# Patient Record
Sex: MALE | Race: BLACK OR AFRICAN AMERICAN | NOT HISPANIC OR LATINO | Employment: FULL TIME | URBAN - METROPOLITAN AREA
[De-identification: names, ages, dates, MRNs, and addresses within clinical notes are randomized per-mention and may not be internally consistent; named-entity substitution may affect disease eponyms.]

---

## 2017-04-12 ENCOUNTER — HOSPITAL ENCOUNTER (INPATIENT)
Facility: CLINIC | Age: 32
LOS: 2 days | Discharge: HOME | DRG: 897 | End: 2017-04-14
Attending: EMERGENCY MEDICINE | Admitting: PSYCHIATRY & NEUROLOGY
Payer: COMMERCIAL

## 2017-04-12 DIAGNOSIS — I21.4 NSTEMI (NON-ST ELEVATED MYOCARDIAL INFARCTION) (H): Primary | ICD-10-CM

## 2017-04-12 DIAGNOSIS — R45.851 SUICIDAL IDEATION: ICD-10-CM

## 2017-04-12 DIAGNOSIS — F19.10 SUBSTANCE ABUSE (H): ICD-10-CM

## 2017-04-12 DIAGNOSIS — F29 PSYCHOSIS, UNSPECIFIED PSYCHOSIS TYPE (H): ICD-10-CM

## 2017-04-12 PROBLEM — F14.90 COCAINE USE: Status: ACTIVE | Noted: 2017-04-12

## 2017-04-12 LAB
ALCOHOL BREATH TEST: 0 (ref 0–0.01)
AMPHETAMINES UR QL SCN: ABNORMAL
ANION GAP SERPL CALCULATED.3IONS-SCNC: 10 MMOL/L (ref 3–14)
BARBITURATES UR QL: ABNORMAL
BASOPHILS # BLD AUTO: 0 10E9/L (ref 0–0.2)
BASOPHILS NFR BLD AUTO: 0.3 %
BENZODIAZ UR QL: ABNORMAL
BUN SERPL-MCNC: 16 MG/DL (ref 7–30)
CALCIUM SERPL-MCNC: 8.3 MG/DL (ref 8.5–10.1)
CANNABINOIDS UR QL SCN: ABNORMAL
CHLORIDE SERPL-SCNC: 102 MMOL/L (ref 94–109)
CO2 SERPL-SCNC: 25 MMOL/L (ref 20–32)
COCAINE UR QL: ABNORMAL
CREAT SERPL-MCNC: 1.5 MG/DL (ref 0.66–1.25)
DIFFERENTIAL METHOD BLD: ABNORMAL
EOSINOPHIL # BLD AUTO: 0.1 10E9/L (ref 0–0.7)
EOSINOPHIL NFR BLD AUTO: 1.1 %
ERYTHROCYTE [DISTWIDTH] IN BLOOD BY AUTOMATED COUNT: 14.5 % (ref 10–15)
ETHANOL UR QL SCN: ABNORMAL
GFR SERPL CREATININE-BSD FRML MDRD: 54 ML/MIN/1.7M2
GLUCOSE SERPL-MCNC: 132 MG/DL (ref 70–99)
HCT VFR BLD AUTO: 39.3 % (ref 40–53)
HGB BLD-MCNC: 13.4 G/DL (ref 13.3–17.7)
IMM GRANULOCYTES # BLD: 0 10E9/L (ref 0–0.4)
IMM GRANULOCYTES NFR BLD: 0.2 %
INTERPRETATION ECG - MUSE: NORMAL
LYMPHOCYTES # BLD AUTO: 1.3 10E9/L (ref 0.8–5.3)
LYMPHOCYTES NFR BLD AUTO: 11.5 %
MCH RBC QN AUTO: 29.3 PG (ref 26.5–33)
MCHC RBC AUTO-ENTMCNC: 34.1 G/DL (ref 31.5–36.5)
MCV RBC AUTO: 86 FL (ref 78–100)
MONOCYTES # BLD AUTO: 0.8 10E9/L (ref 0–1.3)
MONOCYTES NFR BLD AUTO: 7.4 %
NEUTROPHILS # BLD AUTO: 9 10E9/L (ref 1.6–8.3)
NEUTROPHILS NFR BLD AUTO: 79.5 %
NRBC # BLD AUTO: 0 10*3/UL
NRBC BLD AUTO-RTO: 0 /100
OPIATES UR QL SCN: ABNORMAL
PLATELET # BLD AUTO: 395 10E9/L (ref 150–450)
POTASSIUM SERPL-SCNC: 3.4 MMOL/L (ref 3.4–5.3)
RBC # BLD AUTO: 4.58 10E12/L (ref 4.4–5.9)
SODIUM SERPL-SCNC: 138 MMOL/L (ref 133–144)
TROPONIN I SERPL-MCNC: 0.04 UG/L (ref 0–0.04)
TROPONIN I SERPL-MCNC: 0.05 UG/L (ref 0–0.04)
TROPONIN I SERPL-MCNC: 0.06 UG/L (ref 0–0.04)
WBC # BLD AUTO: 11.3 10E9/L (ref 4–11)

## 2017-04-12 PROCEDURE — 99285 EMERGENCY DEPT VISIT HI MDM: CPT | Performed by: EMERGENCY MEDICINE

## 2017-04-12 PROCEDURE — 25000132 ZZH RX MED GY IP 250 OP 250 PS 637: Performed by: EMERGENCY MEDICINE

## 2017-04-12 PROCEDURE — 80307 DRUG TEST PRSMV CHEM ANLYZR: CPT | Performed by: EMERGENCY MEDICINE

## 2017-04-12 PROCEDURE — 93005 ELECTROCARDIOGRAM TRACING: CPT | Performed by: EMERGENCY MEDICINE

## 2017-04-12 PROCEDURE — 85025 COMPLETE CBC W/AUTO DIFF WBC: CPT | Performed by: EMERGENCY MEDICINE

## 2017-04-12 PROCEDURE — 84484 ASSAY OF TROPONIN QUANT: CPT | Performed by: EMERGENCY MEDICINE

## 2017-04-12 PROCEDURE — 25000132 ZZH RX MED GY IP 250 OP 250 PS 637

## 2017-04-12 PROCEDURE — 12400001 ZZH R&B MH UMMC

## 2017-04-12 PROCEDURE — 36415 COLL VENOUS BLD VENIPUNCTURE: CPT | Performed by: EMERGENCY MEDICINE

## 2017-04-12 PROCEDURE — 99285 EMERGENCY DEPT VISIT HI MDM: CPT | Mod: 25 | Performed by: EMERGENCY MEDICINE

## 2017-04-12 PROCEDURE — 80320 DRUG SCREEN QUANTALCOHOLS: CPT | Performed by: EMERGENCY MEDICINE

## 2017-04-12 PROCEDURE — 93010 ELECTROCARDIOGRAM REPORT: CPT | Mod: Z6 | Performed by: EMERGENCY MEDICINE

## 2017-04-12 PROCEDURE — 80048 BASIC METABOLIC PNL TOTAL CA: CPT | Performed by: EMERGENCY MEDICINE

## 2017-04-12 PROCEDURE — 25000132 ZZH RX MED GY IP 250 OP 250 PS 637: Performed by: STUDENT IN AN ORGANIZED HEALTH CARE EDUCATION/TRAINING PROGRAM

## 2017-04-12 PROCEDURE — HZ2ZZZZ DETOXIFICATION SERVICES FOR SUBSTANCE ABUSE TREATMENT: ICD-10-PCS | Performed by: PSYCHIATRY & NEUROLOGY

## 2017-04-12 RX ORDER — ALBUTEROL SULFATE 90 UG/1
2 AEROSOL, METERED RESPIRATORY (INHALATION) EVERY 4 HOURS PRN
Status: DISCONTINUED | OUTPATIENT
Start: 2017-04-12 | End: 2017-04-14 | Stop reason: HOSPADM

## 2017-04-12 RX ORDER — FOLIC ACID 1 MG/1
1 TABLET ORAL DAILY
Status: DISCONTINUED | OUTPATIENT
Start: 2017-04-12 | End: 2017-04-14 | Stop reason: HOSPADM

## 2017-04-12 RX ORDER — OLANZAPINE 10 MG/1
10 TABLET ORAL EVERY 6 HOURS PRN
Status: DISCONTINUED | OUTPATIENT
Start: 2017-04-12 | End: 2017-04-14 | Stop reason: HOSPADM

## 2017-04-12 RX ORDER — ASPIRIN 81 MG/1
TABLET ORAL
Status: DISCONTINUED
Start: 2017-04-12 | End: 2017-04-12 | Stop reason: HOSPADM

## 2017-04-12 RX ORDER — OLANZAPINE 10 MG/2ML
10 INJECTION, POWDER, FOR SOLUTION INTRAMUSCULAR DAILY PRN
Status: DISCONTINUED | OUTPATIENT
Start: 2017-04-12 | End: 2017-04-14 | Stop reason: HOSPADM

## 2017-04-12 RX ORDER — LABETALOL 200 MG/1
200 TABLET, FILM COATED ORAL 2 TIMES DAILY
Status: ON HOLD | COMMUNITY
Start: 2016-12-14 | End: 2017-05-04

## 2017-04-12 RX ORDER — ALBUTEROL SULFATE 90 UG/1
2 AEROSOL, METERED RESPIRATORY (INHALATION) EVERY 4 HOURS PRN
Status: ON HOLD | COMMUNITY
End: 2017-05-04

## 2017-04-12 RX ORDER — RISPERIDONE 1 MG/1
1 TABLET ORAL AT BEDTIME
Status: DISCONTINUED | OUTPATIENT
Start: 2017-04-12 | End: 2017-04-14 | Stop reason: HOSPADM

## 2017-04-12 RX ORDER — MULTIPLE VITAMINS W/ MINERALS TAB 9MG-400MCG
1 TAB ORAL DAILY
Status: DISCONTINUED | OUTPATIENT
Start: 2017-04-12 | End: 2017-04-12 | Stop reason: CLARIF

## 2017-04-12 RX ORDER — LANOLIN ALCOHOL/MO/W.PET/CERES
100 CREAM (GRAM) TOPICAL DAILY
Status: COMPLETED | OUTPATIENT
Start: 2017-04-12 | End: 2017-04-14

## 2017-04-12 RX ORDER — MULTIPLE VITAMINS W/ MINERALS TAB 9MG-400MCG
1 TAB ORAL DAILY
Status: DISCONTINUED | OUTPATIENT
Start: 2017-04-12 | End: 2017-04-14 | Stop reason: HOSPADM

## 2017-04-12 RX ORDER — AMLODIPINE BESYLATE 2.5 MG/1
10 TABLET ORAL DAILY
Status: DISCONTINUED | OUTPATIENT
Start: 2017-04-12 | End: 2017-04-13

## 2017-04-12 RX ORDER — LORAZEPAM 1 MG/1
1-4 TABLET ORAL EVERY 30 MIN PRN
Status: DISCONTINUED | OUTPATIENT
Start: 2017-04-12 | End: 2017-04-14 | Stop reason: HOSPADM

## 2017-04-12 RX ORDER — ASPIRIN 81 MG/1
81 TABLET ORAL DAILY
Status: DISCONTINUED | OUTPATIENT
Start: 2017-04-12 | End: 2017-04-14 | Stop reason: HOSPADM

## 2017-04-12 RX ORDER — EPINEPHRINE 1 MG/ML
0.3 INJECTION INTRAMUSCULAR; INTRAVENOUS; SUBCUTANEOUS
Status: DISCONTINUED | OUTPATIENT
Start: 2017-04-12 | End: 2017-04-14 | Stop reason: HOSPADM

## 2017-04-12 RX ORDER — ASPIRIN 81 MG/1
324 TABLET, CHEWABLE ORAL ONCE
Status: COMPLETED | OUTPATIENT
Start: 2017-04-12 | End: 2017-04-12

## 2017-04-12 RX ORDER — FOLIC ACID 1 MG/1
1 TABLET ORAL DAILY
Status: DISCONTINUED | OUTPATIENT
Start: 2017-04-12 | End: 2017-04-12 | Stop reason: CLARIF

## 2017-04-12 RX ORDER — LISINOPRIL AND HYDROCHLOROTHIAZIDE 20; 25 MG/1; MG/1
30 TABLET ORAL DAILY
Status: ON HOLD | COMMUNITY
End: 2017-04-14

## 2017-04-12 RX ORDER — RISPERIDONE 1 MG/1
1 TABLET ORAL ONCE
Status: COMPLETED | OUTPATIENT
Start: 2017-04-12 | End: 2017-04-12

## 2017-04-12 RX ORDER — AMLODIPINE BESYLATE 10 MG/1
10 TABLET ORAL ONCE
Status: COMPLETED | OUTPATIENT
Start: 2017-04-12 | End: 2017-04-12

## 2017-04-12 RX ORDER — HYDROXYZINE HYDROCHLORIDE 25 MG/1
25-50 TABLET, FILM COATED ORAL EVERY 4 HOURS PRN
Status: DISCONTINUED | OUTPATIENT
Start: 2017-04-12 | End: 2017-04-14 | Stop reason: HOSPADM

## 2017-04-12 RX ORDER — LABETALOL 200 MG/1
200 TABLET, FILM COATED ORAL 2 TIMES DAILY
Status: DISCONTINUED | OUTPATIENT
Start: 2017-04-12 | End: 2017-04-14 | Stop reason: HOSPADM

## 2017-04-12 RX ORDER — OLANZAPINE 5 MG/1
10 TABLET, ORALLY DISINTEGRATING ORAL ONCE
Status: COMPLETED | OUTPATIENT
Start: 2017-04-12 | End: 2017-04-12

## 2017-04-12 RX ORDER — RISPERIDONE 1 MG/1
1 TABLET, ORALLY DISINTEGRATING ORAL ONCE
Status: DISCONTINUED | OUTPATIENT
Start: 2017-04-12 | End: 2017-04-12 | Stop reason: RX

## 2017-04-12 RX ORDER — LISINOPRIL 30 MG/1
30 TABLET ORAL
Status: ON HOLD | COMMUNITY
Start: 2016-12-14 | End: 2017-04-14

## 2017-04-12 RX ORDER — RISPERIDONE 0.25 MG/1
1 TABLET ORAL AT BEDTIME
COMMUNITY
End: 2017-04-17

## 2017-04-12 RX ORDER — TRAZODONE HYDROCHLORIDE 100 MG/1
100 TABLET ORAL
Status: ON HOLD | COMMUNITY
Start: 2016-12-14 | End: 2017-04-14

## 2017-04-12 RX ADMIN — Medication 100 MG: at 18:41

## 2017-04-12 RX ADMIN — LISINOPRIL 30 MG: 20 TABLET ORAL at 04:01

## 2017-04-12 RX ADMIN — AMLODIPINE BESYLATE 10 MG: 2.5 TABLET ORAL at 18:39

## 2017-04-12 RX ADMIN — OLANZAPINE 10 MG: 5 TABLET, ORALLY DISINTEGRATING ORAL at 04:01

## 2017-04-12 RX ADMIN — RANITIDINE HYDROCHLORIDE 150 MG: 150 TABLET, FILM COATED ORAL at 22:11

## 2017-04-12 RX ADMIN — ASPIRIN 324 MG: 81 TABLET, COATED ORAL at 04:03

## 2017-04-12 RX ADMIN — MULTIPLE VITAMINS W/ MINERALS TAB 1 TABLET: TAB at 18:41

## 2017-04-12 RX ADMIN — RISPERIDONE 1 MG: 1 TABLET ORAL at 04:01

## 2017-04-12 RX ADMIN — AMLODIPINE BESYLATE 10 MG: 10 TABLET ORAL at 04:01

## 2017-04-12 RX ADMIN — ASPIRIN 81 MG: 81 TABLET, COATED ORAL at 18:40

## 2017-04-12 RX ADMIN — RISPERIDONE 1 MG: 1 TABLET ORAL at 22:11

## 2017-04-12 RX ADMIN — LABETALOL HCL 200 MG: 200 TABLET, FILM COATED ORAL at 22:11

## 2017-04-12 RX ADMIN — VITAMIN D, TAB 1000IU (100/BT) 2000 UNITS: 25 TAB at 18:43

## 2017-04-12 RX ADMIN — FOLIC ACID 1 MG: 1 TABLET ORAL at 18:41

## 2017-04-12 ASSESSMENT — ENCOUNTER SYMPTOMS
NERVOUS/ANXIOUS: 1
HALLUCINATIONS: 1
AGITATION: 1
HYPERACTIVE: 1
SHORTNESS OF BREATH: 1
SLEEP DISTURBANCE: 1

## 2017-04-12 ASSESSMENT — ACTIVITIES OF DAILY LIVING (ADL)
ORAL_HYGIENE: INDEPENDENT
GROOMING: INDEPENDENT
DRESS: INDEPENDENT

## 2017-04-12 NOTE — ED NOTES
"Pt presents to ED with suicidal ideation s/t drug relapse. Pt states he had been sober for approx 6 months and has now been on a 4 day miranda of cocaine and meth. Pt also states he drinks a pint of vodka a day. Pt states he gets these thoughts everything he does a lot of drugs. Pt denies having a plan. Pt states \"everything is picture perfect its just the drugs do this to me\". Security watch intiated.   "

## 2017-04-12 NOTE — IP AVS SNAPSHOT
[place] [id number]    [place] [address]    Phone:  [phone]                                       After Visit Summary   4/12/2017    Bryon Alonzo    MRN: [id number]           After Visit Summary Signature Page     I have received my discharge instructions, and my questions have been answered. I have discussed any challenges I see with this plan with the nurse or doctor.    ..........................................................................................................................................  Patient/Patient Representative Signature      ..........................................................................................................................................  Patient Representative Print Name and Relationship to Patient    ..................................................               ................................................  Date                                            Time    ..........................................................................................................................................  Reviewed by Signature/Title    ...................................................              ..............................................  Date                                                            Time

## 2017-04-12 NOTE — IP AVS SNAPSHOT
MRN:[id number]                      After Visit Summary   4/12/2017    Bryon Alonzo    MRN: [id number]           Thank you!     Thank you for choosing [facility] for your care. Our goal is always to provide you with excellent care.        Patient Information     Date Of Birth          1985        Designated Caregiver       Most Recent Value    Caregiver    Will someone help with your care after discharge? no      About your hospital stay     You were admitted on:  April 12, 2017 You last received care in the:   [facility]    You were discharged on:  April 14, 2017       Who to Call     For medical emergencies, please call 911.  For non-urgent questions about your medical care, please call your primary care provider or clinic, [phone]          Attending Provider     Provider Specialty    Brandon Kumar MD Emergency Medicine    Roel Ortega MD Emergency Medicine    Fernando Crawford MD Psychiatry       Primary Care Provider Office Phone # Fax #    [facility] [phone]       [facility] [place] [address]        Further instructions from your care team       Behavioral Discharge Planning and Instructions      Summary:  You were admitted on 4/12/2017  For Bipolar D/O and Suicidal Ideations.  You were treated by Dr. Fernando Crawford MD and discharged on 4/14/2017 from [facility]. You have began to stabilize after restarting your medication and being cocaine free. Upon discharge you are to complete a Rule 25 assessment to secure placement in a treatment facility to help with your continued sobriety and stabilization.     Main Diagnosis: Bipolar Disorder    Health Care Follow-up Appointments:     Dr. Oshea- Monday April 17th 10:30am   [facility] [address], [address] [place] [address].   Phone: [phone] Fax: [phone]    Attend all scheduled appointments with your outpatient  providers. Call at least 24 hours in advance if you need to reschedule an appointment to ensure continued access to your outpatient providers.   Major Treatments, Procedures and Findings:  You were provided with: a psychiatric assessment, assessed for medical stability, medication evaluation and/or management and milieu management    Symptoms to Report: increased confusion, losing more sleep, mood getting worse or thoughts of suicide    Early warning signs can include: increased depression or anxiety sleep disturbances increased thoughts or behaviors of suicide or self-harm  increased unusual thinking, such as paranoia or hearing voices    Safety and Wellness:  Take all medicines as directed.  Make no changes unless your doctor suggests them.      Follow treatment recommendations.  Refrain from alcohol and non-prescribed drugs.  If there is a concern for safety, call 911.    Resources:   Crisis Intervention: 890.485.3119 or 430-891-2996 (TTY: 220.944.5170).  Call anytime for help.  National [place] on Mental Illness (www.[place].nba.org): 145.208.1447 or 000-910-8913.  Alcoholics Anonymous (www.alcoholics-anonymous.org): Check your phone book for your local chapter.  Suicide Awareness Voices of Education (SAVE) (www.save.org): 132-463-VWKX (7937)  National Suicide Prevention Line (www.mentalhealthmn.org): 159-927-CDWC (1343)  Mental Health Consumer/Survivor Network of [place] (www.mhcsn.net): 771.367.1361 or 184-269-7690  Self- Management and Recovery Training., SMART-- Toll free: 300.568.9018  www.[employer].OpSource  [facility] Crisis (COPE) Response - Adult [phone]  [facility] Mental Health Crisis Team - Child: [phone]    The treatment team has appreciated the opportunity to work with you.     If you have any questions or concerns our unit number is [phone]  You may be receiving a follow-up phone call within the next three days from a representative from behavioral health.   "          Pending Results     No orders found from 4/10/2017 to 4/13/2017.            Admission Information     Date & Time Provider Department Dept. Phone    4/12/2017 Fernando Crawford MD  [facility] [phone]      Your Vitals Were     Blood Pressure Pulse Temperature Respirations Height Pulse Oximetry    146/76 67 97.4  F (36.3  C) (Tympanic) 18 1.676 m (5' 6\") 97%      Care EveryWhere ID     This is your Care EveryWhere ID. This could be used by other organizations to access your [facility] medical records  [id number]           Review of your medicines      CONTINUE these medicines which may have CHANGED, or have new prescriptions. If we are uncertain of the size of tablets/capsules you have at home, strength may be listed as something that might have changed.        Dose / Directions    * albuterol 108 (90 BASE) MCG/ACT Inhaler   This may have changed:  Another medication with the same name was removed. Continue taking this medication, and follow the directions you see here.   Generic drug:  albuterol        Dose:  2 puff   Inhale 2 puffs into the lungs every 4 hours as needed   Refills:  0       * albuterol (2.5 MG/3ML) 0.083% neb solution   This may have changed:  Another medication with the same name was removed. Continue taking this medication, and follow the directions you see here.   Used for:  Mild intermittent asthma without complication        Dose:  1 vial   Take 1 vial (2.5 mg) by nebulization every 6 hours as needed for shortness of breath / dyspnea or wheezing   Quantity:  360 mL   Refills:  1       aspirin 81 MG EC tablet   This may have changed:  Another medication with the same name was removed. Continue taking this medication, and follow the directions you see here.   Used for:  NSTEMI (non-ST elevated myocardial infarction) (H)        Dose:  81 mg   Take 1 tablet (81 mg) by mouth daily   Quantity:  30 tablet   Refills:  1       labetalol 200 MG tablet   Commonly known as:  NORMODYNE   This may " have changed:  Another medication with the same name was removed. Continue taking this medication, and follow the directions you see here.        Dose:  200 mg   Take 200 mg by mouth   Refills:  0       ranitidine 150 MG tablet   Commonly known as:  ZANTAC   This may have changed:  Another medication with the same name was removed. Continue taking this medication, and follow the directions you see here.   Used for:  Gastroesophageal reflux disease without esophagitis        Dose:  150 mg   Take 1 tablet (150 mg) by mouth 2 times daily (before meals)   Quantity:  60 tablet   Refills:  1       risperiDONE 0.25 MG tablet   Commonly known as:  risperDAL   This may have changed:  Another medication with the same name was removed. Continue taking this medication, and follow the directions you see here.        Dose:  1 mg   Take 1 mg by mouth   Refills:  0       traZODone 50 MG tablet   Commonly known as:  DESYREL   This may have changed:  Another medication with the same name was removed. Continue taking this medication, and follow the directions you see here.   Used for:  Adjustment insomnia        Dose:  100 mg   Take 2 tablets (100 mg) by mouth At Bedtime   Quantity:  60 tablet   Refills:  1       * Notice:  This list has 2 medication(s) that are the same as other medications prescribed for you. Read the directions carefully, and ask your doctor or other care provider to review them with you.      CONTINUE these medicines which have NOT CHANGED        Dose / Directions    amLODIPine 10 MG tablet   Commonly known as:  NORVASC   Used for:  NSTEMI (non-ST elevated myocardial infarction) (H)        Dose:  10 mg   Take 1 tablet (10 mg) by mouth daily   Quantity:  30 tablet   Refills:  1       cholecalciferol 2000 UNITS tablet   Used for:  Polysubstance abuse        Dose:  2000 Units   Take 2,000 Units by mouth daily   Quantity:  30 tablet   Refills:  1       EPINEPHrine 0.3 MG/0.3ML injection   Used for:  Allergic reaction,  initial encounter        Dose:  0.3 mg   Inject 0.3 mLs (0.3 mg) into the muscle once as needed for anaphylaxis   Quantity:  0.6 mL   Refills:  1       folic acid 1 MG tablet   Commonly known as:  FOLVITE   Used for:  Polysubstance abuse        Dose:  1 mg   Take 1 tablet (1 mg) by mouth daily   Quantity:  30 tablet   Refills:  1       * lisinopril 2.5 MG tablet   Commonly known as:  PRINIVIL/Zestril   Used for:  NSTEMI (non-ST elevated myocardial infarction) (H)        Dose:  30 mg   Take 12 tablets (30 mg) by mouth daily   Quantity:  30 tablet   Refills:  1       * lisinopril 30 MG tablet   Commonly known as:  PRINIVIL,ZESTRIL        Dose:  30 mg   Take 30 mg by mouth   Refills:  0       multivitamin, therapeutic with minerals Tabs tablet   Used for:  Polysubstance abuse        Dose:  1 tablet   Take 1 tablet by mouth daily   Quantity:  30 each   Refills:  1       * Notice:  This list has 2 medication(s) that are the same as other medications prescribed for you. Read the directions carefully, and ask your doctor or other care provider to review them with you.      STOP taking     lisinopril-hydrochlorothiazide 20-25 MG per tablet   Commonly known as:  PRINZIDE/ZESTORETIC           loratadine 10 MG tablet   Commonly known as:  CLARITIN                    Protect others around you: Learn how to safely use, store and throw away your medicines at www.disposemymeds.org.             Medication List: This is a list of all your medications and when to take them. Check marks below indicate your daily home schedule. Keep this list as a reference.      Medications           Morning Afternoon Evening Bedtime As Needed    * albuterol 108 (90 BASE) MCG/ACT Inhaler   Inhale 2 puffs into the lungs every 4 hours as needed   Last time this was given:  2 puffs on 4/13/2017 10:32 PM   Generic drug:  albuterol                                * albuterol (2.5 MG/3ML) 0.083% neb solution   Take 1 vial (2.5 mg) by nebulization every 6  hours as needed for shortness of breath / dyspnea or wheezing                                amLODIPine 10 MG tablet   Commonly known as:  NORVASC   Take 1 tablet (10 mg) by mouth daily   Last time this was given:  10 mg on 4/13/2017 10:08 AM                                aspirin 81 MG EC tablet   Take 1 tablet (81 mg) by mouth daily   Last time this was given:  81 mg on 4/14/2017  8:56 AM                                cholecalciferol 2000 UNITS tablet   Take 2,000 Units by mouth daily   Last time this was given:  2,000 Units on 4/14/2017  8:57 AM                                EPINEPHrine 0.3 MG/0.3ML injection   Inject 0.3 mLs (0.3 mg) into the muscle once as needed for anaphylaxis                                folic acid 1 MG tablet   Commonly known as:  FOLVITE   Take 1 tablet (1 mg) by mouth daily   Last time this was given:  1 mg on 4/14/2017  8:57 AM                                labetalol 200 MG tablet   Commonly known as:  NORMODYNE   Take 200 mg by mouth   Last time this was given:  200 mg on 4/14/2017  8:57 AM                                * lisinopril 2.5 MG tablet   Commonly known as:  PRINIVIL/Zestril   Take 12 tablets (30 mg) by mouth daily   Last time this was given:  30 mg on 4/14/2017  8:57 AM                                * lisinopril 30 MG tablet   Commonly known as:  PRINIVIL,ZESTRIL   Take 30 mg by mouth   Last time this was given:  30 mg on 4/14/2017  8:57 AM                                multivitamin, therapeutic with minerals Tabs tablet   Take 1 tablet by mouth daily   Last time this was given:  1 tablet on 4/14/2017  8:57 AM                                ranitidine 150 MG tablet   Commonly known as:  ZANTAC   Take 1 tablet (150 mg) by mouth 2 times daily (before meals)   Last time this was given:  150 mg on 4/14/2017  8:57 AM                                risperiDONE 0.25 MG tablet   Commonly known as:  risperDAL   Take 1 mg by mouth   Last time this was given:  1 mg on 4/13/2017  10:32 PM                                traZODone 50 MG tablet   Commonly known as:  DESYREL   Take 2 tablets (100 mg) by mouth At Bedtime   Last time this was given:  25 mg on 4/13/2017  7:44 PM                                * Notice:  This list has 4 medication(s) that are the same as other medications prescribed for you. Read the directions carefully, and ask your doctor or other care provider to review them with you.

## 2017-04-12 NOTE — H&P
"    -----------------------------------------------------------------------------------------------------------  Psychiatry History & Physical      Bryon Alonzo MRN# [id number]   Age: 32 year old YOB: 1985     Date of Admission: 4/12/2017     Interviewed at 1:48 PM at [address] [facility]            Contacts:   The following information obtained from the note in last hospitalization on 7/26/16:   \"Primary Outpatient Psychiatrist: Dr. Oshea [facility]- [address] [place] [address], [address] [place] [address]. Phone: [phone] Fax: [phone]  Therapist: none  Primary Physician: Clinic, [facility] Dr. Fernanda Calhoun\"  [facility] CM: [facility] in [place] or [facility] in [place], previously [place] [facility]         Chief Concern:   \"I'm sleepy.\"    History is obtained from the patient and EMR.         History of Present Illness:   Bryon Alonzo is a 32 year old male with a significant past psychiatric history of  , bipolar disorder, polysubstance use disorder (Cocaine/Meth), who presented to [facility] ER with due to suicidality after relapsing on cocaine. At unit patient is sedated and is struggling to response. He is arousable, answers to questions in words, seems to be oriented and immediately falls sleep. Most of the information is obtained through chart review.     Per ED provider note at the day of admission: \"Bryon Alonzo is a 32 year old male with history of bipolar and polysubstance abuse who presents to ER for suicidal thoughts and substance abuse. Patient reports that he was sober for 6 months however relapsed about 4 days ago using cocaine and crack cocaine daily. He last used about 9 PM this evening. He reports feeling very anxious and out of control along with hearing voices, but have told them he is \"worthless\". Patient has also been off his regular blood pressure and antipsychotic medications for the last 4 days due to the drug use. He reports with the drug use he has been " "waxing and waning with thoughts about killing himself since he cannot stay sober. He states he gets worse when he comes off the cocaine and does not have a specific plan but expressed a desire to die. Patient reports that he feels very anxious and not safe at home and feels that he needs readmission to the hospital. He is concerned as he was previously on a commitment and is feeling very depressed about not being a little follow through with sobriety since coming off commitment. He expressed feelings of regret and the failure with regard to sobriety\".     His last [facility] was [facility] when he presented with the same clinical picture and was treated for substance use disorder and bipolar II DO. The petitions for commitment to [facility] was accepted on 9/12/16 for MICD. He was discharged to [facility] and also he was referred to the [facility]. He attended an orientation for day treatment on 12/26/16, but did not attended. Per notes he found a job and his schedule was interfering with the \"mixed co ocuring Day Outpatient\"program .     The patient was medically cleared and transferred to station 22.  Bryon Alonzo's goals of this hospitalization are to \"get some sleep\".          Psychiatric History:   Obtained from last hospitalization h& P note on 9/5/17:  \"- Inpatient treatment: >12 inpatient treatments, most recent [facility] in June. Has had multiple at [facility] and was committed while living in [place]  twice following a manic episode.  - Suicide attempts: This is 4th time for pt  - Self-injurious behavior: No hx  - Diagnoses: BPAD  - ECT: No hx  - Medication trials: several trials, prozac (dizziness), risperdal (sedation)-notes sedation is biggest factor to self discontinuation  - Commitment: As above, in [place] x2\"         Substance Use History:   Obtained from last hospitalization h&p note on 9/5/17:  \"- Started using at age " "10- THC and EtOH and then eventually heavy use of PCP followed by heavy use of LSD. Later started using cocaine and methamphetamine which are his current drugs of choice. No hx of IV drug use. Last binge  was prior to admission to Medicine.  - No hx of complicated withdrawal including DTs or seizures.  - Previous CD treatment: Several, most recent was [facility] in [place] in February  - Periods of abstinence: Had been sober for past 5 months until recent relapse\"         Psychiatric Review of Systems:     After receiving olanzapine in ED, patient  Has been sleepy. He is falling sleep frequently during our brief encounter, struggling to think clearly and answer the questions. MILLER is deferred to a later time when patient is wakeful.         Medical Review of Systems:   Per ED note:   \" Respiratory: Positive for shortness of breath (Shortness of breath and breathing fast tonight after using the cocaine). \"   MILLER is deferred to a later time when patient is wakeful.          Past Medical History     Past Medical History:   Diagnosis Date     Asthma      Bipolar disorder (H)      Chronic kidney disease      Hyperlipidemia      Hypertension      LVH (left ventricular hypertrophy) 2015     Polysubstance abuse     Methamphetamine, Cocaine, Alcohol, THC, LSD            Medications:   I have reviewed this patient's current medications. Patient is unable to recall the name of the medication. He only remembers the category of antihypertensives to mention. Chart review and pharmacy note was reviewed.          Allergies:     Allergies   Allergen Reactions     Banana Anaphylaxis           Family History:    Obtained from last hospitalization h& P note on 9/5/17:  \"- Substance use: Multigenerational use of alcohol and substances in the family.  - No completed suicides in relatives.\"        Social History   Obtained from last hospitalization h& P note on 9/5/17:  \"- Born in [facility] and raised in home by single mom.  - The patient is " "one of several siblings. One brother  from accident while high on ecstasy.  - Parents never .  - Financial situation: \"I'm not worried about it, because the [facility] will take care of it\".  - Social support: reports good friends who are sober are able to support him.  - Relationship: Single Has a 2 and 4 year old boys, (the kids share the same birthday) with one woman. \"I am not able to love her the way she deserves.\".  - Highest education attained: graduated HS.   - Employment: Unemployed, not seeking work.   - Legal history: \"No felonies\" On stay of Commitment. Pt requesting that this be revoked and he be committed to ensure adherence with CD treatment.  - Spiritual: \"I'm not Yarsani, but I am spiritual.\"         Labs:     Results for orders placed or performed during the hospital encounter of 17 (from the past 24 hour(s))   Drug abuse screen 6 urine (tox)   Result Value Ref Range    Amphetamine Qual Urine  NEG     Negative   Cutoff for a negative amphetamine is 500 ng/mL or less.      Barbiturates Qual Urine  NEG     Negative   Cutoff for a negative barbiturate is 200 ng/mL or less.      Benzodiazepine Qual Urine  NEG     Negative   Cutoff for a negative benzodiazepine is 200 ng/mL or less.      Cannabinoids Qual Urine (A) NEG     Positive   Cutoff for a positive cannabinoid is greater than 50 ng/mL. This is an   unconfirmed screening result to be used for medical purposes only.      Cocaine Qual Urine (A) NEG     Positive   Cutoff for a positive cocaine is greater than 300 ng/mL. This is an unconfirmed   screening result to be used for medical purposes only.      Ethanol Qual Urine  NEG     Negative   Cutoff for a negative urine ethanol is 0.05 g/dL or less      Opiates Qualitative Urine  NEG     Negative   Cutoff for a negative opiate is 300 ng/mL or less.     EKG 12 lead   Result Value Ref Range    Interpretation ECG Click View Image link to view waveform and result    Alcohol breath test POCT " "  Result Value Ref Range    Alcohol Breath Test 0.00 0.00 - 0.01   CBC with platelets differential   Result Value Ref Range    WBC 11.3 (H) 4.0 - 11.0 10e9/L    RBC Count 4.58 4.4 - 5.9 10e12/L    Hemoglobin 13.4 13.3 - 17.7 g/dL    Hematocrit 39.3 (L) 40.0 - 53.0 %    MCV 86 78 - 100 fl    MCH 29.3 26.5 - 33.0 pg    MCHC 34.1 31.5 - 36.5 g/dL    RDW 14.5 10.0 - 15.0 %    Platelet Count 395 150 - 450 10e9/L    Diff Method Automated Method     % Neutrophils 79.5 %    % Lymphocytes 11.5 %    % Monocytes 7.4 %    % Eosinophils 1.1 %    % Basophils 0.3 %    % Immature Granulocytes 0.2 %    Nucleated RBCs 0 0 /100    Absolute Neutrophil 9.0 (H) 1.6 - 8.3 10e9/L    Absolute Lymphocytes 1.3 0.8 - 5.3 10e9/L    Absolute Monocytes 0.8 0.0 - 1.3 10e9/L    Absolute Eosinophils 0.1 0.0 - 0.7 10e9/L    Absolute Basophils 0.0 0.0 - 0.2 10e9/L    Abs Immature Granulocytes 0.0 0 - 0.4 10e9/L    Absolute Nucleated RBC 0.0    Basic metabolic panel   Result Value Ref Range    Sodium 138 133 - 144 mmol/L    Potassium 3.4 3.4 - 5.3 mmol/L    Chloride 102 94 - 109 mmol/L    Carbon Dioxide 25 20 - 32 mmol/L    Anion Gap 10 3 - 14 mmol/L    Glucose 132 (H) 70 - 99 mg/dL    Urea Nitrogen 16 7 - 30 mg/dL    Creatinine 1.50 (H) 0.66 - 1.25 mg/dL    GFR Estimate 54 (L) >60 mL/min/1.7m2    GFR Estimate If Black 65 >60 mL/min/1.7m2    Calcium 8.3 (L) 8.5 - 10.1 mg/dL   Troponin I (now + 6 & 12 hrs)   Result Value Ref Range    Troponin I ES 0.057 (H) 0.000 - 0.045 ug/L   Troponin I   Result Value Ref Range    Troponin I ES 0.052 (H) 0.000 - 0.045 ug/L            Psychiatric Examination:   /82  Pulse 99  Temp 98.5  F (36.9  C) (Oral)  Resp 23  Ht 1.676 m (5' 6\")  SpO2 97%    Appearance:  young male, recorded age matches to the appearance, has a pair of bright earings, falling sleep while speaking  Attitude:  somewhat cooperative  Eye Contact:  eyes close mostly  Mood:  \"fine\"  Affect:  mood incongruent  Speech:  " mumbling  Psychomotor Behavior:  no evidence of tardive dyskinesia, dystonia, or tics  Thought Process:  linear  Associations:  no loose associations  Thought Content:  Unable to assess at  this time  Insight:  Unable to assess at  this time  Judgment:  Unable to assess at  this time  Oriented to:  Unable to assess at  this time  Attention Span and Concentration:  Unable to assess at  this time  Recent and Remote Memory:  Unable to assess at  this time  Language: communicates coherently in conversational context  Fund of Knowledge: Unable to assess at  this time  Muscle Strength and Tone: normal  Gait and Station: Unable to assess at  this time         Physical Examination:   Please refer to note by, , dated 4/12 for details of physical exam.         Assessment:   Bryon Alonzo is a 32 year old male with a history of poly substance use disorder (DOC: Cocaine) and bipolar disorder who presented to the ED due to suicidality, hallucinations and anxiety in the context of relapsing on cocaine use. Patient also reports significant alcohol use in the past week.  The patient's last psychiatric hospitalization was in [address] on 9/5/2016 when he came with similar clinical presentation, stabilized in the unit, medication restarted and discharged with recommendations for treatment compliance and abstinence from drugs and alcohol to [facility] facility.  I am uncertain if the patient is currently being seen by any provider and need to investigate this further when he is more wakeful. Family history is notable for substance use. Current psychosocial stressors are not well known other than the fact that he has a long standing psychiatric  History including bipolar and substance use. The patient admits to using cocaine and alcohol and was suicidal upon admision.      At ED he reports feeling very anxious and out of control along with hearing voices. The MSE is notable for a sedated, cooperative patient who   Just received olanzapine due to severity of his symptoms . His Utox was positive for cannabinoids and cocaine. Patient's current presentation is consistent with historic diagnosis of cannabinoids/ cocaine intoxication with perceptual disturbance because of 1) reported recent use of cocaine 2) sever anxiety and hypervigilance , and 3) SOB and psychomotor agitation, which is not best describable by another medical or mental health diagnosis. By reviewing the notes, I could not discern if ortega prompted him to use or vice versa. I recommend evaluating his behavior preceding his cocaine use to investigate the possibility of exacerbation of bipolar disorder. Per notes, he has been off his meds for the past 4 days. It was not clear to me why a patient with diagnosis of bipolar II is not on any mood stabilizer and instead is receiving Risperdal only. PTA medications are not confirmed by patient and they were restarted based on the information in the chart and the pharmacist. Given that he is actively suicidal, patient warrants inpatient psychiatric hospitalization to maintain his safety. Disposition pending clinical stabilization, medication optimization and development of an appropriate discharge plan. Suicide precaution and withdrawal precaution started for the patient. Considering his history of ETOH use disorder and the reported heavy alcohol use, MSSA with lorazepam on board as well as folic acid, multi vitamin and thiamin initiated for patient. Due to anaphylaxis to Banana, Epi pen provided.       Plan   Admit to station 22 under the care of Dr. Crawford. To be staffed by Dr. Crawford in the AM.    Principal Diagnosis:   # Cocaine/cannabinoids intoxication:  # Bipolar disorder    Medications:   New:   Zyprexa PO/IM for psychiatric emergencies  Hydroxyzine 25-50 mg PRN for anxiety    Continue/restart:  - Trazodone PRN for sleep  - Risperidone 1 mg QHS  - Folic Acid  - Multivitamin  - Thiamin    Other treatments:  - MSSA  Protocol with lorazepam    * Med list to be confirmed by patient.     Laboratory/Imaging: Admission labs ordered    Consults:   None    Patient will be treated in therapeutic milieu with appropriate individual and group therapies as described.    Secondary psychiatric diagnoses of concern this admission:     - Bipolar disorder type II, depressed state    Medical diagnoses to be addressed this admission:    Medications:     # h/o non-ischemic cardiomyopathy: Patient presented with SOB. Troponin I elevated , not changed from base line. EKG with non specific changes , not suggestive of STEMI.   - ASA tab 81 mg daily  - Recheck troponin I per ED provider note    # HTN/CKD:   - Amlodipine tab 10 mg  - Labetalol tab 200 mg BID  - lisinopril tab 30 mg daily    # asthma  - Albuterol inhaler     #GERD:  - Cont zantac     #hyperlipidemia: results suggestive of hyperlipidemia. Patient was once on Atorvastatin. Unclear why it was stopped.   - Lipid profile    Consults:  - IM conuslt placed, appreciate their recomms    Relevant psychosocial stressors: Not known    Legal Status: Voluntary    Safety Assessment:   Checks: Status 15  Precautions: Suicide  Substance Withdrawal  Pt has not required locked seclusion or restraints in the past 24 hours to maintain safety, please refer to RN documentation for further details.    The risks, benefits, alternatives and side effects have been discussed and are understood by the patient and other caregivers.     Anticipated Disposition/Discharge Date: Unknown at this time. Pending further clinical evaluation and stabilization.    Attestation:  Patient has been seen and evaluated by me,  Melissa Jimenez MD Psychiatry Resident, PGY-1.    Attending Admission Attestation Note:    I personally interviewed and examined Bryon on April 13, 2017, I reviewed the admission history/examination and other documents related to the admission.  I confirmed the findings in the admission note and I  agree with the diagnosis and treatment plan with the following corrections, clarifications, additional findings, and assessments: I certify that the treatment plan was reviewed and approved or developed by me in accordance with standard psychiatric practice. I certifiy that the inpatient services were ordered in accordance with the Medicare regulations governing the order. This includes certification that hospital inpatient services are reasonable and necessary and in the case of services not specified as inpatient-only under 42 .22(n), that they are appropriately provided as inpatient services in accordance with the 2-midnight benchmark under 42 .3(e).     The reason for inpatient status is Acute Psychosis, Suicidal Ideation and/or Behavior and Substance Dependence. High degree of complexity due to suicidality, delirium, psychosis, in association with cocaine use disorder.    Fernando Crawford M.D.  of Psychiatry  Pager: [phone]    email: [email or web address]

## 2017-04-12 NOTE — PROGRESS NOTES
04/12/17 1200   Patient Belongings   Did you bring any home meds/supplements to the hospital?  No   Patient Belongings keys;clothing;wallet;shoes   Disposition of Belongings All items locked in patient wall locker   Belongings Search Yes   Clothing Search Yes   Second Staff Sorin PINEDO   General Info Comment 50 dollars cash sent to [facility] with two visa cards.    ADMISSION:  I am responsible for any personal items that are not sent to the [facility] or pharmacy. [facility] is not responsible for loss, theft or damage of any property in my possession.    Patient Signature _____________________ Date/Time _____________________    Staff Signature _______________________ Date/Time _____________________    2nd Staff person, if patient is unable/unwilling to sign  ___________________________________ Date/Time _____________________  DISCHARGE:  All personal items have been returned to me.    Patient Signature _____________________ Date/Time _____________________    Staff Signature _______________________ Date/Time _____________________

## 2017-04-12 NOTE — ED PROVIDER NOTES
"  History     Chief Complaint   Patient presents with     Suicidal     Addiction Problem     HPI  Bryon Alonzo is a 32 year old male with history of bipolar and polysubstance abuse who presents to ER for suicidal thoughts and substance abuse.  Patient reports that he was sober for 6 months however relapsed about 4 days ago using cocaine and crack cocaine daily.  He last used about 9 PM this evening.  He reports feeling very anxious and out of control along with hearing voices, but have told them he is \"worthless\".  Patient has also been off his regular blood pressure and antipsychotic medications for the last 4 days due to the drug use.  He reports with the drug use he has been waxing and waning with thoughts about killing himself since he cannot stay sober.  He states he gets worse when he comes off the cocaine and does not have a specific plan but expressed a desire to die.  Patient reports that he feels very anxious and not safe at home and feels that he needs readmission to the hospital.  He is concerned as he was previously on a commitment and is feeling very depressed about not being a little follow through with sobriety since coming off commitment.  He expressed feelings of regret and the failure with regard to sobriety.    I have reviewed the Medications, Allergies, Past Medical and Surgical History, and Social History in the [facility] system.  Past Medical History:   Diagnosis Date     Asthma      Bipolar disorder (H)      Chronic kidney disease      Hyperlipidemia      Hypertension      LVH (left ventricular hypertrophy) 2015     Polysubstance abuse     Methamphetamine, Cocaine, Alcohol, THC, LSD       Past Surgical History:   Procedure Laterality Date     NO HISTORY OF SURGERY         Family History   Problem Relation Age of Onset     DIABETES Mother      Hypertension Mother      Coronary Artery Disease Father      KIDNEY DISEASE Father        Social History   Substance Use Topics     Smoking status: " "Current Some Day Smoker     Smokeless tobacco: Not on file     Alcohol use 0.0 oz/week     0 Standard drinks or equivalent per week      Comment: One pint per day of vodka per day for past five days        Allergies   Allergen Reactions     Banana Anaphylaxis     Current Facility-Administered Medications   Medication     aspirin EC 81 MG EC tablet     Current Outpatient Prescriptions   Medication     LABETALOL HCL PO     lisinopril (PRINIVIL,ZESTRIL) 2.5 MG tablet     amLODIPine (NORVASC) 10 MG tablet     traZODone (DESYREL) 50 MG tablet     folic acid (FOLVITE) 1 MG tablet     multivitamin, therapeutic with minerals (THERA-VIT-M) TABS     cholecalciferol 2000 UNITS tablet     aspirin 81 MG EC tablet     albuterol (2.5 MG/3ML) 0.083% nebulizer solution     albuterol (PROAIR HFA, PROVENTIL HFA, VENTOLIN HFA) 108 (90 BASE) MCG/ACT inhaler     loratadine (CLARITIN) 10 MG tablet     risperiDONE (RISPERDAL) 1 MG tablet     ranitidine (ZANTAC) 150 MG tablet     EPINEPHrine 0.3 MG/0.3ML injection 2-pack     Review of Systems   Respiratory: Positive for shortness of breath (Shortness of breath and breathing fast tonight after using the cocaine).    Psychiatric/Behavioral: Positive for agitation, hallucinations, sleep disturbance and suicidal ideas. Negative for self-injury. The patient is nervous/anxious and is hyperactive.    All other systems reviewed and are negative.      Physical Exam   BP: (!) 186/111  Pulse: 99  Heart Rate: 104  Temp: 98.5  F (36.9  C)  Resp: 16  Height: 167.6 cm (5' 6\")  SpO2: 99 %  Physical Exam   Constitutional: He appears well-developed and well-nourished. He appears distressed (Secondary to ortega and anxiety).   HENT:   Head: Normocephalic and atraumatic.   Eyes: Conjunctivae and EOM are normal. Pupils are equal, round, and reactive to light.   Neck: Normal range of motion.   Cardiovascular: Regular rhythm and intact distal pulses.  Tachycardia present.    Pulmonary/Chest: Effort normal and " breath sounds normal. No respiratory distress. He has no wheezes. He has no rales.   Abdominal: Soft. He exhibits no distension. There is no tenderness. There is no guarding.   Musculoskeletal: Normal range of motion. He exhibits no edema.   Neurological: He is alert. Coordination normal.   Skin: Skin is warm and dry. He is not diaphoretic. No pallor.   Psychiatric: His mood appears anxious. His speech is rapid and/or pressured and tangential. He is agitated, hyperactive and actively hallucinating. He is not aggressive and not combative. Thought content is not paranoid. He expresses impulsivity. He exhibits a depressed mood. He expresses suicidal ideation. He expresses no homicidal ideation. He expresses no suicidal plans.   Nursing note and vitals reviewed.    ED Course     ED Course     Procedures                EKG Interpretation:      Interpreted by Brandon Kumar  Time reviewed:0408   Symptoms at time of EKG: shortness of breath   Rhythm: normal sinus   Rate: normal  Axis: NORMAL  Ectopy: none  Conduction: normal  ST Segments/ T Waves: No ST-T wave changes  Q Waves: none  Comparison to prior: Unchanged from September 26, 2016.    Clinical Impression: normal EKG    Critical Care time:  none        Labs Ordered and Resulted from Time of ED Arrival Up to the Time of Departure from the ED   DRUG ABUSE SCREEN 6 CHEM DEP URINE ([facility]) - Abnormal; Notable for the following:        Result Value    Cannabinoids Qual Urine   (*)     Value: Positive   Cutoff for a positive cannabinoid is greater than 50 ng/mL. This is an   unconfirmed screening result to be used for medical purposes only.      Cocaine Qual Urine   (*)     Value: Positive   Cutoff for a positive cocaine is greater than 300 ng/mL. This is an unconfirmed   screening result to be used for medical purposes only.      All other components within normal limits   CBC WITH PLATELETS DIFFERENTIAL - Abnormal; Notable for the following:     WBC 11.3 (*)      Hematocrit 39.3 (*)     Absolute Neutrophil 9.0 (*)     All other components within normal limits   BASIC METABOLIC PANEL - Abnormal; Notable for the following:     Glucose 132 (*)     Creatinine 1.50 (*)     GFR Estimate 54 (*)     Calcium 8.3 (*)     All other components within normal limits   TROPONIN I - Abnormal; Notable for the following:     Troponin I ES 0.057 (*)     All other components within normal limits   ALCOHOL BREATH TEST POCT - Normal   CARDIAC CONTINUOUS MONITORING       Assessments & Plan (with Medical Decision Making)   I was physically present and have reviewed and verified the accuracy of this note documented by (myself).     Disclaimer: This note consists of symbols derived from keyboarding, dictation, and/or voice recognition software. As a result, there may be errors in the script that have gone undetected.  Please consider this when interpreting information found in the chart.These sections of the chart were reviewed for accuracy to the best of my knowledge and ability.    Patient was clinically assessed and consented to treatment. After assessment, medical decision making and workup were discussed with the patient. The patient was agreeable to plan for testing, workup, and treatment.  Bryon Alonzo is a 32 year old male who presents today for suicidal ideation, hallucinations and anxiety.  Patient also reporting some shortness breath and he has had elevated troponin with his history of cocaine use.  EKG was checked which was unremarkable and showed no acute changes.  Labs were checked which showed a slight leukocytosis 11.3 and no significant changes from his baseline metabolic panel.  Creatinine have been elevated for her was 1.5.  Troponin was 0.057 which is at baseline for the patient.  Patient did receive his oral blood pressure and antipsychotic medications along with a dose of Zyprexa as he continued to need to have racing thoughts, pressured speech, and anxiety despite his  Risperdal.  Patient was sleeping after the Zyprexa and had no further shortness of breath.  His vital signs did improve with the blood pressure medication and calming him down.  Patient hallucinating with manic type behavior but not acutely aggressive or violently psychotic at this time.  Patient was voluntary and plan for mental health admission.  Patient will receive repeat troponin at 8:45 AM and continue with plan for admission to mental health.  Report was given to behavioral intake and patient will be plan for admission to mental health when a bed is available.    I have reviewed the nursing notes.    I have reviewed the findings, diagnosis, plan and need for follow up with the patient.    New Prescriptions    No medications on file       Final diagnoses:   Suicidal ideation   Substance abuse   Psychosis, unspecified psychosis type       4/12/2017   [facility], [place], EMERGENCY DEPARTMENT     Brandon Kumar MD  04/12/17 0641

## 2017-04-13 LAB
CHOLEST SERPL-MCNC: 213 MG/DL
HDLC SERPL-MCNC: 75 MG/DL
HIV 1+2 AB+HIV1 P24 AG SERPL QL IA: NORMAL
LDLC SERPL CALC-MCNC: 102 MG/DL
NONHDLC SERPL-MCNC: 138 MG/DL
TRIGL SERPL-MCNC: 179 MG/DL

## 2017-04-13 PROCEDURE — 99207 ZZC CONSULT E&M CHANGED TO INITIAL LEVEL: CPT | Performed by: PHYSICIAN ASSISTANT

## 2017-04-13 PROCEDURE — 25000132 ZZH RX MED GY IP 250 OP 250 PS 637: Performed by: STUDENT IN AN ORGANIZED HEALTH CARE EDUCATION/TRAINING PROGRAM

## 2017-04-13 PROCEDURE — 86780 TREPONEMA PALLIDUM: CPT | Performed by: PHYSICIAN ASSISTANT

## 2017-04-13 PROCEDURE — 99222 1ST HOSP IP/OBS MODERATE 55: CPT | Performed by: PHYSICIAN ASSISTANT

## 2017-04-13 PROCEDURE — 87389 HIV-1 AG W/HIV-1&-2 AB AG IA: CPT | Performed by: PHYSICIAN ASSISTANT

## 2017-04-13 PROCEDURE — 80061 LIPID PANEL: CPT | Performed by: STUDENT IN AN ORGANIZED HEALTH CARE EDUCATION/TRAINING PROGRAM

## 2017-04-13 PROCEDURE — 12400001 ZZH R&B MH UMMC

## 2017-04-13 PROCEDURE — 36415 COLL VENOUS BLD VENIPUNCTURE: CPT | Performed by: PHYSICIAN ASSISTANT

## 2017-04-13 PROCEDURE — 99223 1ST HOSP IP/OBS HIGH 75: CPT | Mod: GC | Performed by: PSYCHIATRY & NEUROLOGY

## 2017-04-13 PROCEDURE — 36415 COLL VENOUS BLD VENIPUNCTURE: CPT | Performed by: STUDENT IN AN ORGANIZED HEALTH CARE EDUCATION/TRAINING PROGRAM

## 2017-04-13 PROCEDURE — 86803 HEPATITIS C AB TEST: CPT | Performed by: PHYSICIAN ASSISTANT

## 2017-04-13 RX ADMIN — AMLODIPINE BESYLATE 10 MG: 2.5 TABLET ORAL at 10:08

## 2017-04-13 RX ADMIN — LISINOPRIL 30 MG: 20 TABLET ORAL at 10:07

## 2017-04-13 RX ADMIN — Medication 100 MG: at 10:06

## 2017-04-13 RX ADMIN — Medication 25 MG: at 19:44

## 2017-04-13 RX ADMIN — ASPIRIN 81 MG: 81 TABLET, COATED ORAL at 10:06

## 2017-04-13 RX ADMIN — LABETALOL HCL 200 MG: 200 TABLET, FILM COATED ORAL at 19:44

## 2017-04-13 RX ADMIN — RISPERIDONE 1 MG: 1 TABLET ORAL at 22:32

## 2017-04-13 RX ADMIN — RANITIDINE HYDROCHLORIDE 150 MG: 150 TABLET, FILM COATED ORAL at 19:44

## 2017-04-13 RX ADMIN — VITAMIN D, TAB 1000IU (100/BT) 2000 UNITS: 25 TAB at 10:06

## 2017-04-13 RX ADMIN — MULTIPLE VITAMINS W/ MINERALS TAB 1 TABLET: TAB at 10:05

## 2017-04-13 RX ADMIN — LABETALOL HCL 200 MG: 200 TABLET, FILM COATED ORAL at 10:08

## 2017-04-13 RX ADMIN — FOLIC ACID 1 MG: 1 TABLET ORAL at 10:06

## 2017-04-13 RX ADMIN — RANITIDINE HYDROCHLORIDE 150 MG: 150 TABLET, FILM COATED ORAL at 10:06

## 2017-04-13 RX ADMIN — ALBUTEROL SULFATE 2 PUFF: 90 AEROSOL, METERED RESPIRATORY (INHALATION) at 22:32

## 2017-04-13 ASSESSMENT — ACTIVITIES OF DAILY LIVING (ADL)
DRESS: 0-->INDEPENDENT
SWALLOWING: 0-->SWALLOWS FOODS/LIQUIDS WITHOUT DIFFICULTY
COGNITION: 0 - NO COGNITION ISSUES REPORTED
TRANSFERRING: 0-->INDEPENDENT
GROOMING: INDEPENDENT
TOILETING: 0-->INDEPENDENT
RETIRED_COMMUNICATION: 0-->UNDERSTANDS/COMMUNICATES WITHOUT DIFFICULTY
RETIRED_EATING: 0-->INDEPENDENT
FALL_HISTORY_WITHIN_LAST_SIX_MONTHS: NO
AMBULATION: 0-->INDEPENDENT
BATHING: 0-->INDEPENDENT

## 2017-04-13 NOTE — PROGRESS NOTES
----------------------------------------------------------------------------------------------------------  [facility], [place]   Psychiatric Progress Note     Assessment    Presentation: Bryon Alonzo is a 32 year old male with a significant past psychiatric history of , bipolar disorder, polysubstance use disorder (Cocaine/Meth), who presented to [facility] ER with due to suicidality after relapsing on cocaine    Diagnostic Impression: At ED he reports feeling very anxious and out of control along with hearing voices. The MSE is notable for a sedated, cooperative patient who Just received olanzapine due to severity of his symptoms . His Utox was positive for cannabinoids and cocaine. Patient's current presentation is consistent with historic diagnosis of cannabinoids/ cocaine intoxication with perceptual disturbance because of 1) reported recent use of cocaine 2) sever anxiety and hypervigilance , and 3) SOB and psychomotor agitation, which is not best describable by another medical or mental health diagnosis. Cocaine use, has consumed his life and he has been in and out of hospital for cocaine use, for mental and medical crisis only in the past year. Severe cocaine use disorder is the primary diagnosis of concern at this hospitalization.     Hospital course: Bryon Alonzo was admitted to station 22  as a voluntary patient and PTA medications started. Admission labs were sent and suicidal/withdrawal precautions ordered. Patient was placed on MSSA given the hx of ETOH use do and recent heavy use. Patient slept for the most part of the first day of admission. He met the team next morning and he expressed his willing to stay sober and clean therefore requested commitment and CD treatment route.     Medical course : Patient has a complicated medical history and IM consult was placed.     Plan     Principal Diagnosis:   # Cocaine/cannabinoids intoxication:  # Bipolar  disorder     Medications:   New:   None     Continue/restart:  - Trazodone PRN for sleep  - Risperidone 1 mg QHS  - Folic Acid  - Multivitamin  - Thiamin  - Zyprexa PO/IM for psychiatric emergencies  - Hydroxyzine 25-50 mg PRN for anxiety     Other treatments:  - MSSA Protocol with lorazepam     * Med list to be confirmed by patient.      Laboratory/Imaging: Admission labs ordered     Consults:   None     Patient will be treated in therapeutic milieu with appropriate individual and group therapies as described.     Secondary psychiatric diagnoses of concern this admission:      - Bipolar disorder type II, depressed state: The diagnosis is self reported and not confirmed by team yet.      Medical diagnoses to be addressed this admission:    Medications:      # h/o non-ischemic cardiomyopathy: Patient presented with SOB. Troponin I elevated , not changed from base line. EKG with non specific changes , not suggestive of STEMI.   - ASA tab 81 mg daily  - Recheck troponin I per ED provider note     # HTN/CKD:   - Amlodipine tab 10 mg  - Labetalol tab 200 mg BID  - lisinopril tab 30 mg daily     # asthma  - Albuterol inhaler      #GERD:  - Cont zantac      #hyperlipidemia: results suggestive of hyperlipidemia. Patient was once on Atorvastatin. Unclear why it was stopped.   - Lipid profile     Consults:  - IM conuslt placed, appreciate their recomms     Relevant psychosocial stressors: Not known     Legal Status: Voluntary     Safety Assessment:   Checks: Status 15  Precautions: Suicide  Substance Withdrawal  Pt has not required locked seclusion or restraints in the past 24 hours to maintain safety, please refer to RN documentation for further details.    The risks, benefits, alternatives and side effects have been discussed and are understood by the patient and other caregivers.     Anticipated Disposition/Discharge Date: Unknown at this time. Pending further clinical evaluation and stabilization.     Pt seen and  "discussed with my attending, Dr. Crawford.   Melissa Jimenez MD  PGY1 Psychiatry Resident  Pager: [phone]    Psychiatry Attending Attestation:  This patient has been seen and evaluated by me, Fernando Crawford M.D.  The patient's condition and treatment plan were discussed with the resident, and care coordinated with the CTC and RN. I reviewed, edited and agree with the findings and plan in this note.    Patient known to me from past admissions. Very refractory cocaine use, many admissions last year, was clean for past 6 months, but then cashed in some \"assets\", which I believe are royalties his from [employer]. Spent about $5K on crack and powder in a week. Doesn't know shy he relapsed now. Not attending any meetings after commitment . Stopped risperidone about that time. Past Dx of bipolar disorder has been questioned by recent psychiatrists, including Dr. Ferreira and me. Unclear if he needs risperidone long-term but it will help resolve his psychosis now.     Fernando Crawford M.D.   of Psychiatry   Interim History:   The patient's care was discussed with the treatment team and chart notes were reviewed.    Sleep: 7: Hypertensive otherwise normal and stable:note:reported no sleep for 4 days, sleeping soundly, decline some of cares to sleep.     Patient slept for the most part of the first day of admission. He met the team today at conference room. He reported that he \"did it again\" and is \"feeling guilty\". He acknowledged that cocaine use is harming him medically, mentally and socially and  he expressed his willing to stay sober and clean therefore requested commitment and CD treatment route.  Team reviewed his history of hospitalization and discussed that [facility] might not support his request for funding in the grounds of his non adherence to treatment plan. As an example, team discussed his non attendance at day treatment. Patient reported that he was working in " "that time and the fact that when he is sober, \"my brain works and I make a lot of money\". 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 He reports no previous intention for selling these albums and no plan to spend this money. That's why he ended up spending $5k worth of cocaine on the day that his commitment ended and smoked/snorted it in a course of 4 days. He states that the only thing that worked for him was commitment+ locked CD facility and he is requesting for it.     He denies any manic episode and reports that Dr. Ferreira was not convinced that he is suffering from mood disorder. It seems like his suicidality is an impulsive act following cocaine intoxication. Of note, patient has over 9 hospitalization related to cocaine use from January to September 2016 and the prolonged period of sobriety was following the discharge from  treatment program.     Patient will be presented in the professor's rounds for exploring the diagnosis and possible treatment plans.     Review of systems:     The Review of Systems is negative other than noted above.          Medications:     Current Facility-Administered Medications   Medication     albuterol (PROAIR HFA/PROVENTIL HFA/VENTOLIN HFA) Inhaler 2 puff     aspirin EC EC tablet 81 mg     cholecalciferol (vitamin D) tablet 2,000 Units     EPINEPHrine (ADRENALIN) injection 0.3 mg     folic acid (FOLVITE) tablet 1 mg     labetalol (NORMODYNE) tablet 200 mg     lisinopril (PRINIVIL/ZESTRIL) tablet 30 mg     multivitamin, therapeutic with minerals (THERA-VIT-M) tablet 1 tablet     ranitidine (ZANTAC) tablet 150 mg     risperiDONE (risperDAL) tablet 1 mg     OLANZapine (zyPREXA) injection 10 mg    Or     OLANZapine (zyPREXA) tablet 10 mg     hydrOXYzine (ATARAX) tablet 25-50 mg     LORazepam (ATIVAN) tablet 1-4 mg     thiamine tablet 100 mg     " "nicotine polacrilex (NICORETTE) gum 2 mg     traZODone (DESYREL) half-tab 25 mg             Allergies:     Allergies   Allergen Reactions     Banana Anaphylaxis            Psychiatric Examination:   /64  Pulse 66  Temp 98.7  F (37.1  C) (Tympanic)  Resp 16  Ht 1.676 m (5' 6\")  SpO2 97%  Weight is 0 lbs 0 oz  There is no height or weight on file to calculate BMI.    Appearance:  awake, alert, appeared as age stated and neatly groomed  Attitude:  cooperative  Eye Contact:  fair  Mood:  okay  Affect:  mood congruent, intensity is normal, constricted mobility and full range  Speech:  clear, coherent  Psychomotor Behavior:  no evidence of tardive dyskinesia, dystonia, or tics  Thought Process:  logical and goal oriented  Associations:  no loose associations  Thought Content:  no evidence of suicidal ideation or homicidal ideation  Insight:  fair  Judgment:  fair  Oriented to:  time, person, and place  Attention Span and Concentration:  intact  Recent and Remote Memory:  intact  Language: Able to name objects, Able to repeat phrases and Able to read and write   Fund of Knowledge: appropriate  Muscle Strength and Tone: normal  Gait and Station: Normal         Labs:     Recent Results (from the past 24 hour(s))   Lipid panel    Collection Time: 04/13/17  7:23 AM   Result Value Ref Range    Cholesterol 213 (H) <200 mg/dL    Triglycerides 179 (H) <150 mg/dL    HDL Cholesterol 75 >39 mg/dL    LDL Cholesterol Calculated 102 (H) <100 mg/dL    Non HDL Cholesterol 138 (H) <130 mg/dL   HIV Antigen Antibody Combo    Collection Time: 04/13/17 12:14 PM   Result Value Ref Range    HIV Antigen Antibody Combo  NR     Nonreactive   HIV-1 p24 Ag & HIV-1/HIV-2 Ab Not Detected         Antipsychotic Labs:  Recent Labs   Lab Test  04/13/17   0723  08/19/16   0718  05/25/16   0733   CHOL  213*  274*  177   TRIG  179*  181*  151*   LDL  102*  183*  98   HDL  75  55  49     Recent Labs   Lab Test  04/12/17   0414  09/21/16   0711  " 09/05/16   0926   GLC  132*  80  109*     Recent Labs   Lab Test  04/12/17   0414  09/05/16   0926  07/24/16   0532  07/23/16   1059   WBC  11.3*  8.8  4.5  4.4   ANEU  9.0*  5.9   --   2.5   HGB  13.4  12.9*  12.3*  12.7*   PLT  395  329  305  294

## 2017-04-13 NOTE — PROGRESS NOTES
Client refused 4 pm vital signs. MSSA score incomplete. He was sleeping soundly, snoring. He sat up to eat dinner in his room. Requested ginger ale after eating. He took vitamins and BP medication. This nurse encouraged him to allow vital signs next time so that we can make sure he is stable. He verbally agreed.

## 2017-04-13 NOTE — PROGRESS NOTES
Pt was isolative to room for shift sleeping. Pt ate meal in room. Pt checked in with writer briefly, but decided to go back to bed alf through check in. Pt stated they were doing well, until they relapsed about a week ago. Pt stated that they know they are in the right place right now and want to continue being sober. Pt states they are tired due to not sleeping for several days. Pt states they are still having some effects from drugs and thought are progressively getting clearer. Pt denies all other mental health symptoms.      04/12/17 2000   Behavioral Health   Hallucinations denies / not responding to hallucinations   Thinking distractable   Orientation person: oriented;place: oriented;date: oriented;time: oriented   Memory baseline memory   Insight insight appropriate to situation   Judgement intact   Eye Contact at examiner   Affect blunted, flat   Mood mood is calm   Physical Appearance/Attire attire appropriate to age and situation   Hygiene well groomed   Suicidality other (see comments)  (denies)   Self Injury other (see comment)  (denies)   Activity isolative   Speech clear;coherent   Medication Sensitivity no observed side effects;no stated side effects   Psychomotor / Gait balanced;steady   Psycho Education   Type of Intervention 1:1 intervention   Response participates with encouragement   Hours 0.5   Treatment Detail check in    Group Therapy Session   Group Attendance attended group session   Time Session Began 1700   Time Session Ended 1730   Total Time (minutes) 30   Group Type community   Patient Participation/Contribution cooperative with task   Activities of Daily Living   Hygiene/Grooming independent   Oral Hygiene independent   Dress independent   Room Organization independent   Activity   Activity Level of Assistance independent

## 2017-04-13 NOTE — PLAN OF CARE
Problem: General Plan of Care (Inpatient Behavioral)  Goal: Team Discussion  Team Plan:   BEHAVIORAL TEAM DISCUSSION     Continued Stay Criteria/Rationale: New admission admitted due to psychosis and suicidal ideation   Plan: Restart medication to help with thoughts and secure a CD assessment to get patient into treatment.   Participants: Zuleika Gaines [facility],   Namrata Sharpe RN,  Dr. Fernando Crawford MD,  Dr. Melissa Jimenez MD, Suzanna Carreon MS3  Summary/Recommendation: Patient stated he relapsed after getting a large sum of money. Patient discussed thoughts of not having bipolar as he is stable in his thinking when he is sober.   Progress: Initiated

## 2017-04-13 NOTE — CONSULTS
Internal Medicine Initial Visit    Bryon Alonzo MRN# [id number]   Age: 32 year old YOB: 1985   Date of Admission: 4/12/2017    ADMIT DATE: 4/12/2017  DATE OF CONSULT: 4/13/2017    PCP: Pantera Oshea    REQUESTING SERVICE: Psychiatry  REASON FOR CONSULT: Elevated troponin, HTN, GERD    CHIEF COMPLAINT: Cocaine use    HPI: Bryon Alonzo is a 32 year old male with a past medical history of morbid obesity, atypical chest pain, HTN, GERD, mild persistent asthma, bipolar disorder and polysubtance abuse who is admitted [facility] for suicidal ideation, cocaine abuse x4 days.     The patient notes that he got a large sum of money all at once and decided to go on a cocaine binge for 4 days prior to admission. He notes that he was sober for 6 months. He smokes or snorts cocaine. No IVDU. Notes risky sexual practices but denies current STI symptoms.    He notes that his BP has been well controlled. Asthma is well controlled. GERD is well controlled.    He has no medical complaints.      ROS:   10 point ROS asked and otherwise negative, with exceptions as noted above in HPI.     PMH:  Past Medical History:   Diagnosis Date     Asthma      Bipolar disorder (H)      Chronic kidney disease      Hyperlipidemia      Hypertension      LVH (left ventricular hypertrophy) 2015     Polysubstance abuse     Methamphetamine, Cocaine, Alcohol, THC, LSD       PSH:  Past Surgical History:   Procedure Laterality Date     NO HISTORY OF SURGERY         MEDICATIONS    No current facility-administered medications on file prior to encounter.   Current Outpatient Prescriptions on File Prior to Encounter:  LABETALOL HCL PO Take by mouth 2 times daily   lisinopril (PRINIVIL,ZESTRIL) 2.5 MG tablet Take 12 tablets (30 mg) by mouth daily   amLODIPine (NORVASC) 10 MG tablet Take 1 tablet (10 mg) by mouth daily   traZODone (DESYREL) 50 MG tablet Take 2 tablets (100 mg) by mouth At Bedtime   folic acid (FOLVITE) 1 MG tablet Take 1  "tablet (1 mg) by mouth daily   multivitamin, therapeutic with minerals (THERA-VIT-M) TABS Take 1 tablet by mouth daily   cholecalciferol 2000 UNITS tablet Take 2,000 Units by mouth daily   aspirin 81 MG EC tablet Take 1 tablet (81 mg) by mouth daily   albuterol (2.5 MG/3ML) 0.083% nebulizer solution Take 1 vial (2.5 mg) by nebulization every 6 hours as needed for shortness of breath / dyspnea or wheezing   albuterol (PROAIR HFA, PROVENTIL HFA, VENTOLIN HFA) 108 (90 BASE) MCG/ACT inhaler Inhale 2 puffs into the lungs every 6 hours as needed for shortness of breath / dyspnea   loratadine (CLARITIN) 10 MG tablet Take 1 tablet (10 mg) by mouth daily as needed for allergies   risperiDONE (RISPERDAL) 1 MG tablet Take 1 tablet (1 mg) by mouth At Bedtime   ranitidine (ZANTAC) 150 MG tablet Take 1 tablet (150 mg) by mouth 2 times daily (before meals)   EPINEPHrine 0.3 MG/0.3ML injection 2-pack Inject 0.3 mLs (0.3 mg) into the muscle once as needed for anaphylaxis       ALLERGIES:     Allergies   Allergen Reactions     Banana Anaphylaxis       FAMILY HISTORY:  Family History   Problem Relation Age of Onset     DIABETES Mother      Hypertension Mother      Coronary Artery Disease Father      KIDNEY DISEASE Father        SOCIAL HISTORY:  Social History     Social History     Marital status: Single     Spouse name: N/A     Number of children: N/A     Years of education: N/A     Social History Main Topics     Smoking status: Current Some Day Smoker     Smokeless tobacco: None     Alcohol use 0.0 oz/week     0 Standard drinks or equivalent per week      Comment: One pint per day of vodka per day for past five days     Drug use: Yes     Special: Methamphetamines, Cocaine      Comment: \"i've been on a 4 day miranda\"     Sexual activity: Yes     Partners: Female     Other Topics Concern     None     Social History Narrative    Intake 9/6/16        Social History:    - Born in [facility] and raised in home by single mom.    - The " "patient is one of several siblings. One brother  from accident while high on ecstasy.    - Parents never .    - Financial situation: on [facility] assistance    - Social support: reports good friends who are sober are able to support him.    - Relationship: Single Has a 2 and 4 year old boys, (the kids share the same birthday) with one woman.    - Highest education attained: graduated HS.      - Employment: Unemployed, not seeking work.          Substance use History:    - Started using at age 10- THC and EtOH and then eventually heavy use of PCP followed by heavy use of LSD. Later started using cocaine and methamphetamine which are his current drugs of choice. No hx of IV drug use. Last binge 2016    - No hx of complicated withdrawal including DTs or seizures.    - Previous CD treatment: Several, most recent was [facility] in [place] in February    - Periods of abstinence: Had been sober for past 5 months until recent relapse       PHYSICAL EXAM:  Blood pressure 116/64, pulse 69, temperature 97.4  F (36.3  C), temperature source Oral, resp. rate 18, height 1.676 m (5' 6\"), SpO2 97 %.    GENERAL: Alert and orientated x 3. Appears in no acute distress.   HEENT: Anicteric sclera. Mucous membranes moist and without lesions.   CV: RRR, S1S2, no murmurs appreciated.  RESPIRATORY: Respirations regular, even, and unlabored. Lungs CTAB with no wheezing, rales, rhonchi.   GI: Soft and non distended with normoactive bowel sounds present in all quadrants. No tenderness, rebound, guarding.   EXTREMITIES: No peripheral edema. 2+ bilateral pedal pulses.   NEUROLOGIC: CN II-XII grossly intact. No focal deficits.   MUSCULOSKELETAL: No joint swelling or tenderness.   SKIN: No jaundice. No rashes or lesions.     LABS:  CMP  Recent Labs  Lab 17  0414      POTASSIUM 3.4   CHLORIDE 102   CO2 25   ANIONGAP 10   *   BUN 16   CR 1.50*   GFRESTIMATED 54*   GFRESTBLACK 65   KATY 8.3*     CBC  Recent Labs  Lab " 04/12/17  0414   WBC 11.3*   RBC 4.58   HGB 13.4   HCT 39.3*   MCV 86   MCH 29.3   MCHC 34.1   RDW 14.5        INRNo lab results found in last 7 days.    IMAGING: None to review from this admission    ASSESSMENT & RECOMMENDATIONS:  #Depression, bipolar disorder, suicidal ideation  -Management per psychiatry  #polysubstance abuse: Utox positive for cocaine and cannabinoids. Hep B, C and HIV non reactive in 10/2016. Denies recent IVDU.  -STI testing ordered per patient request, medicine will follow  #Abnormal trop in setting of cocaine abuse: Patient endorses cocaine abuse x4 days, last use 9 pm 04/11. Trop on presentation to ED 04/12 0.057, trended down to 0.052 by DC from ED and now normalized yesterday afternoon. Actually appears to be chronically elevated when checked at this facility dating back to 01/2016. EKG w/ NSR, no ST segment elevation/depression, T waves appear normal. Ang[place]ogram in 12/[place] [facility] w/ normal coronary arteries. Abnormalities all likely related to cocaine abuse.   -Continue aspirin and BP management w/ ACE and BB as below  -No need for further work up while inpatient if asymptomatic, would not trend trops   -Patient should abstain from cocaine going forward, would benefit from treatment and social work involvement  #HTN, LVH: ECHO 1/2016 at [facility] w/ EF of 70%, concentric LVH (likely due to BP). On Lisinopril 30 mg qday, labetalol 200 mg BID. Goal BP <130/90 given CKD. Normotensive overnight.  -Continue home medications  -DCed amlodipine as not taking PTA, could consider re adding pending BP trends  -Notify medicine if persistently >130/90 despite home medications for consideration of discussing w/ OP nephrologist  #CKD stage 2: Patient w/ baseline creatinine of 1.5 w/ GFR of 65, at baseline on admission. Follows with nephrology at [facility]. Likely due to HTN, non compliance in setting of substance abuse.  -Avoid nephrotoxics as able  -Continue FU with established PCP and nephrologist at  [facility]  #Morbid obesity: Follows with bariatrics and nutrition team and [facility], considering gastric sleeve. Needs to be sober x1 year prior to surgery.  -Continue to follow up as OP w/ bariatric surgery and nutrition  #Dyslipidemia: Lipid panel w/ mildly elevated T chol at 213 (274), LDL of 102 (181), HDL 75 (55),  (181), much improved from lipid panel in 08/2016 w/o medication management.   -FU w/ PCP for consideration of statin or further medications on discharge  -Encouraged to continue lifestyle modifications and provided encouragement  #GERD: Endorses stable symptoms. Continue zantac.   #Asthma, mild persistent: On flunisolide BID w/ PRN albuterol PTA. Continue.     I appreciate the opportunity to participate in the care of this patient. Medicine will sign off, please notify on call TYLER if any intercurrent medical issues arise.     Poppy Lagos PA-C

## 2017-04-13 NOTE — PROGRESS NOTES
Shows little motivation. Visible at meals then returns to room. Pleasant and approachable. No groups.       04/13/17 1147   Behavioral Health   Thinking distractable;poor concentration   Orientation person: oriented;place: oriented   Insight poor   Judgement impaired   Affect blunted, flat;sad   Mood depressed   Physical Appearance/Attire attire appropriate to age and situation   Hygiene (Typically showers daily)   Activity isolative;withdrawn   Speech clear;coherent   Safety   Suicidality status 15

## 2017-04-14 VITALS
OXYGEN SATURATION: 97 % | DIASTOLIC BLOOD PRESSURE: 76 MMHG | SYSTOLIC BLOOD PRESSURE: 146 MMHG | HEART RATE: 67 BPM | TEMPERATURE: 97.4 F | RESPIRATION RATE: 18 BRPM | HEIGHT: 66 IN

## 2017-04-14 LAB
HCV AB SERPL QL IA: NORMAL
T PALLIDUM IGG+IGM SER QL: NEGATIVE

## 2017-04-14 PROCEDURE — 99238 HOSP IP/OBS DSCHRG MGMT 30/<: CPT | Mod: GC | Performed by: PSYCHIATRY & NEUROLOGY

## 2017-04-14 PROCEDURE — 25000132 ZZH RX MED GY IP 250 OP 250 PS 637: Performed by: STUDENT IN AN ORGANIZED HEALTH CARE EDUCATION/TRAINING PROGRAM

## 2017-04-14 RX ADMIN — Medication 100 MG: at 08:57

## 2017-04-14 RX ADMIN — LISINOPRIL 30 MG: 20 TABLET ORAL at 08:57

## 2017-04-14 RX ADMIN — MULTIPLE VITAMINS W/ MINERALS TAB 1 TABLET: TAB at 08:57

## 2017-04-14 RX ADMIN — VITAMIN D, TAB 1000IU (100/BT) 2000 UNITS: 25 TAB at 08:57

## 2017-04-14 RX ADMIN — ASPIRIN 81 MG: 81 TABLET, COATED ORAL at 08:56

## 2017-04-14 RX ADMIN — FOLIC ACID 1 MG: 1 TABLET ORAL at 08:57

## 2017-04-14 RX ADMIN — LABETALOL HCL 200 MG: 200 TABLET, FILM COATED ORAL at 08:57

## 2017-04-14 RX ADMIN — RANITIDINE HYDROCHLORIDE 150 MG: 150 TABLET, FILM COATED ORAL at 08:57

## 2017-04-14 ASSESSMENT — ACTIVITIES OF DAILY LIVING (ADL)
DRESS: STREET CLOTHES;INDEPENDENT
GROOMING: INDEPENDENT
ORAL_HYGIENE: INDEPENDENT

## 2017-04-14 NOTE — PLAN OF CARE
"Problem: Depressive Symptoms  Goal: Depressive Symptoms  Signs and symptoms of listed problems will be absent or manageable.   -pt will remain safe without any self harm during hospitalization.  -pt will have decreased thoughts of self harm and or suicidal ideation.  -pt will report improved sleep.  -pt will have adequate daily oral intake.  -pt will have improvement in self care and person hygiene.  -pt will spend time out of their room.  -pt will express decrease feelings of hopelessness.   Outcome: Adequate for Discharge Date Met:  04/14/17  \"I need to get out of here as soon as possible because the [facility] 25 will be closing and I need to meet with them\". Patient said he was \"awesome\" this morning. Denies suicidal thoughts.  Read over discharge instructions with patient and answered questions. Took cab with voucher to [facility] at [facility] this afternoon and then will go home.       "

## 2017-04-14 NOTE — PROGRESS NOTES
"Initial Psychosocial Assessment    I have reviewed the chart, met with the patient, and developed Care Plan.  Information for assessment was obtained from:     Patient: Interviewed and Chart: Reviewed    Presenting Problem:  Bryon Alonzo is a 32 year old male with a significant past psychiatric history of  , bipolar disorder, polysubstance use disorder (Cocaine/Meth), who presented to [facility] ER with due to suicidality after relapsing on cocaine.    History of Mental Health and Chemical Dependency:  Inpatient treatment: >12 inpatient treatments, most recent [facility] [address] [facility] in June. Has had multiple at [facility] and was committed while living in [place]  twice following a manic episode.  - Suicide attempts: This is 4th time for pt    Family Description (Constellation, Family Psychiatric History):  - Substance use: Multigenerational use of alcohol and substances in the family.  - No completed suicides in relatives.    Significant Life Events (Illness, Abuse, Trauma, Death):  None stated    Living Situation:  Lives with mother     Educational Background:  High School grad    Occupational History:  Currently unemployed has a history of working as a      Financial Status:  [place]    Legal Issues:  Recently released from a Stay of Commitment     Ethnic/Cultural Issues:  American     Spiritual Orientation:  \"I'm not Methodist, but I am spiritual.\"     Service History:  None    Social Functioning (organization, interests):  Had recently been working but relapsed and does not engage with others while using.    Current Treatment Providers are:  Dr. Oshea [facility]- [address] [place] [address], [address] [place] [address]. Phone: [phone] Fax: [phone]    Social Service Assessment/Plan:  Patient has been admitted for psychiatric stabilization for this acute crisis. Patient will meet with treatment team including a psychiatrist to have psychiatric assessment and " medication management. Team will coordinate with the any outpatient medication providers to review and adjust medications as appropriate. Staff will provide therapeutic programming and structure to help maintain a safe environment for healing. Staff will continue to assess patient as needed. Patient will participate in unit groups and activities. Patient will receive individual and group support on the unit. CTC will coordinate with outpatient providers and will place referrals to ensure appropriate follow up care is in place. Patient will require a [facility] 25 assessment to help inform treatment needs.

## 2017-04-14 NOTE — DISCHARGE INSTRUCTIONS
Behavioral Discharge Planning and Instructions      Summary:  You were admitted on 4/12/2017  For Bipolar D/O and Suicidal Ideations.  You were treated by Dr. Fernando Crawford MD and discharged on 4/14/2017 from [facility]. You have began to stabilize after restarting your medication and being cocaine free. Upon discharge you are to complete a [facility] assessment to secure placement in a treatment facility to help with your continued sobriety and stabilization.     Main Diagnosis: Bipolar Disorder    Health Care Follow-up Appointments:     Dr. Oshea- Monday April 17th 10:30am   [facility]- [address], [address] [place] [address].   Phone: [phone] Fax: [phone]    Attend all scheduled appointments with your outpatient providers. Call at least 24 hours in advance if you need to reschedule an appointment to ensure continued access to your outpatient providers.   Major Treatments, Procedures and Findings:  You were provided with: a psychiatric assessment, assessed for medical stability, medication evaluation and/or management and milieu management    Symptoms to Report: increased confusion, losing more sleep, mood getting worse or thoughts of suicide    Early warning signs can include: increased depression or anxiety sleep disturbances increased thoughts or behaviors of suicide or self-harm  increased unusual thinking, such as paranoia or hearing voices    Safety and Wellness:  Take all medicines as directed.  Make no changes unless your doctor suggests them.      Follow treatment recommendations.  Refrain from alcohol and non-prescribed drugs.  If there is a concern for safety, call 911.    Resources:   Crisis Intervention: 844.530.6956 or 158-710-0283 (TTY: 110.363.6059).  Call anytime for help.  National [place] on Mental Illness (www.[place].nba.org): 866.735.6928 or 581-592-6631.  Alcoholics Anonymous (www.alcoholics-anonymous.org): Check your phone book for your local  chapter.  Suicide Awareness Voices of Education (SAVE) (www.save.org): 190-863-YMFO (7283)  National Suicide Prevention Line (www.mentalhealthmn.org): 426-549-ZXOU (7686)  Mental Health Consumer/Survivor Network of [place] (www.mhcsn.net): 165.595.3592 or 632-709-2517  Self- Management and Recovery Training., SMART-- Toll free: 602.613.2675  www.[employer]  [facility] Crisis (COPE) Response - Adult 541 390-9534  [facility] Health Crisis Team - Child: 152.181.7248    The treatment team has appreciated the opportunity to work with you.     If you have any questions or concerns our unit number is [phone]  You may be receiving a follow-up phone call within the next three days from a representative from behavioral health.

## 2017-04-14 NOTE — DISCHARGE SUMMARY
----------------------------------------------------------------------------------------------------------  [facility], [place]   Discharge Summary      Bryon Alonzo MRN# [id number]   Age: 32 year old YOB: 1985     Date of Admission:  4/12/2017  Date of Discharge:  4/14/2017  Admitting Physician:  Fernando Crawford MD  Discharge Physician:  Fernando Crawford MD         Event Leading to Hospitalization:     Bryon Alonzo is a 32 year old male with a significant past psychiatric history of , bipolar disorder, polysubstance use disorder (Cocaine/Meth), who presented to [facility] ER with due to suicidality after relapsing on cocaine.       See Admission note by Fernando Crawford MD on 4/12/17 for additional details.          Diagnoses:     Cocaine Use Disorder.          Labs:     Results for orders placed or performed during the hospital encounter of 04/12/17   Drug abuse screen 6 urine (tox)   Result Value Ref Range    Amphetamine Qual Urine  NEG     Negative   Cutoff for a negative amphetamine is 500 ng/mL or less.      Barbiturates Qual Urine  NEG     Negative   Cutoff for a negative barbiturate is 200 ng/mL or less.      Benzodiazepine Qual Urine  NEG     Negative   Cutoff for a negative benzodiazepine is 200 ng/mL or less.      Cannabinoids Qual Urine (A) NEG     Positive   Cutoff for a positive cannabinoid is greater than 50 ng/mL. This is an   unconfirmed screening result to be used for medical purposes only.      Cocaine Qual Urine (A) NEG     Positive   Cutoff for a positive cocaine is greater than 300 ng/mL. This is an unconfirmed   screening result to be used for medical purposes only.      Ethanol Qual Urine  NEG     Negative   Cutoff for a negative urine ethanol is 0.05 g/dL or less      Opiates Qualitative Urine  NEG     Negative   Cutoff for a negative opiate is 300 ng/mL or less.     CBC with platelets differential   Result Value Ref Range    WBC 11.3 (H) 4.0  - 11.0 10e9/L    RBC Count 4.58 4.4 - 5.9 10e12/L    Hemoglobin 13.4 13.3 - 17.7 g/dL    Hematocrit 39.3 (L) 40.0 - 53.0 %    MCV 86 78 - 100 fl    MCH 29.3 26.5 - 33.0 pg    MCHC 34.1 31.5 - 36.5 g/dL    RDW 14.5 10.0 - 15.0 %    Platelet Count 395 150 - 450 10e9/L    Diff Method Automated Method     % Neutrophils 79.5 %    % Lymphocytes 11.5 %    % Monocytes 7.4 %    % Eosinophils 1.1 %    % Basophils 0.3 %    % Immature Granulocytes 0.2 %    Nucleated RBCs 0 0 /100    Absolute Neutrophil 9.0 (H) 1.6 - 8.3 10e9/L    Absolute Lymphocytes 1.3 0.8 - 5.3 10e9/L    Absolute Monocytes 0.8 0.0 - 1.3 10e9/L    Absolute Eosinophils 0.1 0.0 - 0.7 10e9/L    Absolute Basophils 0.0 0.0 - 0.2 10e9/L    Abs Immature Granulocytes 0.0 0 - 0.4 10e9/L    Absolute Nucleated RBC 0.0    Basic metabolic panel   Result Value Ref Range    Sodium 138 133 - 144 mmol/L    Potassium 3.4 3.4 - 5.3 mmol/L    Chloride 102 94 - 109 mmol/L    Carbon Dioxide 25 20 - 32 mmol/L    Anion Gap 10 3 - 14 mmol/L    Glucose 132 (H) 70 - 99 mg/dL    Urea Nitrogen 16 7 - 30 mg/dL    Creatinine 1.50 (H) 0.66 - 1.25 mg/dL    GFR Estimate 54 (L) >60 mL/min/1.7m2    GFR Estimate If Black 65 >60 mL/min/1.7m2    Calcium 8.3 (L) 8.5 - 10.1 mg/dL   Troponin I (now + 6 & 12 hrs)   Result Value Ref Range    Troponin I ES 0.057 (H) 0.000 - 0.045 ug/L   Troponin I   Result Value Ref Range    Troponin I ES 0.052 (H) 0.000 - 0.045 ug/L   Troponin I (now + 6 & 12 hrs)   Result Value Ref Range    Troponin I ES 0.041 0.000 - 0.045 ug/L   Lipid panel   Result Value Ref Range    Cholesterol 213 (H) <200 mg/dL    Triglycerides 179 (H) <150 mg/dL    HDL Cholesterol 75 >39 mg/dL    LDL Cholesterol Calculated 102 (H) <100 mg/dL    Non HDL Cholesterol 138 (H) <130 mg/dL   Anti Treponema   Result Value Ref Range    Treponema pallidum Antibody Negative NEG   Hepatitis C antibody   Result Value Ref Range    Hepatitis C Antibody  NR     Nonreactive   Assay performance characteristics  have not been established for newborns,   infants, and children     HIV Antigen Antibody Combo   Result Value Ref Range    HIV Antigen Antibody Combo  NR     Nonreactive   HIV-1 p24 Ag & HIV-1/HIV-2 Ab Not Detected     EKG 12 lead   Result Value Ref Range    Interpretation ECG Click View Image link to view waveform and result    Internal Medicine Hospitalist Adult IP Consult - [facility] - [place], [place]: Patient to be seen: Routine within 24 hrs; Call back #: [phone], pager number: [phone]; Increased Troponins with nonspecific EKG post cocaine use; Consultant may enter orders: Yes    Narrative    Poppy Lagos PA-C     4/13/2017 11:53 AM    Internal Medicine Initial Visit    Bryon Alonzo MRN# [id number]   Age: 32 year old YOB: 1985   Date of Admission: 4/12/2017    ADMIT DATE: 4/12/2017  DATE OF CONSULT: 4/13/2017    PCP: Pantera Oshea    REQUESTING SERVICE: Psychiatry  REASON FOR CONSULT: Elevated troponin, HTN, GERD    CHIEF COMPLAINT: Cocaine use    HPI: Bryon Alonzo is a 32 year old male with a past medical   history of morbid obesity, atypical chest pain, HTN, GERD, mild   persistent asthma, bipolar disorder and polysubtance abuse who is   admitted to [facility] for suicidal ideation, cocaine abuse x4   days.     The patient notes that he got a large sum of money all at once   and decided to go on a cocaine binge for 4 days prior to   admission. He notes that he was sober for 6 months. He smokes or   snorts cocaine. No IVDU. Notes risky sexual practices but denies   current STI symptoms.    He notes that his BP has been well controlled. Asthma is well   controlled. GERD is well controlled.    He has no medical complaints.      ROS:   10 point ROS asked and otherwise negative, with exceptions as   noted above in HPI.     PMH:  Past Medical History:   Diagnosis Date     Asthma      Bipolar disorder (H)      Chronic kidney disease      Hyperlipidemia      Hypertension      LVH (left ventricular  hypertrophy) 2015     Polysubstance abuse     Methamphetamine, Cocaine, Alcohol, THC, LSD       PSH:  Past Surgical History:   Procedure Laterality Date     NO HISTORY OF SURGERY         MEDICATIONS    No current facility-administered medications on file prior to   encounter.   Current Outpatient Prescriptions on File Prior to Encounter:  LABETALOL HCL PO Take by mouth 2 times daily   lisinopril (PRINIVIL,ZESTRIL) 2.5 MG tablet Take 12 tablets (30   mg) by mouth daily   amLODIPine (NORVASC) 10 MG tablet Take 1 tablet (10 mg) by mouth   daily   traZODone (DESYREL) 50 MG tablet Take 2 tablets (100 mg) by mouth   At Bedtime   folic acid (FOLVITE) 1 MG tablet Take 1 tablet (1 mg) by mouth   daily   multivitamin, therapeutic with minerals (THERA-VIT-M) TABS Take 1   tablet by mouth daily   cholecalciferol 2000 UNITS tablet Take 2,000 Units by mouth daily     aspirin 81 MG EC tablet Take 1 tablet (81 mg) by mouth daily   albuterol (2.5 MG/3ML) 0.083% nebulizer solution Take 1 vial (2.5   mg) by nebulization every 6 hours as needed for shortness of   breath / dyspnea or wheezing   albuterol (PROAIR HFA, PROVENTIL HFA, VENTOLIN HFA) 108 (90 BASE)   MCG/ACT inhaler Inhale 2 puffs into the lungs every 6 hours as   needed for shortness of breath / dyspnea   loratadine (CLARITIN) 10 MG tablet Take 1 tablet (10 mg) by mouth   daily as needed for allergies   risperiDONE (RISPERDAL) 1 MG tablet Take 1 tablet (1 mg) by mouth   At Bedtime   ranitidine (ZANTAC) 150 MG tablet Take 1 tablet (150 mg) by mouth   2 times daily (before meals)   EPINEPHrine 0.3 MG/0.3ML injection 2-pack Inject 0.3 mLs (0.3 mg)   into the muscle once as needed for anaphylaxis       ALLERGIES:     Allergies   Allergen Reactions     Banana Anaphylaxis       FAMILY HISTORY:  Family History   Problem Relation Age of Onset     DIABETES Mother      Hypertension Mother      Coronary Artery Disease Father      KIDNEY DISEASE Father        SOCIAL HISTORY:  Social  "History     Social History     Marital status: Single     Spouse name: N/A     Number of children: N/A     Years of education: N/A     Social History Main Topics     Smoking status: Current Some Day Smoker     Smokeless tobacco: None     Alcohol use 0.0 oz/week     0 Standard drinks or equivalent per week      Comment: One pint per day of vodka per day for past five days     Drug use: Yes     Special: Methamphetamines, Cocaine      Comment: \"i've been on a 4 day miranda\"     Sexual activity: Yes     Partners: Female     Other Topics Concern     None     Social History Narrative    Intake 16        Social History:    - Born in [facility] and raised in home by single mom.    - The patient is one of several siblings. One brother  from   accident while high on ecstasy.    - Parents never .    - Financial situation: on [facility] assistance    - Social support: reports good friends who are sober are able to   support him.    - Relationship: Single Has a 2 and 4 year old boys, (the kids   share the same birthday) with one woman.    - Highest education attained: graduated HS.      - Employment: Unemployed, not seeking work.          Substance use History:    - Started using at age 10- THC and EtOH and then eventually   heavy use of PCP followed by heavy use of LSD. Later started   using cocaine and methamphetamine which are his current drugs of   choice. No hx of IV drug use. Last binge 2016    - No hx of complicated withdrawal including DTs or seizures.    - Previous CD treatment: Several, most recent was [facility] in [place]   in February    - Periods of abstinence: Had been sober for past 5 months until   recent relapse       PHYSICAL EXAM:  Blood pressure 116/64, pulse 69, temperature 97.4  F (36.3  C),   temperature source Oral, resp. rate 18, height 1.676 m (5' 6\"),   SpO2 97 %.    GENERAL: Alert and orientated x 3. Appears in no acute distress.   HEENT: Anicteric sclera. Mucous membranes moist and without "   lesions.   CV: RRR, S1S2, no murmurs appreciated.  RESPIRATORY: Respirations regular, even, and unlabored. Lungs   CTAB with no wheezing, rales, rhonchi.   GI: Soft and non distended with normoactive bowel sounds present   in all quadrants. No tenderness, rebound, guarding.   EXTREMITIES: No peripheral edema. 2+ bilateral pedal pulses.   NEUROLOGIC: CN II-XII grossly intact. No focal deficits.   MUSCULOSKELETAL: No joint swelling or tenderness.   SKIN: No jaundice. No rashes or lesions.     LABS:  CMP  Recent Labs  Lab 04/12/17  0414      POTASSIUM 3.4   CHLORIDE 102   CO2 25   ANIONGAP 10   *   BUN 16   CR 1.50*   GFRESTIMATED 54*   GFRESTBLACK 65   KATY 8.3*     CBC  Recent Labs  Lab 04/12/17 0414   WBC 11.3*   RBC 4.58   HGB 13.4   HCT 39.3*   MCV 86   MCH 29.3   MCHC 34.1   RDW 14.5        INRNo lab results found in last 7 days.    IMAGING: None to review from this admission    ASSESSMENT & RECOMMENDATIONS:  #Depression, bipolar disorder, suicidal ideation  -Management per psychiatry  #polysubstance abuse: Utox positive for cocaine and cannabinoids.   Hep B, C and HIV non reactive in 10/2016. Denies recent IVDU.  -STI testing ordered per patient request, medicine will follow  #Abnormal trop in setting of cocaine abuse: Patient endorses   cocaine abuse x4 days, last use 9 pm 04/11. Trop on presentation   to ED 04/12 0.057, trended down to 0.052 by DC from ED and now   normalized yesterday afternoon. Actually appears to be   chronically elevated when checked at this facility dating back to   01/2016. EKG w/ NSR, no ST segment elevation/depression, T waves   appear normal. [place] [facility] [place]/ normal coronary   arteries. Abnormalities all likely related to cocaine abuse.   -Continue aspirin and BP management w/ ACE and BB as below  -No need for further work up while inpatient if asymptomatic,   would not trend trops   -Patient should abstain from cocaine going forward, would benefit    from treatment and social work involvement  #HTN, LVH: ECHO 1/2016 at [facility] w/ EF of 70%, concentric LVH   (likely due to BP). On Lisinopril 30 mg qday, labetalol 200 mg   BID. Goal BP <130/90 given CKD. Normotensive overnight.  -Continue home medications  -DCed amlodipine as not taking PTA, could consider re adding   pending BP trends  -Notify medicine if persistently >130/90 despite home medications   for consideration of discussing w/ OP nephrologist  #CKD stage 2: Patient w/ baseline creatinine of 1.5 w/ GFR of 65,   at baseline on admission. Follows with nephrology at [facility]. Likely   due to HTN, non compliance in setting of substance abuse.  -Avoid nephrotoxics as able  -Continue FU with established PCP and nephrologist at [facility]  #Morbid obesity: Follows with bariatrics and nutrition team and   [facility], considering gastric sleeve. Needs to be sober x1 year prior   to surgery.  -Continue to follow up as OP w/ bariatric surgery and nutrition  #Dyslipidemia: Lipid panel w/ mildly elevated T chol at 213   (274), LDL of 102 (181), HDL 75 (55),  (181), much improved   from lipid panel in 08/2016 w/o medication management.   -FU w/ PCP for consideration of statin or further medications on   discharge  -Encouraged to continue lifestyle modifications and provided   encouragement  #GERD: Endorses stable symptoms. Continue zantac.   #Asthma, mild persistent: On flunisolide BID w/ PRN albuterol   PTA. Continue.     I appreciate the opportunity to participate in the care of this   patient. Medicine will sign off, please notify on call TYLER if any   intercurrent medical issues arise.     Poppy Lagos PA-C     Alcohol breath test POCT   Result Value Ref Range    Alcohol Breath Test 0.00 0.00 - 0.01              Consults:     Consultation during this admission received from internal medicine See above.  No medical intervention was indicated.           Hospital Course:     Bryon Alonzo was admitted to [facility] with  attending Fernando Crawford MD as a voluntary patient. The patient was placed under status 15 (15 minute checks) to ensure patient safety. MSSA protocol was initiated due to the patient's history of alcohol abuse and concern for withdrawal symptoms.  CBC, BMP and utox obtained.    On admission, there were discrepancy between the last recommendations and what patient initially reported at ED. Considering him being under influence, last recommended medication and dosing initiated.     Psychiatric Course:  PTA medications started. Admission labs were sent and suicidal/withdrawal precautions ordered. Patient was placed on MSSA given the hx of ETOH use do and recent heavy use. Patient slept for the most part of the first day of admission and he did not score on withdrawal scales. Due to history of cocaine related medial condition (non ischemic cardiomyopathy), IM team was following him from beginning, but no serious medical issues were active. He met the team next morning and he expressed his willing to stay sober and clean therefore requested commitment and CD treatment route. On 4/14 he reported a chronic decreased need for sleep (4hrs sleep), and denied depressed mood at any time. After an extensive questioning about his past, team could not pinpoint any manic episode however there were some elements of hypomania or cyclothymia. He endorsed impulsive behavior including shopping sprees, drugs and sex, only when he celebrates for his achievements in producing. He mentioned that he was taking risperidone every night and stopped it after he discussed his improvement with his psychiatrist one month ago. Patient requested to be discharged and provided a safety plan to apply for rule 25 and to ask his mother to do finances for him. He did not seem to be reliable and as his mother explained to team CTC he had already moved his money to another account. Team decided to discharge him since he was not intoxicated, impulsive and  "suicidal anymore. Patient's longest sobriety period was when he was committed and was on risperidone. Therefore he was recommended to take his risperidone and potentially increase it in the future. Motivational interviewing provided and patient seemed to be agreeable with the plan.     At the day of he patient's symptoms of hallucinations, suicidal thoughts, restlessness and sleeplessness improved.     Bryon Alonzo was discharged to home. At the time of discharge Bryon Alonzo was determined to not be a danger to himself or others.Today Bryno Alonzo denied any suicidal idea or plan. 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 However, risk is mitigated by ability to volunteer a safety plan and history of seeking help when needed. Therefore, based on all available evidence including the factors cited above, Bryon Alonzo does not appear to be at imminent risk for self-harm, and is appropriate for outpatient level of care.     This document serves as a transfer of care to Bryon Alonzo's outpatient providers.         Discharge Medications:     Psychiatry related meds:  - Risperidone 1 mg QHS  - Trazodone PRN for sleep  - Folic Acid 1 tablet daily  - Multivitamin 1 tablet daily  - Thiamin 1 tablet daily    Non psych meds:  - ASA tab 81 mg daily  - Albuterol inhaler   - Labetalol tab 200 mg BID  - lisinopril tab 30 mg daily  - zantac          Psychiatric Examination:   /76  Pulse 67  Temp 97.4  F (36.3  C) (Tympanic)  Resp 18  Ht 1.676 m (5' 6\")  SpO2 97%    Appearance: awake, alert, appeared as age stated and neatly groomed  Attitude: cooperative  Eye Contact: Good, fair  Mood: \"fine, feel myself\"  Affect: mood congruent, intensity is normal, constricted mobility and full range  Speech: clear, coherent  Psychomotor Behavior: no " evidence of tardive dyskinesia, dystonia, or tics  Thought Process: logical and goal oriented  Associations: no loose associations  Thought Content: no evidence of suicidal ideation or homicidal ideation  Insight: fair  Judgment: fair  Oriented to: time, person, and place  Attention Span and Concentration: intact  Recent and Remote Memory: intact  Language: Able to name objects, Able to repeat phrases and Able to read and write   Fund of Knowledge: appropriate  Muscle Strength and Tone: normal  Gait and Station: Normal         Discharge Plan:     Dr. Oshea- Monday April 17th 10:30am   [facility]- [address], [address] [place] [address].   Phone: [phone] Fax: [phone]    The patient planned to seek a Rule 25 evaluation ASAP and was agreeable to giving access to his accounts to his mother to prevent him from impulsvie spending or drug use.    Pt seen and discussed with my attending, Dr. Crawford.   Melissa Jimenez MD  PGY1 Psychiatry Resident    Psychiatry Attending Attestation:  This patient has been seen and evaluated by me, Fernando Crawford M.D. The hospital care and discharge plan were formulated in team discussion with the patient, psychiatry resident and/or medical student, the erazo Clinical Treatment Coordinator, and RN. I reviewed, edited and agree with the findings and plan in this discharge summary. Total time on discharge date involved with planning and face-to-face discussion with the patient was 25 minutes.    It was our pleasure and privilege to participate in the care of this patient. Please contact any of the team for any questions about the above information.     Garcia Crawford M.D.   of Psychiatry  [facility]  Email [email or web address]  Phone [phone]

## 2017-04-14 NOTE — PROGRESS NOTES
Slept much of shift. Out for meals, phone call and snack. Pleasant and polite when out.       04/13/17 1900   Behavioral Health   Hallucinations denies / not responding to hallucinations   Orientation person: oriented;place: oriented;date: oriented;time: oriented   Memory baseline memory   Insight poor   Judgement impaired   Eye Contact at examiner   Affect blunted, flat   Mood other (see comments)  (fatigued)   Physical Appearance/Attire attire appropriate to age and situation   Hygiene neglected grooming - unclean body, hair, teeth   Activity isolative;other (see comment)  (Slept much of shift)   Speech clear;coherent   Psychomotor / Gait balanced;steady   Activities of Daily Living   Hygiene/Grooming independent  (Declined)

## 2017-04-17 ENCOUNTER — HOSPITAL ENCOUNTER (INPATIENT)
Facility: CLINIC | Age: 32
LOS: 21 days | Discharge: HOME | DRG: 885 | End: 2017-05-08
Attending: EMERGENCY MEDICINE | Admitting: PSYCHIATRY & NEUROLOGY
Payer: COMMERCIAL

## 2017-04-17 DIAGNOSIS — G47.00 INSOMNIA, UNSPECIFIED TYPE: Primary | ICD-10-CM

## 2017-04-17 DIAGNOSIS — K21.9 GASTROESOPHAGEAL REFLUX DISEASE WITHOUT ESOPHAGITIS: ICD-10-CM

## 2017-04-17 DIAGNOSIS — I51.7 CARDIOMEGALY: ICD-10-CM

## 2017-04-17 DIAGNOSIS — J45.30 MILD PERSISTENT ASTHMA WITHOUT COMPLICATION: ICD-10-CM

## 2017-04-17 DIAGNOSIS — F31.77 BIPOLAR DISORDER, IN PARTIAL REMISSION, MOST RECENT EPISODE MIXED (H): ICD-10-CM

## 2017-04-17 DIAGNOSIS — F14.10 COCAINE ABUSE, CONTINUOUS (H): ICD-10-CM

## 2017-04-17 DIAGNOSIS — I10 ESSENTIAL HYPERTENSION, MALIGNANT: ICD-10-CM

## 2017-04-17 DIAGNOSIS — I24.9 ACS (ACUTE CORONARY SYNDROME) (H): ICD-10-CM

## 2017-04-17 DIAGNOSIS — T78.2XXD ANAPHYLACTIC REACTION, SUBSEQUENT ENCOUNTER: ICD-10-CM

## 2017-04-17 DIAGNOSIS — R45.851 SUICIDAL IDEATION: ICD-10-CM

## 2017-04-17 DIAGNOSIS — F19.20 CHEMICAL DEPENDENCY (H): ICD-10-CM

## 2017-04-17 DIAGNOSIS — F19.90 SUBSTANCE USE DISORDER: ICD-10-CM

## 2017-04-17 DIAGNOSIS — R07.9 CHEST PAIN, UNSPECIFIED: ICD-10-CM

## 2017-04-17 LAB
AMPHETAMINES UR QL SCN: ABNORMAL
ANION GAP SERPL CALCULATED.3IONS-SCNC: 8 MMOL/L (ref 3–14)
BARBITURATES UR QL: ABNORMAL
BASOPHILS # BLD AUTO: 0 10E9/L (ref 0–0.2)
BASOPHILS NFR BLD AUTO: 0.3 %
BENZODIAZ UR QL: ABNORMAL
BUN SERPL-MCNC: 17 MG/DL (ref 7–30)
CALCIUM SERPL-MCNC: 8.6 MG/DL (ref 8.5–10.1)
CANNABINOIDS UR QL SCN: ABNORMAL
CHLORIDE SERPL-SCNC: 100 MMOL/L (ref 94–109)
CO2 SERPL-SCNC: 32 MMOL/L (ref 20–32)
COCAINE UR QL: ABNORMAL
CREAT SERPL-MCNC: 1.76 MG/DL (ref 0.66–1.25)
DIFFERENTIAL METHOD BLD: ABNORMAL
EOSINOPHIL # BLD AUTO: 0.3 10E9/L (ref 0–0.7)
EOSINOPHIL NFR BLD AUTO: 2.9 %
ERYTHROCYTE [DISTWIDTH] IN BLOOD BY AUTOMATED COUNT: 14.4 % (ref 10–15)
ETHANOL UR QL SCN: ABNORMAL
GFR SERPL CREATININE-BSD FRML MDRD: 45 ML/MIN/1.7M2
GLUCOSE SERPL-MCNC: 111 MG/DL (ref 70–99)
HCT VFR BLD AUTO: 39.5 % (ref 40–53)
HGB BLD-MCNC: 13.2 G/DL (ref 13.3–17.7)
IMM GRANULOCYTES # BLD: 0 10E9/L (ref 0–0.4)
IMM GRANULOCYTES NFR BLD: 0.3 %
INTERPRETATION ECG - MUSE: NORMAL
LYMPHOCYTES # BLD AUTO: 3.4 10E9/L (ref 0.8–5.3)
LYMPHOCYTES NFR BLD AUTO: 29 %
MCH RBC QN AUTO: 29.1 PG (ref 26.5–33)
MCHC RBC AUTO-ENTMCNC: 33.4 G/DL (ref 31.5–36.5)
MCV RBC AUTO: 87 FL (ref 78–100)
MONOCYTES # BLD AUTO: 0.7 10E9/L (ref 0–1.3)
MONOCYTES NFR BLD AUTO: 5.7 %
NEUTROPHILS # BLD AUTO: 7.2 10E9/L (ref 1.6–8.3)
NEUTROPHILS NFR BLD AUTO: 61.8 %
NRBC # BLD AUTO: 0 10*3/UL
NRBC BLD AUTO-RTO: 0 /100
OPIATES UR QL SCN: ABNORMAL
PLATELET # BLD AUTO: 455 10E9/L (ref 150–450)
POTASSIUM SERPL-SCNC: 3.3 MMOL/L (ref 3.4–5.3)
RBC # BLD AUTO: 4.54 10E12/L (ref 4.4–5.9)
SODIUM SERPL-SCNC: 140 MMOL/L (ref 133–144)
TROPONIN I SERPL-MCNC: 0.05 UG/L (ref 0–0.04)
WBC # BLD AUTO: 11.6 10E9/L (ref 4–11)

## 2017-04-17 PROCEDURE — 80307 DRUG TEST PRSMV CHEM ANLYZR: CPT | Performed by: EMERGENCY MEDICINE

## 2017-04-17 PROCEDURE — 80048 BASIC METABOLIC PNL TOTAL CA: CPT | Performed by: EMERGENCY MEDICINE

## 2017-04-17 PROCEDURE — 94640 AIRWAY INHALATION TREATMENT: CPT | Performed by: EMERGENCY MEDICINE

## 2017-04-17 PROCEDURE — 85025 COMPLETE CBC W/AUTO DIFF WBC: CPT | Performed by: EMERGENCY MEDICINE

## 2017-04-17 PROCEDURE — 84484 ASSAY OF TROPONIN QUANT: CPT | Performed by: EMERGENCY MEDICINE

## 2017-04-17 PROCEDURE — 80320 DRUG SCREEN QUANTALCOHOLS: CPT | Performed by: EMERGENCY MEDICINE

## 2017-04-17 PROCEDURE — 25000128 H RX IP 250 OP 636: Performed by: EMERGENCY MEDICINE

## 2017-04-17 PROCEDURE — 93005 ELECTROCARDIOGRAM TRACING: CPT | Performed by: EMERGENCY MEDICINE

## 2017-04-17 PROCEDURE — 25000132 ZZH RX MED GY IP 250 OP 250 PS 637: Performed by: EMERGENCY MEDICINE

## 2017-04-17 PROCEDURE — 96374 THER/PROPH/DIAG INJ IV PUSH: CPT | Performed by: EMERGENCY MEDICINE

## 2017-04-17 PROCEDURE — 84443 ASSAY THYROID STIM HORMONE: CPT | Performed by: STUDENT IN AN ORGANIZED HEALTH CARE EDUCATION/TRAINING PROGRAM

## 2017-04-17 PROCEDURE — 12400001 ZZH R&B MH UMMC

## 2017-04-17 PROCEDURE — 25000125 ZZHC RX 250

## 2017-04-17 PROCEDURE — 25000132 ZZH RX MED GY IP 250 OP 250 PS 637: Performed by: STUDENT IN AN ORGANIZED HEALTH CARE EDUCATION/TRAINING PROGRAM

## 2017-04-17 PROCEDURE — 99285 EMERGENCY DEPT VISIT HI MDM: CPT | Mod: 25 | Performed by: EMERGENCY MEDICINE

## 2017-04-17 PROCEDURE — 99284 EMERGENCY DEPT VISIT MOD MDM: CPT | Mod: 25 | Performed by: EMERGENCY MEDICINE

## 2017-04-17 PROCEDURE — 93010 ELECTROCARDIOGRAM REPORT: CPT | Mod: Z6 | Performed by: EMERGENCY MEDICINE

## 2017-04-17 RX ORDER — IPRATROPIUM BROMIDE AND ALBUTEROL SULFATE 2.5; .5 MG/3ML; MG/3ML
SOLUTION RESPIRATORY (INHALATION)
Status: COMPLETED
Start: 2017-04-17 | End: 2017-04-17

## 2017-04-17 RX ORDER — ASPIRIN 81 MG/1
324 TABLET, CHEWABLE ORAL ONCE
Status: COMPLETED | OUTPATIENT
Start: 2017-04-17 | End: 2017-04-17

## 2017-04-17 RX ORDER — LORAZEPAM 2 MG/ML
1 INJECTION INTRAMUSCULAR ONCE
Status: COMPLETED | OUTPATIENT
Start: 2017-04-17 | End: 2017-04-17

## 2017-04-17 RX ORDER — LISINOPRIL 2.5 MG/1
2.5 TABLET ORAL ONCE
Status: COMPLETED | OUTPATIENT
Start: 2017-04-17 | End: 2017-04-17

## 2017-04-17 RX ORDER — IPRATROPIUM BROMIDE AND ALBUTEROL SULFATE 2.5; .5 MG/3ML; MG/3ML
3 SOLUTION RESPIRATORY (INHALATION) ONCE
Status: COMPLETED | OUTPATIENT
Start: 2017-04-17 | End: 2017-04-17

## 2017-04-17 RX ADMIN — ASPIRIN 81 MG CHEWABLE TABLET 324 MG: 81 TABLET CHEWABLE at 05:15

## 2017-04-17 RX ADMIN — LORAZEPAM 1 MG: 2 INJECTION, SOLUTION INTRAMUSCULAR; INTRAVENOUS at 06:11

## 2017-04-17 RX ADMIN — LABETALOL HCL 200 MG: 200 TABLET, FILM COATED ORAL at 00:15

## 2017-04-17 RX ADMIN — IPRATROPIUM BROMIDE AND ALBUTEROL SULFATE 3 ML: 2.5; .5 SOLUTION RESPIRATORY (INHALATION) at 19:34

## 2017-04-17 RX ADMIN — IPRATROPIUM BROMIDE AND ALBUTEROL SULFATE 3 ML: .5; 3 SOLUTION RESPIRATORY (INHALATION) at 19:34

## 2017-04-17 RX ADMIN — LISINOPRIL 2.5 MG: 2.5 TABLET ORAL at 08:45

## 2017-04-17 NOTE — ED NOTES
Pt was asking security for his wallet so he could order take out. Security said no and that she would have to speak with the RN. As soon as security walked out of the room pt pulled out his IV and was standing in the room with blood dripping down his arm. Pressure was applied to the site and a bandage placed. Floor was cleaned. Pt was told that he can have his wallet to order food.

## 2017-04-17 NOTE — ED PROVIDER NOTES
History     Chief Complaint   Patient presents with     Suicidal     plan to overdose,      Addiction Problem     crack, cocaine,      Chest Pain     HPI  Bryon Alonzo is a 32 year old male with a history of bipolar disorder and polysubstance abuse who presents with suicidal thoughts.  He states that he used over $3000 in cocaine in a suicide attempt.  He last used a few hours prior to arrival.  He states that he feels depressed and suicidal, in part, because of his drug use.  He states he has some anterior chest pain after he smoked cocaine.  His chest pain is now resolved.  He denies any radiation with his pain.  He had a normal exercise stress test 3 years ago.  No prior history of DVT or PE.  He has no cough or shortness of breath. No fever or chills.      PAST MEDICAL HISTORY:   Past Medical History:   Diagnosis Date     Asthma      Bipolar disorder (H)      Chronic kidney disease      Hyperlipidemia      Hypertension      LVH (left ventricular hypertrophy) 2015     Polysubstance abuse     Methamphetamine, Cocaine, Alcohol, THC, LSD       PAST SURGICAL HISTORY:   Past Surgical History:   Procedure Laterality Date     NO HISTORY OF SURGERY         FAMILY HISTORY:   Family History   Problem Relation Age of Onset     DIABETES Mother      Hypertension Mother      Coronary Artery Disease Father      KIDNEY DISEASE Father        SOCIAL HISTORY:   Social History   Substance Use Topics     Smoking status: Heavy Tobacco Smoker     Packs/day: 3.00     Smokeless tobacco: Not on file     Alcohol use 0.0 oz/week     0 Standard drinks or equivalent per week      Comment: One pint per day of vodka per day for past five days       Patient's Medications   New Prescriptions    No medications on file   Previous Medications    ALBUTEROL (2.5 MG/3ML) 0.083% NEBULIZER SOLUTION    Take 1 vial (2.5 mg) by nebulization every 6 hours as needed for shortness of breath / dyspnea or wheezing    ALBUTEROL (ALBUTEROL) 108 (90 BASE)  MCG/ACT INHALER    Inhale 2 puffs into the lungs every 4 hours as needed    ASPIRIN 81 MG EC TABLET    Take 1 tablet (81 mg) by mouth daily    CHOLECALCIFEROL 2000 UNITS TABLET    Take 2,000 Units by mouth daily    EPINEPHRINE 0.3 MG/0.3ML INJECTION 2-PACK    Inject 0.3 mLs (0.3 mg) into the muscle once as needed for anaphylaxis    FOLIC ACID (FOLVITE) 1 MG TABLET    Take 1 tablet (1 mg) by mouth daily    LABETALOL (NORMODYNE) 200 MG TABLET    Take 200 mg by mouth    LISINOPRIL (PRINIVIL,ZESTRIL) 2.5 MG TABLET    Take 12 tablets (30 mg) by mouth daily    MULTIVITAMIN, THERAPEUTIC WITH MINERALS (THERA-VIT-M) TABS    Take 1 tablet by mouth daily    RANITIDINE (ZANTAC) 150 MG TABLET    Take 1 tablet (150 mg) by mouth 2 times daily (before meals)    RISPERIDONE (RISPERDAL) 0.25 MG TABLET    Take 1 mg by mouth    TRAZODONE (DESYREL) 50 MG TABLET    Take 2 tablets (100 mg) by mouth At Bedtime   Modified Medications    No medications on file   Discontinued Medications    No medications on file          Allergies   Allergen Reactions     Banana Anaphylaxis       I have reviewed the Medications, Allergies, Past Medical and Surgical History, and Social History in the Epic system.    Review of Systems   10 pt ROS completed and negative except as noted above in HPI      Physical Exam   BP: (!) 186/111  Pulse: 91  Heart Rate: 82  Temp: 98.5  F (36.9  C)  Resp: 16  Weight: 104.3 kg (230 lb)  SpO2: 98 %  Physical Exam   Constitutional: He is oriented to person, place, and time. No distress.   HENT:   Head: Normocephalic and atraumatic.   Mouth/Throat: No oropharyngeal exudate.   Eyes: Conjunctivae are normal. Pupils are equal, round, and reactive to light. Right eye exhibits no discharge. Left eye exhibits no discharge.   Neck: Normal range of motion. Neck supple.   Cardiovascular: Normal rate and intact distal pulses.    Pulmonary/Chest: Effort normal. No respiratory distress. He has no wheezes. He has no rales. He exhibits no  tenderness.   Abdominal: Soft. There is no tenderness. There is no rebound and no guarding.   Musculoskeletal: Normal range of motion. He exhibits no edema or tenderness.   Neurological: He is alert and oriented to person, place, and time. He exhibits normal muscle tone.   Skin: Skin is warm and dry. No rash noted. He is not diaphoretic.   Nursing note and vitals reviewed.      ED Course     ED Course     Procedures                EKG Interpretation:      Interpreted by Anthony Carter  Time reviewed:  455  Symptoms at time of EKG: cp  Rhythm: normal sinus   Rate: normal  Axis: NORMAL  Ectopy: none  Conduction: normal  ST Segments/ T Waves: non specific t wave inversion aVL  Q Waves: none  Comparison to prior: Unchanged    Clinical Impression: normal EKG      Critical Care time:  none               Labs Ordered and Resulted from Time of ED Arrival Up to the Time of Departure from the ED   CBC WITH PLATELETS DIFFERENTIAL - Abnormal; Notable for the following:        Result Value    WBC 11.6 (*)     Hemoglobin 13.2 (*)     Hematocrit 39.5 (*)     Platelet Count 455 (*)     All other components within normal limits   BASIC METABOLIC PANEL - Abnormal; Notable for the following:     Potassium 3.3 (*)     Glucose 111 (*)     Creatinine 1.76 (*)     GFR Estimate 45 (*)     GFR Estimate If Black 54 (*)     All other components within normal limits   TROPONIN I - Abnormal; Notable for the following:     Troponin I ES 0.054 (*)     All other components within normal limits       Assessments & Plan (with Medical Decision Making)   1.  Suicidal ideation  2.  Cocaine abuse  3.  Chest pain    31 yo M with a history of bipolar disorder and cocaine abuse who presents with suicidal ideation and chest pain.  He attempted suicide this weekend with cocaine, using over $3000 of cocaine over that time.  He also complained of chest pain with his cocaine use that is now resolved.  EKG NSR with no ischemic changes.  Troponin 0.054 and  near his recent baseline, and likely mildly elevated due to his CKD.  Chest pain likely related to cocaine.  No further work up at this time.  Patient advised to avoid cocaine.  Aspirin given in the ER.     He will be voluntarily admitted to psychiatry for further evaluation.        I have reviewed the nursing notes.    I have reviewed the findings, diagnosis, plan and need for follow up with the patient.    New Prescriptions    No medications on file       Final diagnoses:   Suicidal ideation       4/17/2017   [facility], [place], EMERGENCY DEPARTMENT     Anthony Carter MD  04/17/17 0727

## 2017-04-17 NOTE — IP AVS SNAPSHOT
[place] [id number]    [place] [address]    Phone:  [phone]                                       After Visit Summary   4/17/2017    Bryon Alonzo    MRN: [id number]           After Visit Summary Signature Page     I have received my discharge instructions, and my questions have been answered. I have discussed any challenges I see with this plan with the nurse or doctor.    ..........................................................................................................................................  Patient/Patient Representative Signature      ..........................................................................................................................................  Patient Representative Print Name and Relationship to Patient    ..................................................               ................................................  Date                                            Time    ..........................................................................................................................................  Reviewed by Signature/Title    ...................................................              ..............................................  Date                                                            Time

## 2017-04-17 NOTE — ED NOTES
Cardiac monitor and frequentt vital signs discontinued with approval from MD.  Resting, denies chest pain.

## 2017-04-17 NOTE — PHARMACY-ADMISSION MEDICATION HISTORY
Admission Medication History status for the 4/17/2017 admission is complete.  See EPIC admission navigator for Prior to Admission medications.    Medication history sources:  patient, Kyaw ()     Medication history source reliability: Good    Medication adherence:  Poor    Changes made to PTA medication list (reason)  Added: none  Deleted: albuterol nebulizer solution PRN  Aspirin 81 mg  Cholecalciferol 2000 units  Folic acid 1 mg  Multivitamin  Trazodone 100 mg HS  Changed: labetalol 200 mg BID (no frequency listed)    Additional medication history information (including reliability of information, actions taken by pharmacist): Patient was poor historian. Doses of medications that patient reports taking (lisinopril, labetalol, risperidone) were confirmed by Kyaw. Patient does not remember the last time he took his medications, but says it was in the last week.    Time spent in this activity: 15 min    Medication history completed by: Payal Dunlap, pharmacy intern    Prior to Admission medications    Medication Sig Last Dose Taking? Auth Provider   LISINOPRIL PO Take 30 mg by mouth daily Past Week at Unknown time Yes Unknown, Entered By History   RISPERIDONE PO Take 1 mg by mouth At Bedtime Past Week at Unknown time Yes Unknown, Entered By History   albuterol (ALBUTEROL) 108 (90 BASE) MCG/ACT Inhaler Inhale 2 puffs into the lungs every 4 hours as needed 4/16/2017 at Unknown time Yes Reported, Patient   labetalol (NORMODYNE) 200 MG tablet Take 200 mg by mouth 2 times daily  Past Week at Unknown time Yes Reported, Patient   EPINEPHrine 0.3 MG/0.3ML injection 2-pack Inject 0.3 mLs (0.3 mg) into the muscle once as needed for anaphylaxis Unknown at Unknown time  Melissa Arriola MD

## 2017-04-17 NOTE — H&P
"    -----------------------------------------------------------------------------------------------------------  Psychiatry History & Physical      Bryon Alonzo MRN# [id number]   Age: 32 year old YOB: 1985     Date of Admission: 2017     Interviewed at 6:32 PM         Contacts:   Per Discharge Summary, 17  Primary Outpatient Psychiatrist: Dr. Pantera Oshea  Primary Physician: None  Therapist: None  Family: Leanne Alonzo [phone]         Chief Concern:   \"I've been using for the past 4 days\"     History is obtained from the patient and EMR         History of Present Illness:   Bryon Alonzo is a 32 year old male with a history of substance use disorder (crack, cocaine),  Bipolar II,  who presents with cocaine and crack, stating \"I wanted to use more and more and didn't care if I  because of it\". He was discharged 17 for a similar hospitalization. The discharge plan three days ago was for Bryon to take a cab to get a Rule 25 assessment. He expressed \" I like cocaine and the way it makes me feel\" and that \"the moment I got in that cab to my Rule 25, I decided 'no'\". Over the past three days he reports of spending $4000 on 4-5oz of crack and cocaine. He states that \"I haven't slept or been sober since the moment I left the hospital\". He also has not taken any of his medications during this time. He decided to return to the hospital today because the \"demon inside me has been wasted to nothing\". He thinks this hospitalization will be different from the last because he is \"ready to be clean and go to treatment\".      Today in the ED he denies suicidal ideation or thoughts of self harm. He endorses fatigue, hunger, hopelessness, and loss of sleep. When asked about mood he said \"I chose not to feel one way or the other\". He stated that \"I don't feel guilty because I make every decision with confidence\". He denies current auditory and visual hallucinations, grandiosity, racing " "thoughts, distractibility, anxiety, anger, or violence. In the ED an EKG was obtained which showed NSR. Utox returned + for cocaine.     Bryon Alonzo's goals of this hospitalization are to \"get it out of my system\" and \"to go to treatment\".          Psychiatric History:   Past Diagnoses: bipolar disorders II, Substance use disorder  Past Hospitalizations: >13 inpatient treatments for similar presentations.  Prior ECT: None  Prior use of Psychotropic Medication: Prozac (dizziness), risperidal (sedation). Lithium avoided due to CKD.   Court Commitment: >2  Past Suicide Attempt: 4  Self-injurious Behavior: None    -Some information obtained from last hospitalization H&P on 4/12/17       Substance Use History:   Primary Drug: Cocaine, crack  -Last CD treatment 9/16 with 6 months sobriety. Last hospitalization for substance use disorder 4/12/17. Last cocaine use \"minutes before coming to the hospital\".     Per 4/12/17 H&P  \"- Started using at age 10- THC and EtOH and then eventually heavy use of PCP followed by heavy use of LSD. Later started using cocaine and methamphetamine which are his current drugs of choice. No hx of IV drug use. Last binge  was prior to admission to Medicine.\"     Denies past or current use of: alcohol, cannabis,  stimulants, opioids, sedatives, hallucinogens, inhalants, nicotine.         Psychiatric Review of Systems:   Depression:   Reports:  changes in sleep, hopelessness, helplessness, passive suicidal ideation.   Denies: depressed mood,  decreased interest,changes in appetite, guilt,impaired concentration, decreased energy, irritability.  Siri:   Endorses: Sleeplessness  Denies:  Impulsiveness, racing thoughts, increased goal-directed activities, pressured speech, grandiose delusions.  Psychosis:   Denies: visual hallucinations, auditory hallucinations, paranoia, grandiosity, ideas of reference, thought insertions, thought broadcasting.  Anxiety:   Denies: worries, nervousness, specific " "phobias.    PTSD:   Denies: re-experiencing past trauma, nightmares, avoidance of traumatic stimuli, impaired function.  OCD:   Denies: obsessions, checking, symmetry, cleaning, skin picking.    ODD:   Denies:  lose temper, argues, defiant, blaming, spiteful, angry, vindictive.        Medical Review of Systems:   Per ED Note:  \"some anterior chest pain that is now resolved. He denies radiation with his pain\".          Past Medical History     Past Medical History:   Diagnosis Date     Asthma      Bipolar disorder (H)      Chronic kidney disease      Hyperlipidemia      Hypertension      LVH (left ventricular hypertrophy)      Polysubstance abuse     Methamphetamine, Cocaine, Alcohol, THC, LSD          Medications:   I have reviewed this patient's current medications and he is able to recall labetalol, lisinopril, aspirin, risperidone, trazodone. He denies taking any of these medications of the last 3 days.          Allergies:     Allergies   Allergen Reactions     Banana Anaphylaxis           Family History:    Multigenerational substance use in family members. Brother  of drug overdose. He reports likely depression also in his family also. No completed suicides in relatives.         Social History   Upbringing: per 17 H&P  \"Born in [facility] and raised in home by single mom. The patient is one of several siblings. One brother  from accident while high on ecstasy. Parents never .\"  Relationships: Has 2 sons who is has custody of for 2 weeks out of the month. Has not seen them since he was last sober.  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   Legal History: None known  Spiritual: \"Im not [employer], but I am spiritual\"         Labs:     Results for orders placed or performed during the hospital encounter of 17 (from the past 24 hour(s))   EKG 12 lead   Result Value Ref Range    Interpretation ECG Click View Image link to " view waveform and result    CBC with platelets differential   Result Value Ref Range    WBC 11.6 (H) 4.0 - 11.0 10e9/L    RBC Count 4.54 4.4 - 5.9 10e12/L    Hemoglobin 13.2 (L) 13.3 - 17.7 g/dL    Hematocrit 39.5 (L) 40.0 - 53.0 %    MCV 87 78 - 100 fl    MCH 29.1 26.5 - 33.0 pg    MCHC 33.4 31.5 - 36.5 g/dL    RDW 14.4 10.0 - 15.0 %    Platelet Count 455 (H) 150 - 450 10e9/L    Diff Method Automated Method     % Neutrophils 61.8 %    % Lymphocytes 29.0 %    % Monocytes 5.7 %    % Eosinophils 2.9 %    % Basophils 0.3 %    % Immature Granulocytes 0.3 %    Nucleated RBCs 0 0 /100    Absolute Neutrophil 7.2 1.6 - 8.3 10e9/L    Absolute Lymphocytes 3.4 0.8 - 5.3 10e9/L    Absolute Monocytes 0.7 0.0 - 1.3 10e9/L    Absolute Eosinophils 0.3 0.0 - 0.7 10e9/L    Absolute Basophils 0.0 0.0 - 0.2 10e9/L    Abs Immature Granulocytes 0.0 0 - 0.4 10e9/L    Absolute Nucleated RBC 0.0    Basic metabolic panel   Result Value Ref Range    Sodium 140 133 - 144 mmol/L    Potassium 3.3 (L) 3.4 - 5.3 mmol/L    Chloride 100 94 - 109 mmol/L    Carbon Dioxide 32 20 - 32 mmol/L    Anion Gap 8 3 - 14 mmol/L    Glucose 111 (H) 70 - 99 mg/dL    Urea Nitrogen 17 7 - 30 mg/dL    Creatinine 1.76 (H) 0.66 - 1.25 mg/dL    GFR Estimate 45 (L) >60 mL/min/1.7m2    GFR Estimate If Black 54 (L) >60 mL/min/1.7m2    Calcium 8.6 8.5 - 10.1 mg/dL   Troponin I   Result Value Ref Range    Troponin I ES 0.054 (H) 0.000 - 0.045 ug/L   Drug abuse screen 6 urine (tox)   Result Value Ref Range    Amphetamine Qual Urine  NEG     Negative   Cutoff for a negative amphetamine is 500 ng/mL or less.      Barbiturates Qual Urine  NEG     Negative   Cutoff for a negative barbiturate is 200 ng/mL or less.      Benzodiazepine Qual Urine  NEG     Negative   Cutoff for a negative benzodiazepine is 200 ng/mL or less.      Cannabinoids Qual Urine  NEG     Negative   Cutoff for a negative cannabinoid is 50 ng/mL or less.      Cocaine Qual Urine (A) NEG     Positive   Cutoff for  a positive cocaine is greater than 300 ng/mL. This is an unconfirmed   screening result to be used for medical purposes only.      Ethanol Qual Urine  NEG     Negative   Cutoff for a negative urine ethanol is 0.05 g/dL or less      Opiates Qualitative Urine  NEG     Negative   Cutoff for a negative opiate is 300 ng/mL or less.              Psychiatric Examination:   /89  Pulse 91  Temp 98.5  F (36.9  C) (Oral)  Resp 19  Wt 104.3 kg (230 lb)  SpO2 98%  BMI 37.12 kg/m2    Appearance:  awake, alert, dressed in hospital scrubs, poorly groomed, fatigued and mild distress  Attitude:  cooperative  Eye Contact:  fair  Mood:  hopeless, anhedonia, apathetic and pretentious  Affect:  mood incongruent and intensity is heightened, frequently smiling and laughing while describing feeling hopeless.   Speech:  dysarthria, loud, exaggerated   Psychomotor Behavior:  no evidence of tardive dyskinesia, dystonia, or tics  Thought Process:  disorganized and circumstantial  Associations:  no loose associations  Thought Content:  passive suicidal ideation present, no auditory hallucinations present and no visual hallucinations present  Insight:  limited  Judgment:  poor  Oriented to:  time, person, and place  Attention Span and Concentration:  fair  Recent and Remote Memory:  fair  Fund of Knowledge: appropriate  Muscle Strength and Tone: normal         Physical Examination:   Please refer to note by, Dr. Anthony Carter MD, dated 4/17/17 for details of physical exam.         Assessment:   Bryon Alonzo is a 32 year old male with a history of substance use disorder (crack, cocaine, meth) and bipolar type II who presented to the ED following crack and cocaine use with suicidal ideation. The patient's last psychiatric hospitalization was in 4/12/17 for similar clinical presentation. He was stabilized in unit and medication was restarted. He was discharged with plan for Rule 25 assessment, which patient did not follow through  with. Patient has not seen a provider since discharge. Family history is notable for substance use. Current psychosocial stressors are not well known other than the patient feeling hopeless about his recent hospitalization.      In the ED, the patient currently reports passive suicidal ideation with no active thoughts of suicide or self harm. UTox positive for cocaine only, consistent with pt's reported use. He was found to have a history of chest pain, now resolved, and hypertension upon admission. EKG NSR. Troponins chronically elevated due to drug-related cardiomyopathy. No signs or symptoms of ortega at this time, however history over last few days difficult to discern due to cocaine use. Pt denies taking any of his medications over the last few days. He currently expresses interest in long-term chemical dependency treatment. Given inability to follow-through with cab ride to Rule 25 assessment during last hospitalization and severity of drug use since discharge, will likely need to be placed on a hold while inpatient.     Plan   Admit to station 22 under the care of Dr. Crawford. To be staffed by Dr. Crawford the AM. Patient is currently voluntary.     Principal Diagnosis:   #Cocaine/crack intoxication  #Bipolar disorder  - Withdrawal precautions  - Hold will likely be placed in AM    Medications:   Restart:   -Hydroxyzine 25-50 PRN for anxiety  -Olanzapine for emergency  Continue:   -Risperidone 1mg QHS  -Trazodone PRN for sleep  -Folic Acid  -Thiamine   -Multivitamin    Laboratory/Imaging:   -Liver Panel  -TSH with free T4    Consults:   -Medicine    Patient will be treated in therapeutic milieu with appropriate individual and group therapies as described.    Medical diagnoses to be addressed this admission:    # h/o non-ischemic cardiomyopathy: Patient reports history of chest pain, now respolved. Troponin I chronically elevated ,  unchanged from base line. EKG NSR.   - ASA tab 81 mg daily     # HTN/CKD:   -  Labetalol tab 200 mg BID  - lisinopril tab 30 mg daily     # asthma  - Albuterol inhaler      #GERD:  - Cont zantac      #hyperlipidemia: results suggestive of hyperlipidemia. Patient was once on Atorvastatin. Unclear why it was stopped.     Consults: Medicine, teresita recs    Legal Status: Voluntary    Safety Assessment:   Checks: Status 15  Precautions: Substance Withdrawal  Pt has not required locked seclusion or restraints in the past 24 hours to maintain safety, please refer to RN documentation for further details.    The risks, benefits, alternatives and side effects have been discussed and are understood by the patient and other caregivers.     Anticipated Disposition/Discharge Date: Unknown at this time. Pending further clinical evaluation and stabilization.    Attestation:  Patient has been seen and evaluated by me,  Melissa Jimenez MD Psychiatry Resident, PGY-1.    Attending Admission Attestation Note:    I personally interviewed and examined Bryon on April 18, 2017, I reviewed the admission history/examination and other documents related to the admission.  I confirmed the findings in the admission note and I agree with the diagnosis and treatment plan with the following corrections, clarifications, additional findings, and assessments: I certify that the treatment plan was reviewed and approved or developed by me in accordance with standard psychiatric practice. I certifiy that the inpatient services were ordered in accordance with the Medicare regulations governing the order. This includes certification that hospital inpatient services are reasonable and necessary and in the case of services not specified as inpatient-only under 42 .22(n), that they are appropriately provided as inpatient services in accordance with the 2-midnight benchmark under 42 .3(e).     The reason for inpatient status is Suicidal Ideation and/or Behavior and Substance Dependence. High degree of  complexity due to multiple hospitalizations, repeated relapse on cocaine, uncertain whether has primary psychiatric disorder.      Fernando Crawford M.D.  of Psychiatry  Pager: [phone]    email: [email or web address]

## 2017-04-17 NOTE — IP AVS SNAPSHOT
MRN:[id number]                      After Visit Summary   4/17/2017    Bryon Alonzo    MRN: [id number]           Thank you!     Thank you for choosing [facility] for your care. Our goal is always to provide you with excellent care.        Patient Information     Date Of Birth          1985        About your hospital stay     You were admitted on:  April 17, 2017 You last received care in the:  [facility]    You were discharged on:  May 8, 2017       Who to Call     For medical emergencies, please call 911.  For non-urgent questions about your medical care, please call your primary care provider or clinic, None          Attending Provider     Provider Specialty    Anthony Carter MD Emergency Medicine    Henrik Jimenez MD Emergency Medicine    Fernando Crawford MD Psychiatry       Primary Care Provider    Physician No Ref-Primary       No address on file        Further instructions from your care team       Behavioral Discharge Planning and Instructions      Summary:  You were admitted on 4/17/2017 for Suicidal Ideations in the context of cocaine use.  You were treated by Dr. Fernando Crawford MD and discharged on 5/8/2017 from [facility]. You have went through the court process and have been placed on a Stay of Commitment in [facility] and will be attending chemically dependency treatment at [facility].       Main Diagnosis: Bipolar Disorder     Health Care Follow-up Appointments:     Dr. Oshea- Monday June 12th 9:30  [place] [address] [facility], [address] [place] [address].   Phone: [phone] Fax: [phone]    Attend all scheduled appointments with your outpatient providers. Call at least 24 hours in advance if you need to reschedule an appointment to ensure continued access to your outpatient providers.   Major Treatments, Procedures and Findings:  You were provided with: assessed for medical stability, medication evaluation and/or management,  "CD evaluation/assessment and milieu management    Symptoms to Report: losing more sleep, mood getting worse or thoughts of suicide    Early warning signs can include: increased depression or anxiety increased thoughts or behaviors of suicide or self-harm  increased unusual thinking, such as paranoia or hearing voices    Safety and Wellness:  Take all medicines as directed.  Make no changes unless your doctor suggests them.      Follow treatment recommendations.  Refrain from alcohol and non-prescribed drugs.  If there is a concern for safety, call 911.    Resources:   Crisis Intervention: 110.961.1376 or 163-761-7361 (TTY: 217.233.2400).  Call anytime for help.  [place] on [place]ness (www.[place].nba.org): 364.481.2788 or 039-060-6790.  Suicide Awareness Voices of Education (SAVE) (www.save.org): 607-660-NCSE (1165)  National Suicide Prevention Line (www.mentalhealthmn.org): 534-060-UNFO (4196)  Mental Health Consumer/Survivor Network of [place] (www.mhcsn.net): 770.511.1917 or 905-576-2925  [facility] Crisis (COPE) Response - Adult 142 042-6041  Crisis text line: Text \"START\" to 109-662. Free, confidential, 24/7.  Crisis Intervention: 889.283.9056 or 832-072-0116. Call anytime for help.   [facility] Crisis Team - Child: [phone]    The treatment team has appreciated the opportunity to work with you.     If you have any questions or concerns our unit number is [phone]  You may be receiving a follow-up phone call within the next three days from a representative from behavioral health.              Pending Results     No orders found from 4/15/2017 to 4/18/2017.            Statement of Approval     Ordered          05/08/17 0920  I have reviewed and agree with all the recommendations and orders detailed in this document.  EFFECTIVE NOW     Approved and electronically signed by:  George Torres MD             Admission Information     Date & Time Provider " Department Dept. Phone    4/17/2017 Fernando Crawford MD  [facility] [phone]      Your Vitals Were     Blood Pressure Pulse Temperature Respirations Weight Pulse Oximetry    132/85 66 97.6  F (36.4  C) 16 106.6 kg (235 lb) 100%    BMI (Body Mass Index)                   37.93 kg/m2           Care EveryWhere ID     This is your Care EveryWhere ID. This could be used by other organizations to access your [facility] medical records  [id number]           Review of your medicines      START taking        Dose / Directions    amLODIPine 10 MG tablet   Commonly known as:  NORVASC   Used for:  Essential hypertension, malignant        Dose:  10 mg   Take 1 tablet (10 mg) by mouth daily Hold for SBP<110.   Quantity:  30 tablet   Refills:  0       aspirin 81 MG chewable tablet   Used for:  ACS (acute coronary syndrome) (H), Essential hypertension, malignant        Dose:  81 mg   Take 1 tablet (81 mg) by mouth daily   Quantity:  30 tablet   Refills:  0       atorvastatin 10 MG tablet   Commonly known as:  LIPITOR   Used for:  Chest pain, unspecified, Essential hypertension, malignant, ACS (acute coronary syndrome) (H)        Dose:  10 mg   Take 1 tablet (10 mg) by mouth daily   Quantity:  30 tablet   Refills:  0       cholecalciferol 2000 UNITS tablet   Used for:  Chemical dependency (H)        Dose:  2000 Units   Take 2,000 Units by mouth daily   Quantity:  30 tablet   Refills:  0       melatonin 3 MG tablet   Used for:  Insomnia, unspecified type        Dose:  6 mg   Take 2 tablets (6 mg) by mouth nightly as needed for sleep   Quantity:  60 tablet   Refills:  0       multivitamin, therapeutic Tabs tablet   Used for:  Substance use disorder        Dose:  1 tablet   Take 1 tablet by mouth daily   Quantity:  30 tablet   Refills:  0       ranitidine 150 MG tablet   Commonly known as:  ZANTAC   Used for:  Gastroesophageal reflux disease without esophagitis        Dose:  150 mg   Take 1 tablet (150 mg) by mouth 2 times daily    Quantity:  60 tablet   Refills:  0       traZODone 50 MG tablet   Commonly known as:  DESYREL   Used for:  Insomnia, unspecified type        Dose:  25 mg   Take 0.5 tablets (25 mg) by mouth nightly as needed for sleep   Quantity:  30 tablet   Refills:  0         CONTINUE these medicines which may have CHANGED, or have new prescriptions. If we are uncertain of the size of tablets/capsules you have at home, strength may be listed as something that might have changed.        Dose / Directions    albuterol 108 (90 BASE) MCG/ACT Inhaler   Commonly known as:  albuterol   This may have changed:  reasons to take this   Used for:  Mild persistent asthma without complication        Dose:  2 puff   Inhale 2 puffs into the lungs every 4 hours as needed for shortness of breath / dyspnea   Quantity:  3.7 Inhaler   Refills:  0       labetalol 200 MG tablet   Commonly known as:  NORMODYNE   This may have changed:  additional instructions   Used for:  Essential hypertension, malignant        Dose:  200 mg   Take 1 tablet (200 mg) by mouth 2 times daily Hold for SBP<110 or HR<55.   Quantity:  60 tablet   Refills:  0       lisinopril 40 MG tablet   Commonly known as:  PRINIVIL/ZESTRIL   This may have changed:    - medication strength  - how much to take   Used for:  Essential hypertension, malignant        Dose:  40 mg   Take 1 tablet (40 mg) by mouth daily   Quantity:  30 tablet   Refills:  0       risperiDONE 1 MG tablet   Commonly known as:  risperDAL   This may have changed:  medication strength   Used for:  Bipolar disorder, in partial remission, most recent episode mixed (H)        Dose:  1 mg   Take 1 tablet (1 mg) by mouth At Bedtime   Quantity:  30 tablet   Refills:  0         CONTINUE these medicines which have NOT CHANGED        Dose / Directions    EPINEPHrine 0.3 MG/0.3ML injection   Used for:  Anaphylactic reaction, subsequent encounter        Dose:  0.3 mg   Inject 0.3 mLs (0.3 mg) into the muscle once as needed for  anaphylaxis   Quantity:  0.6 mL   Refills:  0            Where to get your medicines      These medications were sent to [facility] [place], [place] - [address]  [address] [place] [address], [facility] [address]     Phone:  [phone]     albuterol 108 (90 BASE) MCG/ACT Inhaler    amLODIPine 10 MG tablet    aspirin 81 MG chewable tablet    atorvastatin 10 MG tablet    cholecalciferol 2000 UNITS tablet    EPINEPHrine 0.3 MG/0.3ML injection    labetalol 200 MG tablet    lisinopril 40 MG tablet    melatonin 3 MG tablet    multivitamin, therapeutic Tabs tablet    ranitidine 150 MG tablet    risperiDONE 1 MG tablet    traZODone 50 MG tablet                Protect others around you: Learn how to safely use, store and throw away your medicines at www.disposemymeds.org.             Medication List: This is a list of all your medications and when to take them. Check marks below indicate your daily home schedule. Keep this list as a reference.      Medications           Morning Afternoon Evening Bedtime As Needed    albuterol 108 (90 BASE) MCG/ACT Inhaler   Commonly known as:  albuterol   Inhale 2 puffs into the lungs every 4 hours as needed for shortness of breath / dyspnea   Last time this was given:  2 puffs on 5/6/2017  3:13 PM                                amLODIPine 10 MG tablet   Commonly known as:  NORVASC   Take 1 tablet (10 mg) by mouth daily Hold for SBP<110.   Last time this was given:  10 mg on 5/8/2017  9:59 AM                                aspirin 81 MG chewable tablet   Take 1 tablet (81 mg) by mouth daily   Last time this was given:  81 mg on 5/8/2017  9:58 AM                                atorvastatin 10 MG tablet   Commonly known as:  LIPITOR   Take 1 tablet (10 mg) by mouth daily   Last time this was given:  10 mg on 5/7/2017  8:43 PM                                cholecalciferol 2000 UNITS tablet   Take 2,000 Units by mouth daily   Last time this was given:  2,000 Units on 5/8/2017   9:58 AM                                EPINEPHrine 0.3 MG/0.3ML injection   Inject 0.3 mLs (0.3 mg) into the muscle once as needed for anaphylaxis                                labetalol 200 MG tablet   Commonly known as:  NORMODYNE   Take 1 tablet (200 mg) by mouth 2 times daily Hold for SBP<110 or HR<55.   Last time this was given:  200 mg on 5/8/2017  9:58 AM                                lisinopril 40 MG tablet   Commonly known as:  PRINIVIL/ZESTRIL   Take 1 tablet (40 mg) by mouth daily   Last time this was given:  40 mg on 5/8/2017  9:59 AM                                melatonin 3 MG tablet   Take 2 tablets (6 mg) by mouth nightly as needed for sleep   Last time this was given:  6 mg on 5/7/2017  8:44 PM                                multivitamin, therapeutic Tabs tablet   Take 1 tablet by mouth daily   Last time this was given:  1 tablet on 5/8/2017  9:59 AM                                ranitidine 150 MG tablet   Commonly known as:  ZANTAC   Take 1 tablet (150 mg) by mouth 2 times daily   Last time this was given:  150 mg on 5/8/2017  9:59 AM                                risperiDONE 1 MG tablet   Commonly known as:  risperDAL   Take 1 tablet (1 mg) by mouth At Bedtime   Last time this was given:  1 mg on 5/7/2017  8:43 PM                                traZODone 50 MG tablet   Commonly known as:  DESYREL   Take 0.5 tablets (25 mg) by mouth nightly as needed for sleep   Last time this was given:  25 mg on 5/7/2017  8:44 PM

## 2017-04-18 LAB — TSH SERPL DL<=0.005 MIU/L-ACNC: 0.87 MU/L (ref 0.4–4)

## 2017-04-18 PROCEDURE — 25000132 ZZH RX MED GY IP 250 OP 250 PS 637: Performed by: PHYSICIAN ASSISTANT

## 2017-04-18 PROCEDURE — 99207 ZZC CONSULT E&M CHANGED TO INITIAL LEVEL: CPT | Performed by: PHYSICIAN ASSISTANT

## 2017-04-18 PROCEDURE — 97150 GROUP THERAPEUTIC PROCEDURES: CPT | Mod: GO

## 2017-04-18 PROCEDURE — 25000125 ZZHC RX 250: Performed by: PHYSICIAN ASSISTANT

## 2017-04-18 PROCEDURE — 99222 1ST HOSP IP/OBS MODERATE 55: CPT | Performed by: PHYSICIAN ASSISTANT

## 2017-04-18 PROCEDURE — 25000132 ZZH RX MED GY IP 250 OP 250 PS 637

## 2017-04-18 PROCEDURE — 25000132 ZZH RX MED GY IP 250 OP 250 PS 637: Performed by: PSYCHIATRY & NEUROLOGY

## 2017-04-18 PROCEDURE — 25000132 ZZH RX MED GY IP 250 OP 250 PS 637: Performed by: STUDENT IN AN ORGANIZED HEALTH CARE EDUCATION/TRAINING PROGRAM

## 2017-04-18 PROCEDURE — 40000275 ZZH STATISTIC RCP TIME EA 10 MIN

## 2017-04-18 PROCEDURE — 12400001 ZZH R&B MH UMMC

## 2017-04-18 PROCEDURE — 99223 1ST HOSP IP/OBS HIGH 75: CPT | Mod: GC | Performed by: PSYCHIATRY & NEUROLOGY

## 2017-04-18 PROCEDURE — 94640 AIRWAY INHALATION TREATMENT: CPT

## 2017-04-18 RX ORDER — RISPERIDONE 1 MG/1
1 TABLET ORAL AT BEDTIME
Status: DISCONTINUED | OUTPATIENT
Start: 2017-04-18 | End: 2017-05-08 | Stop reason: HOSPADM

## 2017-04-18 RX ORDER — ALBUTEROL SULFATE 90 UG/1
2 AEROSOL, METERED RESPIRATORY (INHALATION) EVERY 4 HOURS PRN
Status: DISCONTINUED | OUTPATIENT
Start: 2017-04-18 | End: 2017-05-08 | Stop reason: HOSPADM

## 2017-04-18 RX ORDER — ALBUTEROL SULFATE 90 UG/1
2 AEROSOL, METERED RESPIRATORY (INHALATION) 4 TIMES DAILY PRN
Status: DISCONTINUED | OUTPATIENT
Start: 2017-04-18 | End: 2017-04-18

## 2017-04-18 RX ORDER — EPINEPHRINE 0.3 MG/.3ML
0.3 INJECTION SUBCUTANEOUS
Status: DISCONTINUED | OUTPATIENT
Start: 2017-04-18 | End: 2017-05-08 | Stop reason: HOSPADM

## 2017-04-18 RX ORDER — ACETAMINOPHEN 325 MG/1
325 TABLET ORAL EVERY 4 HOURS PRN
Status: DISCONTINUED | OUTPATIENT
Start: 2017-04-18 | End: 2017-04-19

## 2017-04-18 RX ORDER — POTASSIUM CHLORIDE 1.5 G/1.58G
20-40 POWDER, FOR SOLUTION ORAL
Status: DISCONTINUED | OUTPATIENT
Start: 2017-04-18 | End: 2017-05-08 | Stop reason: HOSPADM

## 2017-04-18 RX ORDER — RISPERIDONE 1 MG/1
1 TABLET ORAL AT BEDTIME
Status: DISCONTINUED | OUTPATIENT
Start: 2017-04-18 | End: 2017-04-18

## 2017-04-18 RX ORDER — LABETALOL 100 MG/1
100 TABLET, FILM COATED ORAL EVERY 12 HOURS SCHEDULED
Status: DISCONTINUED | OUTPATIENT
Start: 2017-04-18 | End: 2017-04-18

## 2017-04-18 RX ORDER — LABETALOL 200 MG/1
200 TABLET, FILM COATED ORAL 2 TIMES DAILY
Status: DISCONTINUED | OUTPATIENT
Start: 2017-04-18 | End: 2017-05-08 | Stop reason: HOSPADM

## 2017-04-18 RX ORDER — POTASSIUM CHLORIDE 1500 MG/1
20-40 TABLET, EXTENDED RELEASE ORAL
Status: DISCONTINUED | OUTPATIENT
Start: 2017-04-18 | End: 2017-05-08 | Stop reason: HOSPADM

## 2017-04-18 RX ORDER — IPRATROPIUM BROMIDE AND ALBUTEROL SULFATE 2.5; .5 MG/3ML; MG/3ML
3 SOLUTION RESPIRATORY (INHALATION) ONCE
Status: COMPLETED | OUTPATIENT
Start: 2017-04-18 | End: 2017-04-18

## 2017-04-18 RX ORDER — ALBUTEROL SULFATE 0.83 MG/ML
2.5 SOLUTION RESPIRATORY (INHALATION) EVERY 6 HOURS PRN
Status: DISCONTINUED | OUTPATIENT
Start: 2017-04-18 | End: 2017-05-08 | Stop reason: HOSPADM

## 2017-04-18 RX ORDER — POTASSIUM CHLORIDE 1.5 G/1.58G
40 POWDER, FOR SOLUTION ORAL ONCE
Status: COMPLETED | OUTPATIENT
Start: 2017-04-18 | End: 2017-04-18

## 2017-04-18 RX ORDER — ASPIRIN 81 MG/1
81 TABLET, CHEWABLE ORAL DAILY
Status: DISCONTINUED | OUTPATIENT
Start: 2017-04-18 | End: 2017-05-08 | Stop reason: HOSPADM

## 2017-04-18 RX ORDER — HYDROXYZINE HYDROCHLORIDE 25 MG/1
25-50 TABLET, FILM COATED ORAL EVERY 4 HOURS PRN
Status: DISCONTINUED | OUTPATIENT
Start: 2017-04-18 | End: 2017-05-08 | Stop reason: HOSPADM

## 2017-04-18 RX ADMIN — RANITIDINE 150 MG: 150 TABLET ORAL at 01:45

## 2017-04-18 RX ADMIN — POTASSIUM CHLORIDE 40 MEQ: 1.5 POWDER, FOR SOLUTION ORAL at 13:17

## 2017-04-18 RX ADMIN — RANITIDINE HYDROCHLORIDE 150 MG: 150 TABLET, FILM COATED ORAL at 01:45

## 2017-04-18 RX ADMIN — IPRATROPIUM BROMIDE AND ALBUTEROL SULFATE 3 ML: .5; 3 SOLUTION RESPIRATORY (INHALATION) at 11:41

## 2017-04-18 RX ADMIN — RISPERIDONE 1 MG: 1 TABLET ORAL at 20:59

## 2017-04-18 RX ADMIN — Medication 25 MG: at 21:00

## 2017-04-18 RX ADMIN — RISPERIDONE 1 MG: 1 TABLET ORAL at 00:15

## 2017-04-18 RX ADMIN — LABETALOL HCL 200 MG: 200 TABLET, FILM COATED ORAL at 11:14

## 2017-04-18 RX ADMIN — ASPIRIN 81 MG CHEWABLE TABLET 81 MG: 81 TABLET CHEWABLE at 11:14

## 2017-04-18 RX ADMIN — RANITIDINE HYDROCHLORIDE 150 MG: 150 TABLET, FILM COATED ORAL at 11:15

## 2017-04-18 RX ADMIN — LISINOPRIL 30 MG: 20 TABLET ORAL at 00:15

## 2017-04-18 RX ADMIN — LISINOPRIL 30 MG: 20 TABLET ORAL at 11:14

## 2017-04-18 RX ADMIN — ALBUTEROL SULFATE 2 PUFF: 90 AEROSOL, METERED RESPIRATORY (INHALATION) at 21:00

## 2017-04-18 RX ADMIN — RANITIDINE HYDROCHLORIDE 150 MG: 150 TABLET, FILM COATED ORAL at 17:51

## 2017-04-18 RX ADMIN — LABETALOL HCL 200 MG: 200 TABLET, FILM COATED ORAL at 20:59

## 2017-04-18 RX ADMIN — Medication 25 MG: at 00:15

## 2017-04-18 RX ADMIN — ALBUTEROL SULFATE 2 PUFF: 90 AEROSOL, METERED RESPIRATORY (INHALATION) at 07:57

## 2017-04-18 RX ADMIN — ACETAMINOPHEN 325 MG: 325 TABLET, FILM COATED ORAL at 16:47

## 2017-04-18 ASSESSMENT — ACTIVITIES OF DAILY LIVING (ADL)
DRESS: SCRUBS (BEHAVIORAL HEALTH);INDEPENDENT
LAUNDRY: WITH SUPERVISION
GROOMING: INDEPENDENT
GROOMING: HANDWASHING;SHOWER;INDEPENDENT
ORAL_HYGIENE: INDEPENDENT
DRESS: INDEPENDENT
ORAL_HYGIENE: INDEPENDENT

## 2017-04-18 NOTE — PLAN OF CARE
Problem: General Plan of Care (Inpatient Behavioral)  Goal: Team Discussion  Team Plan:   BEHAVIORAL TEAM DISCUSSION     Continued Stay Criteria/Rationale: New admission due to suicidal ideation   Plan: Continue to monitor patient's symptoms as a medication regiment is initiated and document changes until proper discharge is set in place.   Participants: Zuleika KIM,   Cherelle Mabry RN,  Dr. Fernando Crawford MD, Dr. Melissa Jimenez MD  Summary/Recommendation: Patient will complete a Rule 25 assessment to referred to an appropriate treatment facility.   Medical/Physical: Hypertension   Progress: Initiated

## 2017-04-18 NOTE — PROGRESS NOTES
"    ----------------------------------------------------------------------------------------------------------  [facility], [place]   Psychiatric Progress Note     Assessment    Presentation: Bryon Alonzo is a 32 year old male with a history of substance use disorder (crack, cocaine), Bipolar II, who presents with cocaine and crack, stating \"I wanted to use more and more and didn't care if I  because of it\". He was discharged 17 for a similar hospitalization. The discharge plan three days ago was for Bryon to take a cab to get a Rule 25 assessment. He expressed \" I like cocaine and the way it makes me feel\" and that \"the moment I got in that cab to my Rule 25, I decided 'no'\". Over the past three days he reports of spending $4000 on 4-5oz of crack and cocaine. He states that \"I haven't slept or been sober since the moment I left the hospital\". He also has not taken any of his medications during this time. He decided to return to the hospital today because the \"demon inside me has been wasted to nothing\". He thinks this hospitalization will be different from the last because he is \"ready to be clean and go to treatment\".    Diagnostic Impression: Patient has a long history of cocaine /crack use disorder. He gets suicidal while on cocaine and after a brief hospitalization and stabilization , restarts the use. Patient is diagnosed with cocaine use disorder. Diagnosis of bipolar II is questionable and the impact of possible Bipolar disorder on his current clinical picture is not known.      Hospital course: Bryon Alonzo was admitted to [facility]  as a voluntary patient and PTA meds restarted. He stated that he never took the medications after discharge. He agreed with team on petitioning for commitment and expressed his wish on getting into a  treatment facility.     Medical course Patient with history of HTN, was hypertensive at unit and the BP readings are " downtrending. He also initially expressed some chest pain at ED when the EKG and Troponin were obtained and were unchanged from past. At the unit patient did not report any chest pain He had some shortness of breath for which he was given albuterol (helped).    Plan     Principal Diagnosis:   #Cocaine/crack intoxication  #Bipolar disorder  - Withdrawal precautions  - start petitioning process     Medications:   Continue:   -Risperidone 1mg QHS  -Hydroxyzine 25-50 PRN for anxiety  -Olanzapine for emergency  -Trazodone PRN for sleep  -Folic Acid  -Thiamine   -Multivitamin     Laboratory/Imaging: Admission labs were unremarkable/unchanged from past.      Consults:   None     Patient will be treated in therapeutic milieu with appropriate individual and group therapies as described.     Medical diagnoses to be addressed this admission:    # h/o non-ischemic cardiomyopathy: Patient reports history of chest pain, now respolved. Troponin I chronically elevated , unchanged from base line. EKG NSR.   - ASA tab 81 mg daily      # HTN/CKD:   - Labetalol tab 200 mg BID  - lisinopril tab 30 mg daily      # asthma  - Albuterol inhaler       #GERD:  - Cont zantac       #hyperlipidemia: results suggestive of hyperlipidemia. Patient was once on Atorvastatin. Unclear why it was stopped.      Consults: Medicine, appreciate recs     Legal Status: Voluntary     Safety Assessment:   Checks: Status 15  Precautions: Substance Withdrawal  Pt has not required locked seclusion or restraints in the past 24 hours to maintain safety, please refer to RN documentation for further details.    The risks, benefits, alternatives and side effects have been discussed and are understood by the patient and other caregivers.     Anticipated Disposition/Discharge Date: Unknown at this time. Pending further clinical evaluation and stabilization.    Pt seen and discussed with my attending, Dr. Crawford.    Melissa Jimenez MD  PGY1 Psychiatry  "Resident  Pager: [phone]    Psychiatry Attending Attestation:  This patient has been seen and evaluated by me, Fernando Crawford M.D. The patient's condition and treatment plan were discussed with the resident, and care coordinated with the CTC and RN. I reviewed, edited and agree with the findings and plan in this note.   Fernando Crawford M.D.   of Psychiatry     Interim History:   The patient's care was discussed with the treatment team and chart notes were reviewed.    Sleep  not recorded: Hypertensive at 150s/80s, with orthostatic changes, otherwise normal and stable: No medication started and PRN only albuterol this amnote: reviewed.     Today during the interview with the treatment team ,he was argumentative and confrontational. He repeated himself that he chose to use cocaine and now he is the one who chooses committment and there is no regret for what had happened in the past. He stated that he is ready to move on with his life and for that he needs CD treatment. He was proud to stay sober for 7 months before the past  two weeks and mentioned that he is planning for same. Team used motivational interviewing to encourage patient to avoid cocaine. After whole discussion, he appreciated team for supporting him on his recovery and \"caring for him genuinely\".     Review of systems:     The Review of Systems is negative other than noted above         Medications:     Current Facility-Administered Medications   Medication     albuterol (PROAIR HFA/PROVENTIL HFA/VENTOLIN HFA) Inhaler 2 puff     EPINEPHrine injection 0.3 mg     labetalol (NORMODYNE) tablet 200 mg     lisinopril (PRINIVIL/ZESTRIL) tablet 30 mg     risperiDONE (risperDAL) tablet 1 mg     hydrOXYzine (ATARAX) tablet 25-50 mg     traZODone (DESYREL) half-tab 25 mg     ranitidine (ZANTAC) tablet 150 mg     potassium chloride SA (K-DUR/KLOR-CON M) CR tablet 20-40 mEq     potassium chloride (KLOR-CON) Packet 20-40 mEq     aspirin " chewable tablet 81 mg     albuterol neb solution 2.5 mg     acetaminophen (TYLENOL) tablet 325 mg             Allergies:     Allergies   Allergen Reactions     Banana Anaphylaxis            Psychiatric Examination:   BP (!) 194/106  Pulse 74  Temp 97.7  F (36.5  C) (Tympanic)  Resp 16  Wt 103.4 kg (228 lb)  SpO2 100%  BMI 36.8 kg/m2  Weight is 228 lbs 0 oz  Body mass index is 36.8 kg/(m^2).    Appearance:  awake, alert, adequately groomed and dressed in hospital scrubs  Attitude:  guarded and somewhat cooperative  Eye Contact:  fair  Mood:  better  Affect:  appropriate and in normal range  Speech:  clear, coherent  Psychomotor Behavior:  no evidence of tardive dyskinesia, dystonia, or tics  Thought Process:  linear and goal oriented, sometimes illogical (when he says that he never believes in using cocaine is wrong, its what I choose in that time)  Associations:  no loose associations  Thought Content:  no evidence of suicidal ideation or homicidal ideation, no evidence of psychotic thought, no auditory hallucinations present and no visual hallucinations present  Insight:  good  Judgment:  limited  Oriented to:  time, person, and place  Attention Span and Concentration:  intact  Recent and Remote Memory:  intact  Language: Able to name objects, Able to repeat phrases and Able to read and write  Fund of Knowledge: appropriate  Muscle Strength and Tone: normal  Gait and Station: Normal         Labs:   No results found for this or any previous visit (from the past 24 hour(s)).    Antipsychotic Labs:  Recent Labs   Lab Test  04/13/17   0723  08/19/16   0718  05/25/16   0733   CHOL  213*  274*  177   TRIG  179*  181*  151*   LDL  102*  183*  98   HDL  75  55  49     Recent Labs   Lab Test  04/17/17   0501  04/12/17   0414  09/21/16   0711   GLC  111*  132*  80     Recent Labs   Lab Test  04/17/17   0501  04/12/17   0414  09/05/16   0926   WBC  11.6*  11.3*  8.8   ANEU  7.2  9.0*  5.9   HGB  13.2*  13.4  12.9*   PLT   942* [phone]

## 2017-04-18 NOTE — PROGRESS NOTES
Initially seen by OT on this date. Bryon attended OT clinic this a.m., did not actively participate but socialized with peers about the music. Expressed enthusiasm about future groups. Will be given a written self-assessment upon increased group attendance. More observation needed to complete initial evaluation at this time.

## 2017-04-18 NOTE — CONSULTS
Internal Medicine Initial Visit    Bryon Alonzo MRN# [id number]   Age: 32 year old YOB: 1985   Date of Admission: 4/17/2017     Reason for consult: SOB, HTN       Requesting physician Dr. Borjas      SUBJECTIVE   HPI:   Bryon Alonzo is a 32 year old male with history of morbid obesity, atypical chest pain, HTN, GERD, mild persistent asthma, bipolar disorder and cocaine abuse admitted to [facility] for suicidal ideation and cocaine use.    Patient currently reports occasional shortness of breath. He thinks it is a combination of asthma and anxiety that has provoked these episodes. He denies associated chest discomfort or palpitations. He is not short of breath currently. He denies wheezing. He reports that he has binged on cocaine and alcohol since d/c from here on Friday.    Denies HA, nausea, fever, chills, abdominal pain, chest pain, wheezing, constipation, diarrhea, urinary symptoms, and rashes.     Past Medical History:     Past Medical History:   Diagnosis Date     Asthma      Bipolar disorder (H)      Chronic kidney disease      Hyperlipidemia      Hypertension      LVH (left ventricular hypertrophy) 2015     Polysubstance abuse     Methamphetamine, Cocaine, Alcohol, THC, LSD      Reviewed & updated in "[employer]     Past Surgical History:      Past Surgical History:   Procedure Laterality Date     NO HISTORY OF SURGERY        Reviewed & updated in Epic.     Medications prior to admission:     Prior to Admission Medications   Prescriptions Last Dose Informant Patient Reported? Taking?   EPINEPHrine 0.3 MG/0.3ML injection 2-pack Unknown at Unknown time  No No   Sig: Inject 0.3 mLs (0.3 mg) into the muscle once as needed for anaphylaxis   LISINOPRIL PO Past Week at Unknown time  Yes Yes   Sig: Take 30 mg by mouth daily   RISPERIDONE PO Past Week at Unknown time  Yes Yes   Sig: Take 1 mg by mouth At Bedtime   albuterol (ALBUTEROL) 108 (90 BASE) MCG/ACT Inhaler 4/16/2017 at Unknown time  Yes Yes  "  Sig: Inhale 2 puffs into the lungs every 4 hours as needed   labetalol (NORMODYNE) 200 MG tablet Past Week at Unknown time  Yes Yes   Sig: Take 200 mg by mouth 2 times daily       Facility-Administered Medications: None         Allergies:     Allergies   Allergen Reactions     Banana Anaphylaxis         Social History:   Tobacco Use: Current smoker  Alcohol Use: Reports drinking \"lots of beer and liquor\"  Illicit Drug Use: Cocaine  STI Testing: Pending     Family History:     Family History   Problem Relation Age of Onset     DIABETES Mother      Hypertension Mother      Coronary Artery Disease Father      KIDNEY DISEASE Father        Reviewed & updated in Epic.     Review of Systems:   Ten point review of systems negative except as stated above in History of Present Illness.    OBJECTIVE   Physical Exam:   Blood pressure (!) 194/106, recheck 150/70, pulse 74, temperature 97.7  F (36.5  C), temperature source Tympanic, resp. rate 16, weight 103.4 kg (228 lb), SpO2 100 %.  General: Alert, awake, NAD.  HEENT: NC/AT. Anicteric sclera. Eyes symmetric and free of discharge bilaterally. Mucous membranes moist.  Cardiovascular: RRR, S1S2, no murmurs appreciated.  Lungs: Normal respiratory effort on RA. Lungs CTAB without wheezes, rales or rhonchi.  Abdomen: Soft, non-tender and nondistended with normoactive bowel sounds.  Extremities: Warm & well perfused. Trace edema in b/l LE.  Skin: No rashes, jaundice, or lesions.  Neurologic: A&O X 3. Answers questions appropriately. Moves all extremities symmetrically.     Laboratory Data:   CMP  Recent Labs  Lab 04/17/17  0501 04/12/17  0414    138   POTASSIUM 3.3* 3.4   CHLORIDE 100 102   CO2 32 25   ANIONGAP 8 10   * 132*   BUN 17 16   CR 1.76* 1.50*   GFRESTIMATED 45* 54*   GFRESTBLACK 54* 65   KATY 8.6 8.3*       CBC  Recent Labs  Lab 04/17/17  0501 04/12/17  0414   WBC 11.6* 11.3*   RBC 4.54 4.58   HGB 13.2* 13.4   HCT 39.5* 39.3*   MCV 87 86   MCH 29.1 29.3 "   [facility] 33.4 34.1   RDW 14.4 14.5   * 395       TSHNo lab results found in last 7 days.       Assessment and Plan/Recommendations:     Bryon Alonzo is a 32 year old male with history of morbid obesity, atypical chest pain, HTN, GERD, mild persistent asthma, bipolar disorder and cocaine abuse admitted to [facility] for suicidal ideation and cocaine use.    Depression, Bipolar Disorder, Suicidal Ideation. Management per Psychiatry.    Polysubstance Abuse. U tox positive for cocaine. Hep B, C, and HIV non reactive on4/14/17. Denies recent IVDU.    Elevated Trop in setting of cocaine use. EKG 4/17 with non specific T wave abnormalities, unchanged from previous EKG. Trop 0.054 on 4/17, stable from 4/12 (0.057 and 0.052). Nl stress test 3 years ago. Given patient is asymptomatic without chest pain and palitations, will not recheck Trop level.     HTN, LVH. Echo 01/2016 with EF of 70%, concentric LVH (likely 2/2 HTN). Pt reports he was taking Labetolol once daily, but upon chart review, patient should be on BID dosing. Currently BPs ~150/85 with elevation to 194/106 last night. Pt was noncompliant with medications, likely the cause of elevated BP.  - Continue PTA lisinopril 30mg qday   - Continue PTA Labetolol 200mg BID   - Previously on amlodipine, consider restarting if BP persistently elevated  - Goal BP <130/90 given CKD    CKD Stage II. Likely 2/2 HTN and noncompliance in setting of substance abuse. Baseline Cr ~1.5 with GFR of 65. Currently Cr 1.76. Mild increase from baseline as a result of alcohol use and poor oral intake this week. [place] [facility]   - Avoid nephrotoxic agents   - Check Cr tomorrow  - Follow up with outpatient nephrologist as scheduled    Morbid obesity: Follows with bariatrics and nutrition team and [facility], considering gastric sleeve. Needs to be sober x1 year prior to surgery.  -Continue to follow up as OP w/ bariatric surgery and nutrition    Dyslipidemia: Lipid panel  w/ mildly elevated T chol at 213 (274), LDL of 102 (181), HDL 75 (55),  (181), much improved from lipid panel in 08/2016 w/o medication management.   -FU w/ PCP for consideration of statin or further medications on discharge  -Encouraged to continue lifestyle modifications and provided encouragement    GERD: Endorses stable symptoms. Continue Zantac BID.    Asthma, mild persistent: On PRN albuterol PTA. Reported mild shortness of breath this AM 2/2 anxiety. Not associated with chest pain or wheezing. Lungs CTAB.   - Continue PRN Albuterol    Hypokalemia. K 3.3 on 4/17. Replaced 4/18 with 40meq. Recheck K ordered.       Medicine will follow BPs. Please page the Internal Medicine job code pager for any intercurrent medical issues which arise. Thank you for the opportunity to be a part of this patient's care.    Malcolm Dodd PA-C  Hospitalist Service  [phone]

## 2017-04-18 NOTE — PROGRESS NOTES
"Initial Psychosocial Assessment     I have reviewed the chart, met with the patient, and developed Care Plan. Information for assessment was obtained from:      Patient: Interviewed and Chart: Reviewed     Presenting Problem:  Bryon Alonzo is a 32 year old male with a significant past psychiatric history of  , bipolar disorder, polysubstance use disorder (Cocaine/Meth), who presented to [facility] ER with due to suicidality after relapsing on cocaine.     History of Mental Health and Chemical Dependency:  Inpatient treatment: >12 inpatient treatments, most recent on [address] [facility] in June. Has had multiple at [facility] and was committed while living in [place]  twice following a manic episode.  - Suicide attempts: This is 4th time for pt     Family Description (Const[place], Family Psychiatric History):  - Substance use: Multigenerational use of alcohol and substances in the family.  - No completed suicides in relatives.     Significant Life Events (Illness, Abuse, Trauma, Death):  None stated     Living Situation:  Lives with mother      Educational Background:  High School grad     Occupational History:  Currently unemployed has a history of working as a       Financial Status:  [place]     Legal Issues:  Recently released from a Stay of Commitment      Ethnic/Cultural Issues:  American      Spiritual Orientation:  \"I'm not Restorationist, but I am spiritual.\"      Service History:  None     Social Functioning (organization, interests):  Had recently been working but relapsed and does not engage with others while using.     Current Treatment Providers are:  Dr. Oshea [facility]- [address] [place] [address], [address] [place] [address]. Phone: [phone] Fax: [phone]     Social Service Assessment/Plan:  Patient has been admitted for psychiatric stabilization for this acute crisis. Patient will meet with treatment team including a psychiatrist to have psychiatric " assessment and medication management. Team will coordinate with the any outpatient medication providers to review and adjust medications as appropriate. Staff will provide therapeutic programming and structure to help maintain a safe environment for healing. Staff will continue to assess patient as needed. Patient will participate in unit groups and activities. Patient will receive individual and group support on the unit. CTC will coordinate with outpatient providers and will place referrals to ensure appropriate follow up care is in place. Patient will require a Rule 25 assessment to help inform treatment needs.

## 2017-04-18 NOTE — PROGRESS NOTES
"   04/18/17 1300   Behavioral Health   Hallucinations denies / not responding to hallucinations   Thinking poor concentration   Orientation person: oriented;place: oriented;date: oriented;time: oriented   Memory baseline memory   Insight admits / accepts   Judgement impaired   Eye Contact at examiner   Affect blunted, flat   Mood mood is calm   Physical Appearance/Attire attire appropriate to age and situation   Hygiene (adequate)   Suicidality (denies)   Self Injury (denies)   Activity isolative;withdrawn  (resting)   Speech clear;coherent   Medication Sensitivity no stated side effects;no observed side effects   Psychomotor / Gait balanced;steady   Psycho Education   Type of Intervention 1:1 intervention   Response participates, initiates socially appropriate   Hours 0.5   Treatment Detail check in   Activities of Daily Living   Hygiene/Grooming independent   Oral Hygiene independent   Dress independent   Room Organization independent   Pt is isolative and withdrawn to room.  Pt denies SI/SIB, no medication side effects reported.  Pt was talking to other [facility] in and the [facility] and stated \"I was just on a 4 day, $5,000 miranda and I plan on sleeping all day.\"  Pt also stated in lounge \"I'm waiting to go to treatment but I need to go directly from hospital to treatment otherwise I'll use.\"  During check in patient stated that he plans to sleep and eat, did not attend groups or community meeting.    "

## 2017-04-19 LAB
CREAT SERPL-MCNC: 1.55 MG/DL (ref 0.66–1.25)
GFR SERPL CREATININE-BSD FRML MDRD: 52 ML/MIN/1.7M2
MAGNESIUM SERPL-MCNC: 2.4 MG/DL (ref 1.6–2.3)
POTASSIUM SERPL-SCNC: 4 MMOL/L (ref 3.4–5.3)

## 2017-04-19 PROCEDURE — 25000132 ZZH RX MED GY IP 250 OP 250 PS 637: Performed by: PSYCHIATRY & NEUROLOGY

## 2017-04-19 PROCEDURE — 25000132 ZZH RX MED GY IP 250 OP 250 PS 637: Performed by: PHYSICIAN ASSISTANT

## 2017-04-19 PROCEDURE — 87491 CHLMYD TRACH DNA AMP PROBE: CPT | Performed by: PHYSICIAN ASSISTANT

## 2017-04-19 PROCEDURE — 99232 SBSQ HOSP IP/OBS MODERATE 35: CPT | Performed by: PSYCHIATRY & NEUROLOGY

## 2017-04-19 PROCEDURE — 97150 GROUP THERAPEUTIC PROCEDURES: CPT | Mod: GO

## 2017-04-19 PROCEDURE — 36415 COLL VENOUS BLD VENIPUNCTURE: CPT | Performed by: PHYSICIAN ASSISTANT

## 2017-04-19 PROCEDURE — 12400001 ZZH R&B MH UMMC

## 2017-04-19 PROCEDURE — 25000132 ZZH RX MED GY IP 250 OP 250 PS 637: Performed by: STUDENT IN AN ORGANIZED HEALTH CARE EDUCATION/TRAINING PROGRAM

## 2017-04-19 PROCEDURE — 83735 ASSAY OF MAGNESIUM: CPT | Performed by: PHYSICIAN ASSISTANT

## 2017-04-19 PROCEDURE — 84132 ASSAY OF SERUM POTASSIUM: CPT | Performed by: PHYSICIAN ASSISTANT

## 2017-04-19 PROCEDURE — 82565 ASSAY OF CREATININE: CPT | Performed by: PHYSICIAN ASSISTANT

## 2017-04-19 PROCEDURE — 90853 GROUP PSYCHOTHERAPY: CPT

## 2017-04-19 PROCEDURE — 87591 N.GONORRHOEAE DNA AMP PROB: CPT | Performed by: PHYSICIAN ASSISTANT

## 2017-04-19 RX ORDER — ATORVASTATIN CALCIUM 10 MG/1
10 TABLET, FILM COATED ORAL DAILY
Status: DISCONTINUED | OUTPATIENT
Start: 2017-04-19 | End: 2017-05-08 | Stop reason: HOSPADM

## 2017-04-19 RX ORDER — ACETAMINOPHEN 325 MG/1
650 TABLET ORAL EVERY 4 HOURS PRN
Status: DISCONTINUED | OUTPATIENT
Start: 2017-04-19 | End: 2017-05-08 | Stop reason: HOSPADM

## 2017-04-19 RX ADMIN — ALBUTEROL SULFATE 2 PUFF: 90 AEROSOL, METERED RESPIRATORY (INHALATION) at 01:12

## 2017-04-19 RX ADMIN — LABETALOL HCL 200 MG: 200 TABLET, FILM COATED ORAL at 10:12

## 2017-04-19 RX ADMIN — Medication 25 MG: at 20:17

## 2017-04-19 RX ADMIN — ASPIRIN 81 MG CHEWABLE TABLET 81 MG: 81 TABLET CHEWABLE at 10:12

## 2017-04-19 RX ADMIN — ATORVASTATIN CALCIUM 10 MG: 10 TABLET, FILM COATED ORAL at 20:17

## 2017-04-19 RX ADMIN — RANITIDINE HYDROCHLORIDE 150 MG: 150 TABLET, FILM COATED ORAL at 18:58

## 2017-04-19 RX ADMIN — RANITIDINE HYDROCHLORIDE 150 MG: 150 TABLET, FILM COATED ORAL at 07:39

## 2017-04-19 RX ADMIN — LABETALOL HCL 200 MG: 200 TABLET, FILM COATED ORAL at 20:16

## 2017-04-19 RX ADMIN — LISINOPRIL 30 MG: 20 TABLET ORAL at 10:13

## 2017-04-19 RX ADMIN — RISPERIDONE 1 MG: 1 TABLET ORAL at 20:17

## 2017-04-19 ASSESSMENT — ACTIVITIES OF DAILY LIVING (ADL)
HYGIENE/GROOMING: INDEPENDENT
LAUNDRY: WITH SUPERVISION
GROOMING: INDEPENDENT
ORAL_HYGIENE: INDEPENDENT
ORAL_HYGIENE: INDEPENDENT
DRESS: SCRUBS (BEHAVIORAL HEALTH);INDEPENDENT
DRESS: INDEPENDENT

## 2017-04-19 NOTE — PROGRESS NOTES
Slept til 1100. Went to am OT. Ate lunch. Social with peers. Active particip in pm OT group.  Bright affect.       04/19/17 1100   Behavioral Health   Hallucinations denies / not responding to hallucinations   Thinking poor concentration   Orientation person: oriented;place: oriented   Memory baseline memory   Insight admits / accepts   Judgement impaired   Eye Contact at examiner   Affect blunted, flat   Mood mood is calm   Physical Appearance/Attire attire appropriate to age and situation   Hygiene well groomed   Activity other (see comment)  (Sleeping much of shift)   Speech clear;coherent   Psychomotor / Gait balanced;steady   Psycho Education   Type of Intervention structured groups   Response refuses   Activities of Daily Living   Hygiene/Grooming independent   Oral Hygiene independent   Dress independent   Laundry with supervision   Room Organization independent

## 2017-04-19 NOTE — PLAN OF CARE
"Problem: Substance Withdrawal  Goal: Substance Withdrawal  Signs and symptoms of listed problems will be absent or manageable.  Outcome: Adequate for Discharge Date Met:  04/18/17  This nurse checked in briefly with Bryon oro. Explained that staff are still trying to input the information from yesterday's computer down time. He smiled and said it was OK with him if we did not get his admission done. He attended community meeting and has been visible in lounge watching TV. He complained that he did not get enough trazodone and won't be able to sleep. This nurse reminded him that he was sleeping soundly last night prior to getting any trazodone. He smiled again and said \"that's cause I was crashing.\"       "

## 2017-04-19 NOTE — PROGRESS NOTES
Petition for MICD commitment through [facility] has been submitted, a screener will come to assess patient.

## 2017-04-19 NOTE — PROGRESS NOTES
Brief Medicine Note    Patient's BPs have been in the 150s/80s. Currently 139/90. Will continue current BP regimen.    Patient's labs today:   - Mg mildly elevated to 2.4 likely 2/2 to kidney disease. May repeat labs with follow up with outpatient nephrologist.   - Potassium wnl 4.0 today following 40meq of potassium chloride given 4/18.  - Lipid panel is suggestive of hyperlipidemia. Will start atorvastatin 10mg.     Today's vital signs, medications, and nursing notes were reviewed.      BP (!) 157/99  Pulse 65  Temp 97.1  F (36.2  C) (Tympanic)  Resp 16  Wt 103.4 kg (228 lb)  SpO2 100%  BMI 36.8 kg/m2     Currently, this patient is medically stable and internal medicine will sign off. Please page the Internal Medicine job code pager for any intercurrent medical issues which arise. Thank you for the opportunity to be a part of this patient's care.    Malcolm Dodd PA-C  Hospital Medicine  Pager: [phone]

## 2017-04-19 NOTE — PROGRESS NOTES
"    ----------------------------------------------------------------------------------------------------------  [facility], [place]   Psychiatric Progress Note     Assessment    Presentation: Bryon Alonzo is a 32 year old male with a history of substance use disorder (crack, cocaine), Bipolar II, who presents with cocaine and crack, stating \"I wanted to use more and more and didn't care if I  because of it\". He was discharged 17 for a similar hospitalization. The discharge plan three days ago was for Bryon to take a cab to get a Rule 25 assessment. He expressed \" I like cocaine and the way it makes me feel\" and that \"the moment I got in that cab to my Rule 25, I decided 'no'\". Over the past three days he reports of spending $4000 on 4-5oz of crack and cocaine. He states that \"I haven't slept or been sober since the moment I left the hospital\". He also has not taken any of his medications during this time. He decided to return to the hospital today because the \"demon inside me has been wasted to nothing\". He thinks this hospitalization will be different from the last because he is \"ready to be clean and go to treatment\".    Diagnostic Impression: Patient has a long history of cocaine /crack use disorder. He gets suicidal while on cocaine and after a brief hospitalization and stabilization , restarts the use. Patient is diagnosed with cocaine use disorder. Diagnosis of bipolar II is questionable and the impact of possible Bipolar disorder on his current clinical picture is not known.      Hospital course: Bryon Alonzo was admitted to [facility]  as a voluntary patient and PTA meds restarted. He stated that he never took the medications after discharge. He agreed with team on petitioning for commitment and expressed his wish to get into a  treatment facility. Within 1-2 days, the patient stabilized and thereafter did not report or appear to have psychosis or SI. He " insisted he had a change and was now motivated for CD treatment and expressed remorse for his relapse.     Medical course Patient with history of HTN, was hypertensive at unit and the BP readings are downtrending. He also initially expressed some chest pain at ED when the EKG and Troponin were obtained and were unchanged from past. At the unit patient did not report any chest pain. He had some shortness of breath for which he was given albuterol (helped). On 04/19, he notes lower right quadrant abdominal pain that is constant and sharp in nature and worsens with palpation. He was given one dose of tylenol without benefit.    Plan     Principal Diagnosis:   #Cocaine/crack intoxication  #Bipolar disorder  - withdrawal precautions  - start petitioning process for MI + CD commitment (drug-induced cardiomyopathy, CKD, nonadherent to entering treatment)     Medications:   Continue:   -Risperidone 1mg QHS  -Hydroxyzine 25-50 PRN for anxiety  -Olanzapine for emergency  -Trazodone PRN for sleep  -Folic Acid  -Thiamine   -Multivitamin     Laboratory/Imaging: Admission labs were unremarkable/unchanged from past.      Consults:   Internal Medicine     Patient will be treated in therapeutic milieu with appropriate individual and group therapies as described.     Medical diagnoses to be addressed this admission:    #RLQ abdominal pain: Patient reports began 04/18 in the afternoon. It is sharp and constant in nature, worsens to palpation. This is a new pain that he has not experienced before. No bowel/bladder symptoms. Nothing is noted to make it better or worse. First dose of Tylenol did not alleviate the pain. No history of abdominal surgery. CBC on 04/17 showed mildly elevated WBC at 11.6. Patient appears comfortable  - Tylenol 650mg PO q4h PRN  - continue to monitor for improvement, low threshold for alerting medicine and drawing repeat CBC    # h/o non-ischemic cardiomyopathy: Patient reports history of chest pain, now  respolved. Troponin I chronically elevated , unchanged from base line. EKG NSR. Medicine recommends not rechecking troponin levels.  - ASA tab 81 mg daily    # HTN/CKD: goal BP <130/90  - Labetalol tab 200 mg BID  - lisinopril tab 30 mg daily  - could consider restarting amlodipine if BP persistently elevated, per medicine rec  - avoid nephrotoxins  - f/u with outpatient nephrologist (scheduled)      # asthma  - Albuterol PRN      #GERD:  - Cont zantac BID      #hyperlipidemia: results suggestive of hyperlipidemia. Patient was once on Atorvastatin. Unclear why it was stopped.      Consults: Medicine, appreciate recs     Legal Status: Voluntary     Safety Assessment:   Checks: Status 15  Precautions: substance withdrawal, suicide  Pt has not required locked seclusion or restraints in the past 24 hours to maintain safety, please refer to RN documentation for further details.    The risks, benefits, alternatives and side effects have been discussed and are understood by the patient and other caregivers.     Anticipated Disposition/Discharge Date: Unknown at this time. Pending further clinical evaluation and stabilization.    Scribed by Jyoti Hough, MS3, for Dr. Fernando Crawford, psychiatrist, on 04/19/17 at 12:15pm.  Psychiatry Attending Attestation:  This patient has been seen and evaluated by me, Fernando Crawford M.D.  The patient's condition and treatment plan were discussed with the treatment team, and care coordinated with the CTC and RN. I reviewed, edited and agree with the findings and plan in this note which was scribed by the student on my behalf to reflect the content of the patient interview and treatment planning.     Fernando Crawford M.D.   of Psychiatry     Interim History:   The patient's care was discussed with the treatment team and chart notes were reviewed.    Vitals: hypertensive to 150s/90s. Remaining vitals within normal limits.  Sleep: 5.5 hr  Meds: compliant. PRNs given:  "tylenol once, trazodone twice, albuterol three times.  Staff note: reviewed    Subjective:  Today during the interview with the treatment team, Bryon was calm and conversational. He agrees to go along with the commitment and treatment process. He is excited about his conversation with the  yesterday about a possible program with lodging and work that he could go to located outside of the city.    His main focus today is new abdominal pain that he says began yesterday afternoon. It is located in the right lower quadrant, is sharp and constant in nature, worsens with palpation, and did not resolve with first dose of Tylenol.  He notes he's never experienced this pain before, denies any other symptoms including bowel/bladder, and has never had any abdominal surgeries. He is willing to try more Tylenol today. He also notes some joint pain, especially his right great toe, and says, \"My joints keep locking up.\"    Review of systems:     The Review of Systems is negative other than noted above         Medications:     Current Facility-Administered Medications   Medication     atorvastatin (LIPITOR) tablet 10 mg     acetaminophen (TYLENOL) tablet 650 mg     albuterol (PROAIR HFA/PROVENTIL HFA/VENTOLIN HFA) Inhaler 2 puff     EPINEPHrine injection 0.3 mg     labetalol (NORMODYNE) tablet 200 mg     lisinopril (PRINIVIL/ZESTRIL) tablet 30 mg     risperiDONE (risperDAL) tablet 1 mg     hydrOXYzine (ATARAX) tablet 25-50 mg     traZODone (DESYREL) half-tab 25 mg     ranitidine (ZANTAC) tablet 150 mg     potassium chloride SA (K-DUR/KLOR-CON M) CR tablet 20-40 mEq     potassium chloride (KLOR-CON) Packet 20-40 mEq     aspirin chewable tablet 81 mg     albuterol neb solution 2.5 mg             Allergies:     Allergies   Allergen Reactions     Banana Anaphylaxis            Psychiatric Examination:   BP (!) 157/99  Pulse 65  Temp 97.1  F (36.2  C) (Tympanic)  Resp 16  Wt 103.4 kg (228 lb)  SpO2 100%  BMI 36.8 " kg/m2  Weight is 228 lbs 0 oz  Body mass index is 36.8 kg/(m^2).    Appearance:  awake, alert, adequately groomed and dressed in hospital scrubs  Attitude:  cooperative  Eye Contact:  good  Mood:  better  Affect:  appropriate and in normal range  Speech:  clear, coherent  Psychomotor Behavior:  no evidence of tardive dyskinesia, dystonia, or tics  Thought Process:  logical, linear and goal oriented   Associations:  no loose associations  Thought Content:  no evidence of suicidal ideation or homicidal ideation, no evidence of psychotic thought, no auditory hallucinations present and no visual hallucinations present  Insight:  good  Judgment:  limited  Oriented to:  time, person, and place  Attention Span and Concentration:  intact  Recent and Remote Memory:  intact  Language: Able to name objects, Able to repeat phrases and Able to read and write  Fund of Knowledge: appropriate  Muscle Strength and Tone: normal  Gait and Station: Normal         Labs:     Recent Results (from the past 24 hour(s))   Potassium    Collection Time: 04/19/17  7:34 AM   Result Value Ref Range    Potassium 4.0 3.4 - 5.3 mmol/L   Magnesium    Collection Time: 04/19/17  7:34 AM   Result Value Ref Range    Magnesium 2.4 (H) 1.6 - 2.3 mg/dL   Creatinine    Collection Time: 04/19/17  7:34 AM   Result Value Ref Range    Creatinine 1.55 (H) 0.66 - 1.25 mg/dL    GFR Estimate 52 (L) >60 mL/min/1.7m2    GFR Estimate If Black 62 >60 mL/min/1.7m2       Antipsychotic Labs:  Recent Labs   Lab Test  04/13/17   0723  08/19/16   0718  05/25/16   0733   CHOL  213*  274*  177   TRIG  179*  181*  151*   LDL  102*  183*  98   HDL  75  55  49     Recent Labs   Lab Test  04/17/17   0501  04/12/17   0414  09/21/16   0711   GLC  111*  132*  80     Recent Labs   Lab Test  04/17/17   0501  04/12/17   0414  09/05/16   0926   WBC  11.6*  11.3*  8.8   ANEU  7.2  9.0*  5.9   HGB  13.2*  13.4  12.9*   PLT  455*  395  329

## 2017-04-20 LAB
C TRACH DNA SPEC QL NAA+PROBE: NORMAL
N GONORRHOEA DNA SPEC QL NAA+PROBE: NORMAL
SPECIMEN SOURCE: NORMAL
SPECIMEN SOURCE: NORMAL

## 2017-04-20 PROCEDURE — 25000132 ZZH RX MED GY IP 250 OP 250 PS 637: Performed by: PSYCHIATRY & NEUROLOGY

## 2017-04-20 PROCEDURE — 25000132 ZZH RX MED GY IP 250 OP 250 PS 637: Performed by: PHYSICIAN ASSISTANT

## 2017-04-20 PROCEDURE — 25000125 ZZHC RX 250: Performed by: PHYSICIAN ASSISTANT

## 2017-04-20 PROCEDURE — 12400001 ZZH R&B MH UMMC

## 2017-04-20 PROCEDURE — 25000132 ZZH RX MED GY IP 250 OP 250 PS 637: Performed by: STUDENT IN AN ORGANIZED HEALTH CARE EDUCATION/TRAINING PROGRAM

## 2017-04-20 PROCEDURE — 99232 SBSQ HOSP IP/OBS MODERATE 35: CPT | Performed by: PSYCHIATRY & NEUROLOGY

## 2017-04-20 PROCEDURE — 97150 GROUP THERAPEUTIC PROCEDURES: CPT | Mod: GO

## 2017-04-20 RX ADMIN — RISPERIDONE 1 MG: 1 TABLET ORAL at 20:25

## 2017-04-20 RX ADMIN — LABETALOL HCL 200 MG: 200 TABLET, FILM COATED ORAL at 20:26

## 2017-04-20 RX ADMIN — RANITIDINE HYDROCHLORIDE 150 MG: 150 TABLET, FILM COATED ORAL at 08:20

## 2017-04-20 RX ADMIN — ASPIRIN 81 MG CHEWABLE TABLET 81 MG: 81 TABLET CHEWABLE at 08:20

## 2017-04-20 RX ADMIN — LABETALOL HCL 200 MG: 200 TABLET, FILM COATED ORAL at 08:19

## 2017-04-20 RX ADMIN — ALBUTEROL SULFATE 2.5 MG: 2.5 SOLUTION RESPIRATORY (INHALATION) at 20:26

## 2017-04-20 RX ADMIN — ALBUTEROL SULFATE 2 PUFF: 90 AEROSOL, METERED RESPIRATORY (INHALATION) at 04:09

## 2017-04-20 RX ADMIN — LISINOPRIL 30 MG: 20 TABLET ORAL at 08:20

## 2017-04-20 RX ADMIN — ATORVASTATIN CALCIUM 10 MG: 10 TABLET, FILM COATED ORAL at 20:25

## 2017-04-20 RX ADMIN — RANITIDINE HYDROCHLORIDE 150 MG: 150 TABLET, FILM COATED ORAL at 17:55

## 2017-04-20 RX ADMIN — Medication 25 MG: at 20:26

## 2017-04-20 ASSESSMENT — ACTIVITIES OF DAILY LIVING (ADL)
ORAL_HYGIENE: INDEPENDENT
LAUNDRY: WITH SUPERVISION
DRESS: INDEPENDENT
LAUNDRY: WITH SUPERVISION
DRESS: SCRUBS (BEHAVIORAL HEALTH)
GROOMING: HANDWASHING;INDEPENDENT
ORAL_HYGIENE: INDEPENDENT
GROOMING: INDEPENDENT

## 2017-04-20 NOTE — PROGRESS NOTES
04/20/17 1413   Behavioral Health   Hallucinations denies / not responding to hallucinations   Thinking poor concentration   Orientation situation, disoriented;person: oriented;place: oriented   Memory baseline memory   Insight admits / accepts   Judgement impaired   Eye Contact at examiner   Affect blunted, flat   Mood mood is calm;other (see comments)  (somewhat irritable)   Physical Appearance/Attire appears stated age;attire appropriate to age and situation   Hygiene well groomed   Suicidality other (see comments)  (denies currently)   Self Injury other (see comment)  (denies currently)   Activity withdrawn;isolative;other (see comment)  (resting for majority of shift)   Speech coherent;clear   Medication Sensitivity no stated side effects;no observed side effects   Psychomotor / Gait balanced;steady     Pt spent most of the day in bed resting. Pt was intermittently out in the milieu. Pt did attend community meeting but did not attend any other groups. Pt was mostly calm with some mild irritability. Affect is flat. Somewhat social with peers when out in the milieu. Pt independent with ADL's (took a shower and did laundry). No psychotic symptoms observed or reported. No behavioral issues.

## 2017-04-20 NOTE — PROGRESS NOTES
"Bryon  attended 3 of 3 OT groups today. Was forthcoming about his challenges with moderation: \"I can work hard, bang it out, make a deal, but when I party, I can't stop.\" Identified addiction as his primary mental health issue. Interacted sociably with peers. Bright and charismatic.     "

## 2017-04-20 NOTE — PROGRESS NOTES
Pt was present within milieu all evening. Pt was friendly and sociable with peers and staff. Denies any MH symptoms at this time and expressed no concern about his medications. Pt discussed concern with getting out of here and returning to his old ways, but stated that as long as he could keep the heeby magoebies off him that he should be fine.          04/19/17 2038   Behavioral Health   Hallucinations denies / not responding to hallucinations   Thinking poor concentration   Orientation person: oriented;place: oriented;date: oriented;time: oriented   Memory baseline memory   Insight admits / accepts   Judgement impaired   Eye Contact at examiner   Affect blunted, flat   Mood grandiose;mood is calm   Physical Appearance/Attire attire appropriate to age and situation   Hygiene well groomed   Suicidality (denies)   Self Injury (denies)   Activity (active and social)   Speech clear;coherent   Medication Sensitivity no stated side effects;no observed side effects   Psychomotor / Gait balanced;steady   Psycho Education   Type of Intervention 1:1 intervention   Response participates, initiates socially appropriate   Hours 0.5   Treatment Detail (check in)   Activities of Daily Living   Hygiene/Grooming independent   Oral Hygiene independent   Dress scrubs (behavioral health);independent   Room Organization independent

## 2017-04-20 NOTE — PROGRESS NOTES
"     ----------------------------------------------------------------------------------------------------------  [facility], [place]   Psychiatric Progress Note     Assessment    Presentation: Bryon Alonzo is a 32 year old male with a history of substance use disorder (crack, cocaine), Bipolar II, who presents with cocaine and crack, stating \"I wanted to use more and more and didn't care if I  because of it\". He was discharged 17 for a similar hospitalization. The discharge plan three days ago was for Bryon to take a cab to get a Rule 25 assessment. He expressed \" I like cocaine and the way it makes me feel\" and that \"the moment I got in that cab to my Rule 25, I decided 'no'\". Over the past three days he reports of spending $4000 on 4-5oz of crack and cocaine. He states that \"I haven't slept or been sober since the moment I left the hospital\". He also has not taken any of his medications during this time. He decided to return to the hospital today because the \"demon inside me has been wasted to nothing\". He thinks this hospitalization will be different from the last because he is \"ready to be clean and go to treatment\".     Diagnostic Impression: Patient has a long history of cocaine /crack use disorder. He gets suicidal while on cocaine and after a brief hospitalization and stabilization , restarts the use. Patient is diagnosed with cocaine use disorder. Diagnosis of bipolar II is questionable and the impact of possible Bipolar disorder on his current clinical picture is not known.      Hospital course: Bryon Alonzo was admitted to station 22 as a voluntary patient and PTA meds restarted. He stated that he never took the medications after discharge. He agreed with team on petitioning for commitment and expressed his wish to get into a  treatment facility. WIthin 2 days he was denying, and did not appear to be objectively experiencing, hallucinations, paranoia and " SI. He insisted that he now could be trusted and blamed the treatment team for recommending [facility] if we really had a better plan for him, when in fact, other options were quite limited. He was flippant and also portrayed him self as powerless to resist crack use because of the large amount of money he has possession of.     Medical course Patient with history of HTN, was hypertensive at unit and the BP readings are downtrending. He also initially expressed some chest pain at ED when the EKG and Troponin were obtained and were unchanged from past. At the unit patient did not report any chest pain. He had some shortness of breath for which he was given albuterol (helped). On 04/19, he noted lower right quadrant abdominal pain that is constant and sharp in nature and worsens with palpation. He was given Tylenol. The pain was not a concern the following day.     Plan      Principal Diagnosis:   #Cocaine/crack intoxication  #Possible cyclothymia or BP II, disorder  - withdrawal precautions, as might be drinking too  - start petitioning process for MICD commitment (drug-induced cardiomyopathy, CKD, nonadherent to entering treatment)  - commitment screener to assess      Medications:   Continue:   -Risperidone 1mg QHS  -Hydroxyzine 25-50 PRN for anxiety  -Olanzapine for emergency  -Trazodone PRN for sleep  -Folic Acid  -Thiamine   -Multivitamin      Laboratory/Imaging: Admission labs were unremarkable/unchanged from past.       Consults:   Internal Medicine - recs appreciated, signed off 04/19      Patient will be treated in therapeutic milieu with appropriate individual and group therapies as described.      Medical diagnoses to be addressed this admission:    # h/o non-ischemic cardiomyopathy: Patient reports history of chest pain, now respolved. Troponin I chronically elevated , unchanged from base line. EKG NSR. Medicine recommends not rechecking troponin levels.  - ASA tab 81 mg daily     # HTN/CKD: goal BP  <130/90  - Labetalol tab 200 mg BID  - Lisinopril tab 30 mg daily  - could consider restarting amlodipine if BP persistently elevated, per medicine rec  - avoid nephrotoxins  - f/u with outpatient nephrologist (scheduled)     #hyperlipidemia: results suggestive of hyperlipidemia. Patient previously on Atorvastatin, unclear why this was stopped.  - restart Atorvastatin 10mg per medicine recs    #New onset RLQ abdominal pain, resolved: Patient reports began 04/18 in the afternoon. It is sharp and constant in nature, worsens to palpation. This is a new pain that he has not experienced before. No bowel/bladder symptoms. Nothing is noted to make it better or worse. First dose of Tylenol did not alleviate the pain. No history of abdominal surgery. CBC on 04/17 showed mildly elevated WBC at 11.6. Patient appears comfortable. No mention of pain on 04/20.  - Tylenol 650mg PO q4h PRN  - continue to monitor for improvement, low threshold for alerting medicine and drawing repeat CBC    # asthma  - albuterol PRN      #GERD:  - continue Zantac BID        Consults: Medicine, appreciate recs. Signed off 04/19/17.      Legal Status: Voluntary      Safety Assessment:   Checks: Status 15  Precautions: substance withdrawal, suicide  Pt has not required locked seclusion or restraints in the past 24 hours to maintain safety, please refer to RN documentation for further details.    The risks, benefits, alternatives and side effects have been discussed and are understood by the patient and other caregivers.     Anticipated Disposition/Discharge Date: Unknown at this time. Pending further clinical evaluation and stabilization.     Scribed by Jyoti Hough, MS3, for Dr. Fernando Crawford, psychiatrist, on 04/20/17 at 1:45pm.      Psychiatry Attending Attestation:  This patient has been seen and evaluated by me, Fernando Crawford M.D.  The patient's condition and treatment plan were discussed with the treatment team, and care coordinated with the  "CTC and RN. I reviewed, edited and agree with the findings and plan in this note which was scribed by the student on my behalf to reflect the content of the patient interview and treatment planning.     Fernando Crawford M.D.   of Psychiatry    Interim History:   The patient's care was discussed with the treatment team and chart notes were reviewed.     Vitals: hypertensive to 150s/90s. Remaining vitals within normal limits.  Sleep: 6.25 hr  Meds: compliant. PRNs given: trazodone x1, albuterol x1  Staff note: reviewed     Subjective:  Today's interview with Bryon was abbreviated. He quickly became confrontational, stating, \"Don't you put words in my mouth,\" when discussing past and present treatment plan options. He wishes to speak with the  (instead of the psychiatry team) because, \"She is the only person that can help [him] figure out\" where he'll go next.     Review of systems:      The Review of Systems is negative other than noted above     Medications:          Current Facility-Administered Medications   Medication     atorvastatin (LIPITOR) tablet 10 mg     acetaminophen (TYLENOL) tablet 650 mg     albuterol (PROAIR HFA/PROVENTIL HFA/VENTOLIN HFA) Inhaler 2 puff     EPINEPHrine injection 0.3 mg     labetalol (NORMODYNE) tablet 200 mg     lisinopril (PRINIVIL/ZESTRIL) tablet 30 mg     risperiDONE (risperDAL) tablet 1 mg     hydrOXYzine (ATARAX) tablet 25-50 mg     traZODone (DESYREL) half-tab 25 mg     ranitidine (ZANTAC) tablet 150 mg     potassium chloride SA (K-DUR/KLOR-CON M) CR tablet 20-40 mEq     potassium chloride (KLOR-CON) Packet 20-40 mEq     aspirin chewable tablet 81 mg     albuterol neb solution 2.5 mg          Allergies:      Allergies   Allergen Reactions     Banana Anaphylaxis         Psychiatric Examination:   BP (!) 157/99  Pulse 65  Temp 97.1  F (36.2  C) (Tympanic)  Resp 16  Wt 103.4 kg (228 lb)  SpO2 100%  BMI 36.8 kg/m2  Weight is 228 lbs 0 oz Body " "mass index is 36.8 kg/(m^2).     Appearance: awake, alert, adequately groomed and dressed in hospital scrubs  Attitude: confrontational  Eye Contact: good  Mood: \"good\"  Affect: labile  Speech: clear, coherent  Psychomotor Behavior: no evidence of tardive dyskinesia, dystonia, or tics  Thought Process: linear and goal oriented   Associations: no loose associations  Thought Content: no evidence of suicidal ideation or homicidal ideation, no evidence of psychotic thought, no auditory hallucinations present and no visual hallucinations present  Insight: good  Judgment: limited  Oriented to: time, person, and place  Attention Span and Concentration: intact  Recent and Remote Memory: intact  Language: Able to name objects, Able to repeat phrases and Able to read and write  Fund of Knowledge: appropriate  Muscle Strength and Tone: normal  Gait and Station: Normal     Labs:   No new lab results.    Pending: brigid SIEGEL      Antipsychotic Labs:  Recent Labs   Lab Test 04/13/17  0723 08/19/16  0718 05/25/16  0733   CHOL 213* 274* 177   TRIG 179* 181* 151*   * 183* 98   HDL 75 55 49            Recent Labs   Lab Test 04/17/17  0501 04/12/17  0414 09/21/16  0711   * 132* 80            Recent Labs   Lab Test 04/17/17  0501 04/12/17  0414 09/05/16  0926   WBC 11.6* 11.3* 8.8   ANEU 7.2 9.0* 5.9   HGB 13.2* 13.4 12.9*   * 395 329              "

## 2017-04-21 PROCEDURE — 25000132 ZZH RX MED GY IP 250 OP 250 PS 637: Performed by: PSYCHIATRY & NEUROLOGY

## 2017-04-21 PROCEDURE — 25000132 ZZH RX MED GY IP 250 OP 250 PS 637: Performed by: PHYSICIAN ASSISTANT

## 2017-04-21 PROCEDURE — 99232 SBSQ HOSP IP/OBS MODERATE 35: CPT | Mod: GC | Performed by: PSYCHIATRY & NEUROLOGY

## 2017-04-21 PROCEDURE — 12400001 ZZH R&B MH UMMC

## 2017-04-21 PROCEDURE — 25000132 ZZH RX MED GY IP 250 OP 250 PS 637: Performed by: STUDENT IN AN ORGANIZED HEALTH CARE EDUCATION/TRAINING PROGRAM

## 2017-04-21 PROCEDURE — 97150 GROUP THERAPEUTIC PROCEDURES: CPT | Mod: GO

## 2017-04-21 PROCEDURE — 90853 GROUP PSYCHOTHERAPY: CPT

## 2017-04-21 RX ADMIN — Medication 25 MG: at 20:40

## 2017-04-21 RX ADMIN — LABETALOL HCL 200 MG: 200 TABLET, FILM COATED ORAL at 09:37

## 2017-04-21 RX ADMIN — LISINOPRIL 30 MG: 20 TABLET ORAL at 09:38

## 2017-04-21 RX ADMIN — ASPIRIN 81 MG CHEWABLE TABLET 81 MG: 81 TABLET CHEWABLE at 09:36

## 2017-04-21 RX ADMIN — LABETALOL HCL 200 MG: 200 TABLET, FILM COATED ORAL at 20:40

## 2017-04-21 RX ADMIN — RISPERIDONE 1 MG: 1 TABLET ORAL at 20:40

## 2017-04-21 RX ADMIN — ALBUTEROL SULFATE 2 PUFF: 90 AEROSOL, METERED RESPIRATORY (INHALATION) at 20:42

## 2017-04-21 RX ADMIN — RANITIDINE HYDROCHLORIDE 150 MG: 150 TABLET, FILM COATED ORAL at 20:40

## 2017-04-21 RX ADMIN — RANITIDINE HYDROCHLORIDE 150 MG: 150 TABLET, FILM COATED ORAL at 09:36

## 2017-04-21 RX ADMIN — ATORVASTATIN CALCIUM 10 MG: 10 TABLET, FILM COATED ORAL at 20:40

## 2017-04-21 ASSESSMENT — ACTIVITIES OF DAILY LIVING (ADL)
HYGIENE/GROOMING: INDEPENDENT
ORAL_HYGIENE: INDEPENDENT
LAUNDRY: WITH SUPERVISION
DRESS: INDEPENDENT
DRESS: INDEPENDENT
ORAL_HYGIENE: INDEPENDENT
GROOMING: INDEPENDENT
LAUNDRY: WITH SUPERVISION

## 2017-04-21 NOTE — PROGRESS NOTES
"     ----------------------------------------------------------------------------------------------------------  [facility], [place]   Psychiatric Progress Note     Assessment    Presentation: Bryon Alonzo is a 32 year old male with a history of substance use disorder (crack, cocaine), Bipolar II, who presents with cocaine and crack, stating \"I wanted to use more and more and didn't care if I  because of it\". He was discharged 17 for a similar hospitalization. The discharge plan three days ago was for Bryon to take a cab to get a Rule 25 assessment. He expressed \" I like cocaine and the way it makes me feel\" and that \"the moment I got in that cab to my Rule 25, I decided 'no'\". Over the past three days he reports of spending $4000 on 4-5oz of crack and cocaine. He states that \"I haven't slept or been sober since the moment I left the hospital\". He also has not taken any of his medications during this time. He decided to return to the hospital today because the \"demon inside me has been wasted to nothing\". He thinks this hospitalization will be different from the last because he is \"ready to be clean and go to treatment\".     Diagnostic Impression: Patient has a long history of cocaine /crack use disorder. He gets suicidal while on cocaine and after a brief hospitalization and stabilization , restarts the use. Patient is diagnosed with cocaine use disorder. Diagnosis of bipolar II is questionable and the impact of possible Bipolar disorder on his current clinical picture is not known.      Hospital course: Bryon Alonzo was admitted to station 22 as a voluntary patient and PTA meds restarted. He stated that he never took the medications after discharge. He agreed with team on petitioning for commitment and expressed his wish to get into a  treatment facility. WIthin 2 days he was denying, and did not appear to be objectively experiencing, hallucinations, paranoia and " SI. He insisted that he now could be trusted and blamed the treatment team for recommending [facility] if we really had a better plan for him, when in fact, other options were quite limited. He was flippant and also portrayed him self as powerless to resist crack use because of the large amount of money he has possession of.     Medical course Patient with history of HTN, was hypertensive at unit and the BP readings are downtrending. He also initially expressed some chest pain at ED when the EKG and Troponin were obtained and were unchanged from past. At the unit patient did not report any chest pain. He had some shortness of breath for which he was given albuterol (helped). On 04/19, he noted lower right quadrant abdominal pain that is constant and sharp in nature and worsens with palpation. He was given Tylenol. The pain was not a concern the following day.     Plan      Principal Diagnosis:   #Cocaine/crack intoxication  #Possible cyclothymia or BP II, disorder  - withdrawal precautions, as might be drinking too  - start petitioning process for MICD commitment (drug-induced cardiomyopathy, CKD, nonadherent to entering treatment)  - commitment screener to assess      Medications:   Continue:   -Risperidone 1mg QHS  -Hydroxyzine 25-50 PRN for anxiety  -Olanzapine for emergency  -Trazodone PRN for sleep  -Folic Acid  -Thiamine   -Multivitamin      Laboratory/Imaging: Admission labs were unremarkable/unchanged from past.       Consults:   Internal Medicine - recs appreciated, signed off 04/19      Patient will be treated in therapeutic milieu with appropriate individual and group therapies as described.      Medical diagnoses to be addressed this admission:    # h/o non-ischemic cardiomyopathy: Patient reports history of chest pain, now respolved. Troponin I chronically elevated , unchanged from base line. EKG NSR. Medicine recommends not rechecking troponin levels.  - ASA tab 81 mg daily     # HTN/CKD: goal BP  <130/90: BP has been consistently elevated to 150s/90s.  - Labetalol tab 200 mg BID  - Lisinopril tab 30 mg daily  - could consider restarting amlodipine if BP persistently elevated, per medicine rec  - avoid nephrotoxins  - f/u with outpatient nephrologist (scheduled)     #hyperlipidemia: results suggestive of hyperlipidemia. Patient previously on Atorvastatin, unclear why this was stopped.  - restart Atorvastatin 10mg per medicine recs    #New onset RLQ abdominal pain, resolved: Patient reports began 04/18 in the afternoon. It is sharp and constant in nature, worsens to palpation. This is a new pain that he has not experienced before. No bowel/bladder symptoms. Nothing is noted to make it better or worse. First dose of Tylenol did not alleviate the pain. No history of abdominal surgery. CBC on 04/17 showed mildly elevated WBC at 11.6. Patient appears comfortable. No mention of pain on 04/20.  - Tylenol 650mg PO q4h PRN  - continue to monitor for improvement, low threshold for alerting medicine and drawing repeat CBC    # asthma  - albuterol PRN      #GERD:  - continue Zantac BID        Consults: Medicine, appreciate recs. Signed off 04/19/17.      Legal Status: Voluntary      Safety Assessment:   Checks: Status 15  Precautions: substance withdrawal, suicide  Pt has not required locked seclusion or restraints in the past 24 hours to maintain safety, please refer to RN documentation for further details.    The risks, benefits, alternatives and side effects have been discussed and are understood by the patient and other caregivers.     Anticipated Disposition/Discharge Date: Unknown at this time. Pending further clinical evaluation and stabilization.     Scribed by Jyoti Hough, MS3, for Dr. Melissa Jimenez, PGY1, on 04/21/17 at 2:00pm.    Attestation:  I have reviewed and edited the documentation recorded by the scribe. This documentation accurately reflects the services I personally performed and treatment decisions  "made by me in consultation with the attending physician.     Melissa Jimenez MD  Psychiatry PGY1     Psychiatry Attending Attestation:  This patient has been seen and evaluated by me, Fernando Crawford M.D.  The patient's condition and treatment plan were discussed with the resident, and care coordinated with the CTC and RN. I reviewed, edited and agree with the findings and plan in this note.     Fernando Crawford M.D.   of Psychiatry    Interim History:   The patient's care was discussed with the treatment team and chart notes were reviewed.     Vitals: hypertensive to 150s/90s. Remaining vitals within normal limits.  Sleep: 6 hr  Meds: compliant. PRNs given: trazodone x1, albuterol inhaler x1, albuterol nebulizer x1  Staff note: reviewed     Subjective:  Today's interview with Bryon was much calmer than yesterday's attempt. When asked about his mood, he states that it's about the same and says, \"It's just another day in the hospital. I'm alive, so I guess I'm winning.\" He notes that he has continued to wake up every 2-3 hours at night. He has no continued body aches, no adverse medication effects, and no further concerns. He says he's just waiting for CD placement. Curbsided medicine on management of blood pressure. Awaiting their response.      Review of systems:      The Review of Systems is negative other than noted above     Medications:    Scheduled:    atorvastatin  10 mg Oral Daily     labetalol  200 mg Oral BID     lisinopril (PRINIVIL/ZESTRIL) tablet 30 mg  30 mg Oral Daily     risperiDONE (risperDAL) tablet 1 mg  1 mg Oral At Bedtime     ranitidine  150 mg Oral BID     aspirin  81 mg Oral Daily        PRN:  acetaminophen, albuterol, EPINEPHrine, hydrOXYzine, traZODone, potassium chloride, potassium chloride, albuterol     Allergies:      Allergies   Allergen Reactions     Banana Anaphylaxis         Psychiatric Examination:   Vital signs:  Temp: 97.6  F (36.4  C) Temp src: Oral " "BP: 154/85 Pulse: 69   Resp: 12         Weight: 104.3 kg (230 lb)  Estimated body mass index is 37.12 kg/(m^2) as calculated from the following:    Height as of 4/12/17: 1.676 m (5' 6\").    Weight as of this encounter: 104.3 kg (230 lb).     Appearance: awake, alert, adequately groomed and dressed in hospital scrubs  Attitude: cooperative  Eye Contact: good  Mood: \"same\" (good)  Affect: mood congruent  Speech: clear, coherent  Psychomotor Behavior: no evidence of tardive dyskinesia, dystonia, or tics  Thought Process: linear and goal oriented   Associations: no loose associations  Thought Content: no evidence of suicidal ideation or homicidal ideation, no evidence of psychotic thought, no auditory hallucinations present and no visual hallucinations present  Insight: good  Judgment: limited  Oriented to: time, person, and place  Attention Span and Concentration: intact  Recent and Remote Memory: intact  Language: Able to name objects, Able to repeat phrases and Able to read and write  Fund of Knowledge: appropriate  Muscle Strength and Tone: normal  Gait and Station: Normal     Labs:   Chlamydia trachomatis PCR: negative  Neisseria gonorrhea PCR: negative      Antipsychotic Labs:        Recent Labs   Lab Test 04/13/17  0723 08/19/16  0718 05/25/16  0733   CHOL 213* 274* 177   TRIG 179* 181* 151*   * 183* 98   HDL 75 55 49            Recent Labs   Lab Test 04/17/17  0501 04/12/17  0414 09/21/16  0711   * 132* 80            Recent Labs   Lab Test 04/17/17  0501 04/12/17  0414 09/05/16  0926   WBC 11.6* 11.3* 8.8   ANEU 7.2 9.0* 5.9   HGB 13.2* 13.4 12.9*   * 395 329              "

## 2017-04-21 NOTE — PLAN OF CARE
"Problem: Substance Withdrawal  Goal: Social and Therapeutic (Substance Withdrawal)  Signs and symptoms of listed problems will be absent or manageable.   Outcome: Jorge Slater has been visible on unit, social with select peers. Has full range of affect. This nurse asked him if he would be willing to work on his admission interview for nursing. He smiled and responded, \"I don't want to answer a whole bunch of questions!\" This nurse explained that some information could be gleaned from the last admission, but still there were a few questions that would need to be answered. He has been cooperative with this process. Most of the information so far gathered was from the last admission. He does not want a flu or pneumonia shot. A few questions remain at this time.      "

## 2017-04-21 NOTE — PROGRESS NOTES
Slept til 1300, up for for lunch. No groups. Social in the milieu. Bright affect.     04/21/17 1100   Behavioral Health   Hallucinations denies / not responding to hallucinations   Thinking intact   Orientation person: oriented;place: oriented;date: oriented;time: oriented   Memory baseline memory   Insight admits / accepts   Judgement intact   Eye Contact at examiner   Affect full range affect   Mood mood is calm   Physical Appearance/Attire attire appropriate to age and situation   Speech clear;coherent   Psychomotor / Gait balanced;steady   Psycho Education   Type of Intervention structured groups   Response unavailable   Activities of Daily Living   Hygiene/Grooming independent   Oral Hygiene independent   Dress independent   Laundry with supervision   Room Organization independent

## 2017-04-21 NOTE — PLAN OF CARE
Problem: General Plan of Care (Inpatient Behavioral)  Goal: Team Discussion  Team Plan:   BEHAVIORAL TEAM DISCUSSION     Continued Stay Criteria/Rationale: Suicidal ideation in the context of cocaine use.  Plan: Placement in an MI/CD treatment facility.   Participants: Zuleika Gaines SW,   Namrata Sharpe RN, Jyoti Hough MS3  Dr. Fernando Crawford MD, Dr. Melissa Jimenez MD  Summary/Recommendation: Patient has some concerns regarding the wait time of the treatment program and would like to get into the program that has the shortest wait time.   Progress: Improving

## 2017-04-21 NOTE — PROGRESS NOTES
04/20/17 2200   Behavioral Health   Hallucinations denies / not responding to hallucinations   Thinking intact   Orientation person: oriented;place: oriented   Memory baseline memory   Insight admits / accepts   Judgement intact   Eye Contact at examiner   Affect blunted, flat   Mood mood is calm   Physical Appearance/Attire flamboyant   Hygiene neglected grooming - unclean body, hair, teeth   Suicidality other (see comments)   Self Injury other (see comment)   Activity isolative;withdrawn   Speech clear;coherent   Medication Sensitivity no stated side effects   Psychomotor / Gait balanced;steady   Coping/Psychosocial   Verbalized Emotional State acceptance   Plan Of Care Reviewed With patient   Patient Agreement with Plan of Care agrees   Psycho Education   Type of Intervention 1:1 intervention   Response participates with encouragement   Hours 0.5   Activities of Daily Living   Hygiene/Grooming independent   Oral Hygiene independent   Dress independent   Laundry with supervision   Room Organization independent   Activity   Activity Level of Assistance independent     Patient spent the evening in common area socializing with peers. He ate dinner and snack with peers in common area. Mood was calm and quiet .Affect was tense and blunted. Continue demand for food and medication . His girlfriend brought him some food this evening and He give her a credit card.

## 2017-04-22 PROCEDURE — 25000132 ZZH RX MED GY IP 250 OP 250 PS 637: Performed by: STUDENT IN AN ORGANIZED HEALTH CARE EDUCATION/TRAINING PROGRAM

## 2017-04-22 PROCEDURE — 25000132 ZZH RX MED GY IP 250 OP 250 PS 637: Performed by: PHYSICIAN ASSISTANT

## 2017-04-22 PROCEDURE — 25000132 ZZH RX MED GY IP 250 OP 250 PS 637: Performed by: PSYCHIATRY & NEUROLOGY

## 2017-04-22 PROCEDURE — 12400001 ZZH R&B MH UMMC

## 2017-04-22 RX ADMIN — Medication 25 MG: at 21:15

## 2017-04-22 RX ADMIN — LISINOPRIL 30 MG: 20 TABLET ORAL at 10:59

## 2017-04-22 RX ADMIN — LABETALOL HCL 200 MG: 200 TABLET, FILM COATED ORAL at 10:59

## 2017-04-22 RX ADMIN — ATORVASTATIN CALCIUM 10 MG: 10 TABLET, FILM COATED ORAL at 21:14

## 2017-04-22 RX ADMIN — RANITIDINE HYDROCHLORIDE 150 MG: 150 TABLET, FILM COATED ORAL at 17:37

## 2017-04-22 RX ADMIN — ASPIRIN 81 MG CHEWABLE TABLET 81 MG: 81 TABLET CHEWABLE at 10:58

## 2017-04-22 RX ADMIN — RISPERIDONE 1 MG: 1 TABLET ORAL at 21:14

## 2017-04-22 RX ADMIN — LABETALOL HCL 200 MG: 200 TABLET, FILM COATED ORAL at 21:14

## 2017-04-22 RX ADMIN — RANITIDINE HYDROCHLORIDE 150 MG: 150 TABLET, FILM COATED ORAL at 11:00

## 2017-04-22 ASSESSMENT — ACTIVITIES OF DAILY LIVING (ADL)
LAUNDRY: WITH SUPERVISION
GROOMING: INDEPENDENT
DRESS: STREET CLOTHES;INDEPENDENT
ORAL_HYGIENE: INDEPENDENT

## 2017-04-22 NOTE — PLAN OF CARE
Problem: Substance Withdrawal  Goal: Social and Therapeutic (Substance Withdrawal)  Signs and symptoms of listed problems will be absent or manageable.   Bryon was out in the lounge watching a tv show.  He was earlier on the phone then requesting staff to fax things for him for tasks he needed to accomplish.  He showered, dressed in street clothes and seems calm and relaxed.

## 2017-04-22 NOTE — PROGRESS NOTES
04/21/17 2200   Behavioral Health   Thoughts/Cognition (WDL) insight   Hallucinations denies / not responding to hallucinations   Thinking intact   Orientation person: oriented;place: oriented   Memory baseline memory   Insight admits / accepts   Judgement intact   Eye Contact at examiner   Affect full range affect   Mood mood is calm   Physical Appearance/Attire disheveled   Hygiene well groomed   Speech clear;coherent   Psychomotor / Gait balanced;steady   Safety   Suicidality status 15   Withdrawal monitor withdrawal process   Coping/Psychosocial   Verbalized Emotional State acceptance   Plan Of Care Reviewed With patient   Patient Agreement with Plan of Care agrees   Psycho Education   Type of Intervention structured groups   Response participates with encouragement   Hours 0.5   Activities of Daily Living   Hygiene/Grooming independent   Oral Hygiene independent   Dress independent   Laundry with supervision   Room Organization independent   Activity   Activity Level of Assistance independent     Patient spent the evening withdrawn and isolated to his room sleeping/napping . Mood was calm and quiet . Affect was tense and blunted. He ate dinner and snack with peers in common area. He was prompted for group but he decline. No sign of SI or SIB observed by this writer.

## 2017-04-23 PROCEDURE — 97150 GROUP THERAPEUTIC PROCEDURES: CPT | Mod: GO

## 2017-04-23 PROCEDURE — 25000132 ZZH RX MED GY IP 250 OP 250 PS 637: Performed by: PHYSICIAN ASSISTANT

## 2017-04-23 PROCEDURE — 25000132 ZZH RX MED GY IP 250 OP 250 PS 637: Performed by: STUDENT IN AN ORGANIZED HEALTH CARE EDUCATION/TRAINING PROGRAM

## 2017-04-23 PROCEDURE — 25000132 ZZH RX MED GY IP 250 OP 250 PS 637: Performed by: PSYCHIATRY & NEUROLOGY

## 2017-04-23 PROCEDURE — 12400001 ZZH R&B MH UMMC

## 2017-04-23 RX ADMIN — ATORVASTATIN CALCIUM 10 MG: 10 TABLET, FILM COATED ORAL at 21:33

## 2017-04-23 RX ADMIN — ASPIRIN 81 MG CHEWABLE TABLET 81 MG: 81 TABLET CHEWABLE at 08:35

## 2017-04-23 RX ADMIN — RANITIDINE HYDROCHLORIDE 150 MG: 150 TABLET, FILM COATED ORAL at 17:37

## 2017-04-23 RX ADMIN — RISPERIDONE 1 MG: 1 TABLET ORAL at 21:32

## 2017-04-23 RX ADMIN — LISINOPRIL 30 MG: 20 TABLET ORAL at 08:35

## 2017-04-23 RX ADMIN — LABETALOL HCL 200 MG: 200 TABLET, FILM COATED ORAL at 21:32

## 2017-04-23 RX ADMIN — Medication 25 MG: at 21:33

## 2017-04-23 RX ADMIN — LABETALOL HCL 200 MG: 200 TABLET, FILM COATED ORAL at 08:35

## 2017-04-23 RX ADMIN — RANITIDINE HYDROCHLORIDE 150 MG: 150 TABLET, FILM COATED ORAL at 08:35

## 2017-04-23 ASSESSMENT — ACTIVITIES OF DAILY LIVING (ADL)
GROOMING: INDEPENDENT
LAUNDRY: WITH SUPERVISION
DRESS: INDEPENDENT
LAUNDRY: WITH SUPERVISION
GROOMING: INDEPENDENT
ORAL_HYGIENE: INDEPENDENT
DRESS: INDEPENDENT
ORAL_HYGIENE: INDEPENDENT

## 2017-04-23 NOTE — PROGRESS NOTES
"INITIAL O.T. ASSESSMENT: Bryon has attended 9 OT sessions since admission. Engaged and sociable. Somewhat distractible. During groups he describes his primary challenges as \"all drug related.\" Describes himself as a \"person with a whole lot going for [himself]\" however he \"overdoes things... whether it's work or partying, [he] loves excess.\"\"     OT staff explained the purpose of pt being involved in current treatment plan.      Plan: Encourage ongoing attendance as able to meet self-stated goals, implement positive coping skills, engage in graded opportunities for success, and provide assessment ongoing.       04/23/17 1400   Clinical Impression   Affect Appropriate to situation   Orientation Oriented to person, place and time   Appearance and ADLs General cleanliness observed in most areas   Attention to Internal Stimuli No observed signs   Interaction Skills Initiates appropriately with staff;Initiates appropriately with peers   Ability to Communicate Needs Independent   Verbal Content Articulate;Clear   Ability to Maintain Boundaries Maintains appropriate physical boundaries;Maintains appropriate verbal boundaries   Participation Independently participates   Concentration Concentrates 20-30 minutes   Ability to Concentrate With structure;Easily distracted   Follows and Comprehends Directions Independently follows multi-step directions   Memory Delayed and immediate recall intact   Organization Independently organizes medium tasks   Decision Making Independent   Planning and Problem Solving Independently plans ahead;Impulsive   Ability to Apply and Learn Concepts Applies within group structure   Frustrations / Stress Tolerance Independently identifies sources of frustration/stress;Utilizes coping skills with prompts   Level of Insight Identifies needs with structure/support   Self Esteem Can identify positives;Accepts positive feedback   Social Supports Has knowledge of support systems     "

## 2017-04-23 NOTE — PLAN OF CARE
"Problem: Substance Withdrawal  Goal: Social and Therapeutic (Substance Withdrawal)  Signs and symptoms of listed problems will be absent or manageable.   Outcome: Improving  Client agreed to allow the last adult profile questions. Nursing admission is complete. He was asked if he has any questions. He asked for more information about zyprexa. He denies any psychosis at this time. This nurse explained that all antipsychotics have some side effects. He said then that he is not interested in zyprexa. \"I just wanted something to relax me.\" He participated in community meeting. Has been visible in milieu, chatting with peers and watching TV.      "

## 2017-04-23 NOTE — PROGRESS NOTES
Pt was visible in the milieu, spent more time awake then usual. Pt spent a large portion of time on the phone this morning. Pt's affect was bright and he attended an OT group where he participated and interacted with peers. Pt denies SI/SIB and hallucinations.      04/23/17 1400   Behavioral Health   Hallucinations denies / not responding to hallucinations   Thinking intact   Orientation person: oriented;place: oriented;date: oriented;time: oriented   Memory baseline memory   Insight insight appropriate to situation   Judgement impaired   Eye Contact at examiner   Affect full range affect   Mood mood is calm   Physical Appearance/Attire appears younger than stated age   Hygiene well groomed   Suicidality other (see comments)  (denies)   Self Injury other (see comment)  (denies)   Activity other (see comment)  (visible in milieu )   Speech clear;coherent   Medication Sensitivity no stated side effects;no observed side effects   Psychomotor / Gait balanced;steady   Psycho Education   Type of Intervention 1:1 intervention   Response participates, initiates socially appropriate   Hours 0.5   Activities of Daily Living   Hygiene/Grooming independent   Oral Hygiene independent   Dress independent   Laundry with supervision   Room Organization independent   Activity   Activity Level of Assistance independent

## 2017-04-23 NOTE — PROGRESS NOTES
Bryon approached this nurse for a request to be tested for gout. Says his great toe (right?) is causing him pain in the joint. Referred this to on call resident, Dr. Najera. She says internal medicine has seen him and will refer to internal medicine.

## 2017-04-23 NOTE — PROGRESS NOTES
Brief Cross Cover Note: Writer paged by RN as pt reported that his R 1st toe is hurting and he was told earlier that this may be gout and would like a blood test for this. Pt has been evaluated by IM during this admission and writer will defer re-consult of IM to primary team. Pt was encouraged to continue to use acetaminophen as he is not currently prescribed NSAIDs due to his CKD-II.       Lena Najera, DO  PGY-2 Psychiatry Resident

## 2017-04-24 PROCEDURE — 25000132 ZZH RX MED GY IP 250 OP 250 PS 637: Performed by: PSYCHIATRY & NEUROLOGY

## 2017-04-24 PROCEDURE — 25000132 ZZH RX MED GY IP 250 OP 250 PS 637: Performed by: STUDENT IN AN ORGANIZED HEALTH CARE EDUCATION/TRAINING PROGRAM

## 2017-04-24 PROCEDURE — 97150 GROUP THERAPEUTIC PROCEDURES: CPT | Mod: GO

## 2017-04-24 PROCEDURE — 12400001 ZZH R&B MH UMMC

## 2017-04-24 PROCEDURE — 99232 SBSQ HOSP IP/OBS MODERATE 35: CPT | Performed by: PHYSICIAN ASSISTANT

## 2017-04-24 PROCEDURE — 90853 GROUP PSYCHOTHERAPY: CPT

## 2017-04-24 PROCEDURE — 25000132 ZZH RX MED GY IP 250 OP 250 PS 637: Performed by: PHYSICIAN ASSISTANT

## 2017-04-24 PROCEDURE — 99232 SBSQ HOSP IP/OBS MODERATE 35: CPT | Mod: GC | Performed by: PSYCHIATRY & NEUROLOGY

## 2017-04-24 RX ORDER — LISINOPRIL 20 MG/1
40 TABLET ORAL DAILY
Status: DISCONTINUED | OUTPATIENT
Start: 2017-04-25 | End: 2017-05-08 | Stop reason: HOSPADM

## 2017-04-24 RX ORDER — IBUPROFEN 200 MG
400 TABLET ORAL EVERY 6 HOURS PRN
Status: ACTIVE | OUTPATIENT
Start: 2017-04-24 | End: 2017-04-26

## 2017-04-24 RX ADMIN — LABETALOL HCL 200 MG: 200 TABLET, FILM COATED ORAL at 08:39

## 2017-04-24 RX ADMIN — ACETAMINOPHEN 650 MG: 325 TABLET, FILM COATED ORAL at 08:37

## 2017-04-24 RX ADMIN — RISPERIDONE 1 MG: 1 TABLET ORAL at 21:31

## 2017-04-24 RX ADMIN — RANITIDINE HYDROCHLORIDE 150 MG: 150 TABLET, FILM COATED ORAL at 17:22

## 2017-04-24 RX ADMIN — ALBUTEROL SULFATE 2 PUFF: 90 AEROSOL, METERED RESPIRATORY (INHALATION) at 19:48

## 2017-04-24 RX ADMIN — ATORVASTATIN CALCIUM 10 MG: 10 TABLET, FILM COATED ORAL at 21:31

## 2017-04-24 RX ADMIN — ASPIRIN 81 MG CHEWABLE TABLET 81 MG: 81 TABLET CHEWABLE at 08:38

## 2017-04-24 RX ADMIN — Medication 25 MG: at 21:33

## 2017-04-24 RX ADMIN — LISINOPRIL 30 MG: 20 TABLET ORAL at 08:38

## 2017-04-24 RX ADMIN — RANITIDINE HYDROCHLORIDE 150 MG: 150 TABLET, FILM COATED ORAL at 08:38

## 2017-04-24 ASSESSMENT — ACTIVITIES OF DAILY LIVING (ADL)
LAUNDRY: WITH SUPERVISION
DRESS: INDEPENDENT
GROOMING: INDEPENDENT
GROOMING: INDEPENDENT
ORAL_HYGIENE: INDEPENDENT
DRESS: SCRUBS (BEHAVIORAL HEALTH);INDEPENDENT
ORAL_HYGIENE: INDEPENDENT

## 2017-04-24 NOTE — PROGRESS NOTES
Patient's petition for commitment has been supported and patient is awaiting hearing dates. Writer spoke to Karin ([phone]) a [facility] attorney office who states that the court date should be in place within a few days.

## 2017-04-24 NOTE — PROGRESS NOTES
"   04/23/17 2000   Behavioral Health   Hallucinations denies / not responding to hallucinations   Thinking intact   Orientation person: oriented;place: oriented;date: oriented   Memory baseline memory   Insight admits / accepts;insight appropriate to situation   Judgement intact   Eye Contact at examiner   Affect full range affect   Mood mood is calm   Physical Appearance/Attire neat   Hygiene well groomed   Suicidality other (see comments)  (denies)   Self Injury other (see comment)  (denies )   Activity other (see comment)  (attended group and socialized with others )   Speech clear;coherent   Activities of Daily Living   Hygiene/Grooming independent   Oral Hygiene independent   Dress independent   Laundry with supervision   Room Organization independent       Patient denies SI and SIB \" It's not about suicide it's about life my thoughts and how to get back on the right track.\".  Patient seems calm, smiling, very polite, visible in the milieu, attended group and socialized with others. Patient reported feeling \" I'm in the reflection It's not about feeling good or bad I'm just taking it all in and learning from it.  Patient daily goal \" reflect take it as it come.\"  Patient goal after discharge \"  Stay on the right track I just got to keep myself on the right track.\"  Patient is independent with ADL's.  Patient reported no nutritional concerns.  Patient wants visitors.    "

## 2017-04-24 NOTE — PLAN OF CARE
Problem: Depressive Symptoms  Goal: Depressive Symptoms  Signs and symptoms of listed problems will be absent or manageable.  -pt will remain safe without any self harm during hospitalization.  -pt will have decreased thoughts of self harm and or suicidal ideation.  -pt will report improved sleep.  -pt will have adequate daily oral intake.  -pt will have improvement in self care and person hygiene.  -pt will spend time out of their room.  -pt will express decrease feelings of hopelessness.  Outcome: Improving  Bryon in and out of milieu-freq napping-partial grp attendance-pleasant in interactions waiting CD tx

## 2017-04-24 NOTE — PROGRESS NOTES
"     ----------------------------------------------------------------------------------------------------------  [facility], [place]   Psychiatric Progress Note     Assessment    Presentation: Bryon Alonzo is a 32 year old male with a history of substance use disorder (crack, cocaine), Bipolar II, who presents with cocaine and crack, stating \"I wanted to use more and more and didn't care if I  because of it\". He was discharged 17 for a similar hospitalization. The discharge plan three days ago was for Bryon to take a cab to get a Rule 25 assessment. He expressed \" I like cocaine and the way it makes me feel\" and that \"the moment I got in that cab to my Rule 25, I decided 'no'\". Over the past three days he reports of spending $4000 on 4-5oz of crack and cocaine. He states that \"I haven't slept or been sober since the moment I left the hospital\". He also has not taken any of his medications during this time. He decided to return to the hospital today because the \"demon inside me has been wasted to nothing\". He thinks this hospitalization will be different from the last because he is \"ready to be clean and go to treatment\".     Diagnostic Impression: Patient has a long history of cocaine /crack use disorder. He gets suicidal while on cocaine and after a brief hospitalization and stabilization , restarts the use. Patient is diagnosed with cocaine use disorder. Diagnosis of bipolar II is questionable and the impact of possible Bipolar disorder on his current clinical picture is not known.      Hospital course: Bryon Alonzo was admitted to station 22 as a voluntary patient and PTA meds restarted. He stated that he never took the medications after discharge. He agreed with team on petitioning for commitment and expressed his wish to get into a  treatment facility. WIthin 2 days he was denying, and did not appear to be objectively experiencing, hallucinations, paranoia and " SI. He insisted that he now could be trusted and blamed the treatment team for recommending [facility] if we really had a better plan for him, when in fact, other options were quite limited. He was flippant and also portrayed him self as powerless to resist crack use because of the large amount of money he has possession of. He continued to express desire to get CD treatment throughout his hospitalization.     Medical course Patient with history of HTN, was hypertensive at unit and the BP readings are downtrending. He also initially expressed some chest pain at ED when the EKG and Troponin were obtained and were unchanged from past. At the unit patient did not report any chest pain. He had some shortness of breath for which he was given albuterol (helped). On 04/19, he noted lower right quadrant abdominal pain that is constant and sharp in nature and worsens with palpation. He was given Tylenol. The pain was not a concern the following day. On 04/23, he mentioned persistent pain in his right great toe in the 1st MTP joint region, medicine was consulted with concern for possible gout or previous injury.     Plan      Principal Diagnosis:   #Cocaine/crack intoxication and use disorder  #Possible cyclothymia or BP II disorder  - withdrawal precautions, as might be drinking too  - petitioning process for MICD commitment (drug-induced cardiomyopathy, CKD, nonadherent to entering treatment)  - screener has assessed, now awaiting MICD treatment placement      Medications:   Continue:   -Risperidone 1mg QHS  -Hydroxyzine 25-50 PRN for anxiety  -Olanzapine for emergency  -Trazodone PRN for sleep  -Folic Acid  -Thiamine   -Multivitamin      Laboratory/Imaging: Admission labs were unremarkable/unchanged from past.       Consults:   Internal Medicine - recs appreciated, signed off 04/19. Re-consulted on 04/23.      Patient will be treated in therapeutic milieu with appropriate individual and group therapies as  described.      Medical diagnoses to be addressed this admission:    # h/o non-ischemic cardiomyopathy: Patient reports history of chest pain, now respolved. Troponin I chronically elevated , unchanged from base line. EKG NSR. Medicine recommends not rechecking troponin levels.  - ASA tab 81 mg daily     # HTN/CKD: goal BP <130/90: BP has been consistently elevated to 150s/90s. On 04/23, BP readings quite labile. Occasional readings within normal range, but there are also peak recordings in 180s/100s.  - Labetalol tab 200 mg BID  - Lisinopril tab 30 mg daily  - could consider restarting amlodipine if BP persistently elevated, per medicine rec  - internal medicine consult  - avoid nephrotoxins  - f/u with outpatient nephrologist (scheduled)     #hyperlipidemia: results suggestive of hyperlipidemia. Patient previously on Atorvastatin, unclear why this was stopped. Has been restarted this hospitalization.  - Atorvastatin 10mg    #new onset RLQ abdominal pain, resolved: Patient reports began 04/18 in the afternoon. It is sharp and constant in nature, worsens to palpation. This is a new pain that he has not experienced before. No bowel/bladder symptoms. Nothing is noted to make it better or worse. First dose of Tylenol did not alleviate the pain. No history of abdominal surgery. CBC on 04/17 showed mildly elevated WBC at 11.6. Patient appears comfortable. No mention of pain on 04/20.  - Tylenol 650mg PO q4h PRN  - continue to monitor for improvement, low threshold for alerting medicine and drawing repeat CBC    # asthma  - albuterol PRN      #GERD:  - continue Zantac BID        Consults: Internal medicine, appreciate recs. Signed off 04/19/17. Re-consulted 04/23.      Legal Status: Voluntary      Safety Assessment:   Checks: Status 15  Precautions: substance withdrawal, suicide  Pt has not required locked seclusion or restraints in the past 24 hours to maintain safety, please refer to RN documentation for further  "details.    The risks, benefits, alternatives and side effects have been discussed and are understood by the patient and other caregivers.     Anticipated Disposition/Discharge Date: Unknown at this time. Pending further clinical evaluation and stabilization.     Scribed by Jyoti Hough, MS3, for Dr. Melissa Jimenez, PGY1, on 04/24/17 at 1:20pm.    Attestation:  I have reviewed and edited the documentation recorded by the scribe. This documentation accurately reflects the services I personally performed and treatment decisions made by me in consultation with the attending physician.     Melissa Jimenez MD  Psychiatry PGY1    Psychiatry Attending Attestation:  This patient has been seen and evaluated by me, Fernando Crawford M.D.  The patient's condition and treatment plan were discussed with the resident, and care coordinated with the CTC and RN. I reviewed, edited and agree with the findings and plan in this note.     Fernando Crawford M.D.   of Psychiatry    Interim History:   The patient's care was discussed with the treatment team and chart notes were reviewed.     Sleep: 6.5 hr  Vitals: BP labile, hypertensive to 170s/100s but some BP measurements within normal limits. Remaining vitals within normal limits.  Meds: compliant. PRNs given in past 24hr: trazodone x1  Staff note: reviewed     Subjective:  Bryon reports his mood as \"good\" again today and that he continues to sleep well. His main concern is pain in his right great toe. He rates it at 8/10 and says that he can move it but the pain is exacerbated with movement. He says this pain has been present since right around the time he entered the hospital, and it is worsening with time. He is hoping to have it assessed, as he is concerned about the possibility of gout. He has never had gout before. Additionally, we spoke about his continued hypertension. He says that if additional medicine is warranted, he would prefer to increase the " "dose of lisinopril rather than add on another drug (medicine had previously mentioned restarting amlodipine). Bryon says he has been on higher doses of lisinopril in the past.    Bryon's other concern is about his MICD treatment placement. He would like to \"get the ball rolling\" and hopes to figure out where he can be placed and how long the wait list is. He has no other concerns at this time.       Review of systems:      The Review of Systems is negative other than noted above     Medications:    Scheduled:    atorvastatin  10 mg Oral Daily     labetalol  200 mg Oral BID     lisinopril (PRINIVIL/ZESTRIL) tablet 30 mg  30 mg Oral Daily     risperiDONE (risperDAL) tablet 1 mg  1 mg Oral At Bedtime     ranitidine  150 mg Oral BID     aspirin  81 mg Oral Daily        PRN:  acetaminophen, albuterol, EPINEPHrine, hydrOXYzine, traZODone, potassium chloride, potassium chloride, albuterol     Allergies:     Allergies   Allergen Reactions     Banana Anaphylaxis         Psychiatric Examination:   Vital signs:  Temp: 97.7  F (36.5  C)   BP: 155/88 Pulse: 70   Resp: 16         Weight: 104.1 kg (229 lb 8 oz)  Estimated body mass index is 37.04 kg/(m^2) as calculated from the following:    Height as of 4/12/17: 1.676 m (5' 6\").    Weight as of this encounter: 104.1 kg (229 lb 8 oz).     Appearance: awake, alert, adequately groomed and dressed in hospital scrubs  Attitude: cooperative  Eye Contact: good  Mood: \"good\"  Affect: mood congruent  Speech: clear, coherent  Psychomotor Behavior: no evidence of tardive dyskinesia, dystonia, or tics  Thought Process: logical, linear and goal oriented   Associations: no loose associations  Thought Content: no evidence of suicidal ideation or homicidal ideation, no evidence of psychotic thought, no auditory hallucinations present and no visual hallucinations present  Insight: good  Judgment: limited  Oriented to: time, person, and place  Attention Span and Concentration: intact  Recent " and Remote Memory: intact  Language: Able to name objects, Able to repeat phrases and Able to read and write  Fund of Knowledge: appropriate  Muscle Strength and Tone: normal  Gait and Station: Normal     Labs:   No new lab results.  No labs pending.      Antipsychotic Labs:        Recent Labs   Lab Test 04/13/17  0723 08/19/16  0718 05/25/16  0733   CHOL 213* 274* 177   TRIG 179* 181* 151*   * 183* 98   HDL 75 55 49            Recent Labs   Lab Test 04/17/17  0501 04/12/17  0414 09/21/16  0711   * 132* 80            Recent Labs   Lab Test 04/17/17  0501 04/12/17  0414 09/05/16  0926   WBC 11.6* 11.3* 8.8   ANEU 7.2 9.0* 5.9   HGB 13.2* 13.4 12.9*   * 395 329

## 2017-04-24 NOTE — PROGRESS NOTES
Brief medicine consult note:  - Pt c/o acute R great toe pain since admission. Denies trauma. Wt bearing. Denies pain elsewhere. Taking Tylenol with modest relief. Denies fever and chills. Regarding his BP, thinks he was being prescribed both a 10 mg and 30 mg tab of [place] [facility] [place] Currently denies HA, CP and SOB.    GEN: Relatively healthy appearing young man in NAD. Conversant and pleasant  Blood pressure 155/88, pulse 70, temperature 97.7  F (36.5  C), resp. rate 16, weight 104.1 kg (229 lb 8 oz), SpO2 100 %.  EXT: R great toe without tenderness, warmth, discoloration and lack of AROM. No change from non-painful L great toe.     ASSESSMENT:   1) Acute R great toe pain, stable and most likely 2/2 mild sprain vs muscle strain as exam normal  2) HTN, stable but not optimally controlled at this time. Unclear pre-admission antihypertensive regimen.     PLAN:  Cont prn Tylenol. Add low dose prn ibuprofen for a few days given his hx of CKD. No further med intervention indicated for his toe, unless pain gets worse or signs develop such as edema or lack of AROM. Regarding his BP, will clarify outpatient antihypertensive med regimen, and most likely increase lisinopril to 40 mg daily and continue with labetolol as ordered. Medicine will continue to follow pt's BPs peripherally. Please feel free to call with questions.       Saji Santo PA-C  Internal Medicine Hospitalist   [facility]  [phone]

## 2017-04-25 PROCEDURE — 99232 SBSQ HOSP IP/OBS MODERATE 35: CPT | Mod: GC | Performed by: PSYCHIATRY & NEUROLOGY

## 2017-04-25 PROCEDURE — 25000132 ZZH RX MED GY IP 250 OP 250 PS 637: Performed by: PSYCHIATRY & NEUROLOGY

## 2017-04-25 PROCEDURE — 25000132 ZZH RX MED GY IP 250 OP 250 PS 637: Performed by: STUDENT IN AN ORGANIZED HEALTH CARE EDUCATION/TRAINING PROGRAM

## 2017-04-25 PROCEDURE — 90853 GROUP PSYCHOTHERAPY: CPT

## 2017-04-25 PROCEDURE — 25000132 ZZH RX MED GY IP 250 OP 250 PS 637: Performed by: PHYSICIAN ASSISTANT

## 2017-04-25 PROCEDURE — 97150 GROUP THERAPEUTIC PROCEDURES: CPT | Mod: GO

## 2017-04-25 PROCEDURE — 12400001 ZZH R&B MH UMMC

## 2017-04-25 RX ADMIN — ATORVASTATIN CALCIUM 10 MG: 10 TABLET, FILM COATED ORAL at 21:17

## 2017-04-25 RX ADMIN — RANITIDINE HYDROCHLORIDE 150 MG: 150 TABLET, FILM COATED ORAL at 18:02

## 2017-04-25 RX ADMIN — LABETALOL HCL 200 MG: 200 TABLET, FILM COATED ORAL at 08:16

## 2017-04-25 RX ADMIN — LABETALOL HCL 200 MG: 200 TABLET, FILM COATED ORAL at 21:17

## 2017-04-25 RX ADMIN — RISPERIDONE 1 MG: 1 TABLET ORAL at 21:17

## 2017-04-25 RX ADMIN — LISINOPRIL 40 MG: 20 TABLET ORAL at 08:17

## 2017-04-25 RX ADMIN — RANITIDINE HYDROCHLORIDE 150 MG: 150 TABLET, FILM COATED ORAL at 08:17

## 2017-04-25 RX ADMIN — ASPIRIN 81 MG CHEWABLE TABLET 81 MG: 81 TABLET CHEWABLE at 08:17

## 2017-04-25 RX ADMIN — Medication 25 MG: at 21:17

## 2017-04-25 ASSESSMENT — ACTIVITIES OF DAILY LIVING (ADL)
DRESS: INDEPENDENT
ORAL_HYGIENE: INDEPENDENT
GROOMING: INDEPENDENT

## 2017-04-25 NOTE — PROGRESS NOTES
Placed call to on call resident, Dr. Cyr regarding Bryon's high BP (176/93) but low pulse (57). At this time, instructed to hold Labetalol. Parameters are now in place. Will recheck pulse and BP.

## 2017-04-25 NOTE — PROGRESS NOTES
Pt was in good spirits and was present within the milieu throughout the evening. Discussed plans with Pt regarding the future and Pt states that he is trying to get into a CD program and feels that all of his problems are related to chemical use and if he can get that under control it would like a reset on life. Pt seems enthusiastic about getting better although seems to lack as insight about MH issues. Pt denies si/sib. Denies medication sensitivity.        04/24/17 2149   Behavioral Health   Hallucinations denies / not responding to hallucinations   Thinking intact   Orientation person: oriented;place: oriented;date: oriented;time: oriented   Memory baseline memory   Insight insight appropriate to situation   Judgement impaired   Eye Contact at examiner   Affect full range affect   Mood grandiose;mood is calm   Physical Appearance/Attire attire appropriate to age and situation   Hygiene well groomed   Suicidality (denies)   Self Injury (denies)   Activity (active in milieu)   Speech clear;coherent   Medication Sensitivity no stated side effects   Psychomotor / Gait balanced;steady   Psycho Education   Type of Intervention 1:1 intervention   Response participates, initiates socially appropriate   Hours 0.5   Treatment Detail (check in)   Activities of Daily Living   Hygiene/Grooming independent   Oral Hygiene independent   Dress scrubs (behavioral health);independent   Room Organization independent   Activity   Activity Level of Assistance independent

## 2017-04-25 NOTE — PROGRESS NOTES
Patient is pleasant and approachable. Appetite good. Attending afternoon groups         04/25/17 1842   Behavioral Health   Thinking distractable   Orientation person: oriented;place: oriented;date: oriented;time: oriented   Memory baseline memory   Insight insight appropriate to situation;insight appropriate to events   Judgement impaired   Affect full range affect   Mood anxious;depressed   Physical Appearance/Attire attire appropriate to age and situation;neat   Hygiene (Fair)   Activity restless  (Visible, active and social.)   Speech clear;coherent   Safety   Suicidality status 15

## 2017-04-25 NOTE — PROGRESS NOTES
"     ----------------------------------------------------------------------------------------------------------  [facility], [place]   Psychiatric Progress Note     Assessment    Presentation: Bryon Alonzo is a 32 year old male with a history of substance use disorder (crack, cocaine), Bipolar II, who presents with cocaine and crack, stating \"I wanted to use more and more and didn't care if I  because of it\". He was discharged 17 for a similar hospitalization. The discharge plan three days ago was for Bryon to take a cab to get a Rule 25 assessment. He expressed \" I like cocaine and the way it makes me feel\" and that \"the moment I got in that cab to my Rule 25, I decided 'no'\". Over the past three days he reports of spending $4000 on 4-5oz of crack and cocaine. He states that \"I haven't slept or been sober since the moment I left the hospital\". He also has not taken any of his medications during this time. He decided to return to the hospital today because the \"demon inside me has been wasted to nothing\". He thinks this hospitalization will be different from the last because he is \"ready to be clean and go to treatment\".     Diagnostic Impression: Patient has a long history of cocaine /crack use disorder. He gets suicidal while on cocaine and after a brief hospitalization and stabilization , restarts the use. Patient is diagnosed with cocaine use disorder. Diagnosis of bipolar II is questionable and the impact of possible Bipolar disorder on his current clinical picture is not known.      Hospital course: Bryon Alonzo was admitted to station 22 as a voluntary patient and PTA meds restarted. He stated that he never took the medications after discharge. He agreed with team on petitioning for commitment and expressed his wish to get into a  treatment facility. WIthin 2 days he was denying, and did not appear to be objectively experiencing, hallucinations, paranoia and " SI. He insisted that he now could be trusted and blamed the treatment team for recommending [facility] if we really had a better plan for him, when in fact, other options were quite limited. He was flippant and also portrayed him self as powerless to resist crack use because of the large amount of money he has possession of. He continued to express desire to get CD treatment throughout his hospitalization. Petition for commitment supported by  on 04/24.     Medical course Patient with history of HTN, was hypertensive at unit and the BP readings are downtrending. He also initially expressed some chest pain at ED when the EKG and Troponin were obtained and were unchanged from past. At the unit patient did not report any chest pain. He had some shortness of breath for which he was given albuterol (helped). On 04/19, he noted lower right quadrant abdominal pain that is constant and sharp in nature and worsens with palpation. He was given Tylenol. The pain was not a concern the following day. On 04/23, he mentioned persistent pain in his right great toe in the 1st MTP joint region, medicine was consulted with concern for possible gout or previous injury.     Plan      Principal Diagnosis:   #Cocaine/crack intoxication and use disorder  #Possible cyclothymia or BP II disorder  - withdrawal precautions, as might be drinking too  - commitment petition supported by , now awaiting court date  - awaiting MICD treatment placement      Medications:   Continue:   -Risperidone 1mg QHS  -Hydroxyzine 25-50 PRN for anxiety  -Olanzapine for emergency  -Trazodone PRN for sleep  -Folic Acid  -Thiamine   -Multivitamin      Laboratory/Imaging: Admission labs were unremarkable/unchanged from past.       Consults:   Internal Medicine - recs appreciated, signed off 04/19. Re-consulted on 04/24, now following peripherally.      Patient will be treated in therapeutic milieu with appropriate individual and group therapies as  described.      Medical diagnoses to be addressed this admission:    # h/o non-ischemic cardiomyopathy: Patient reports history of chest pain, now respolved. Troponin I chronically elevated , unchanged from base line. EKG NSR. Medicine recommends not rechecking troponin levels.  - ASA tab 81 mg daily     # HTN/CKD: goal BP <130/90: BP has been consistently elevated to 150s/90s. On 04/23, BP readings quite labile. Occasional readings within normal range, but there are also peak recordings in 180s/100s.  - continue Labetalol 200 mg BID  - increase Lisinopril to 40 mg daily, per medicine rec  - internal medicine following BP peripherally  - avoid nephrotoxins  - f/u with outpatient nephrologist (scheduled)     #hyperlipidemia: results suggestive of hyperlipidemia. Patient previously on Atorvastatin, unclear why this was stopped. Has been restarted this hospitalization.  - Atorvastatin 10mg    #new onset RLQ abdominal pain, resolved: Patient reports began 04/18 in the afternoon. It is sharp and constant in nature, worsens to palpation. This is a new pain that he has not experienced before. No bowel/bladder symptoms. Nothing is noted to make it better or worse. First dose of Tylenol did not alleviate the pain. No history of abdominal surgery. CBC on 04/17 showed mildly elevated WBC at 11.6. Patient appears comfortable. No mention of pain on 04/20.  - Tylenol 650mg PO q4h PRN  - continue to monitor for improvement, low threshold for alerting medicine and drawing repeat CBC    # asthma  - albuterol PRN      #GERD:  - continue Zantac BID        Consults: Internal medicine, appreciate recs. Signed off 04/19/17. Re-consulted 04/23.      Legal Status: Voluntary      Safety Assessment:   Checks: Status 15  Precautions: substance withdrawal, suicide  Pt has not required locked seclusion or restraints in the past 24 hours to maintain safety, please refer to RN documentation for further details.    The risks, benefits,  "alternatives and side effects have been discussed and are understood by the patient and other caregivers.     Anticipated Disposition/Discharge Date: Unknown at this time. Pending further clinical evaluation and stabilization.     Scribed by Jyoti Hough, MS3, for Dr. Melissa Jimenez, PGY1, on 04/25/17 at 1:55pm.    Attestation:  I have reviewed and edited the documentation recorded by the scribe. This documentation accurately reflects the services I personally performed and treatment decisions made by me in consultation with the attending physician.     Melissa Jimenez MD  Psychiatry PGY1    Psychiatry Attending Attestation:  This patient has been seen and evaluated by me, Fernando Crawford M.D.  The patient's condition and treatment plan were discussed with the resident, and care coordinated with the CTC and RN. I reviewed, edited and agree with the findings and plan in this note.     Fernando Crawford M.D.   of Psychiatry  Interim History:   The patient's care was discussed with the treatment team and chart notes were reviewed.     Sleep: 6.5 hr  Vitals: BP labile, hypertensive to 170s/90s but some BP measurements within normal limits. Remaining vitals within normal limits.  Meds: compliant. Order to hold PM dose of labetalol. PRNs given in past 24hr: trazodone x1, tylenol 650mg x1, albuterol inhaler x1  Staff note: reviewed     Subjective:  Bryon says his mood is \"okay\" today. He reports he's still \"just waiting\" to hear about his court date. He's tired of waiting for other people to figure things out for him, so he says he'll try to call them himself to speed things along. In terms of his progress here in the hospital, he has no drug cravings. He says his use isn't typically triggered by cravings or stress; rather, he just \"decides when to party.\" He uses cocaine as a \"self-reward\" after accomplishing a task or doing well at work. 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 At that point, usually about two weeks after he first thinks about using, he starts to \"party.\" He says he's selfish, because he could be spending that time with his kids or working at his career. He also notes that he's a thrill-seeker and \"needs to find new thrills.\" He has started to think about his plan going forward after treatment. He has been talking about this with his mom who is his biggest supporter. He has decided that when he first \"decides to party,\" he will/should tell his mother or someone else who can help him to say no. He says, \"It's a simple thing to do, but I've never done it.\"    Review of systems:      The Review of Systems is negative other than noted above     Medications:    Scheduled:    lisinopril (PRINIVIL/ZESTRIL) tablet 40 mg  40 mg Oral Daily     atorvastatin  10 mg Oral Daily     labetalol  200 mg Oral BID     risperiDONE (risperDAL) tablet 1 mg  1 mg Oral At Bedtime     ranitidine  150 mg Oral BID     aspirin  81 mg Oral Daily        PRN:  ibuprofen, acetaminophen, albuterol, EPINEPHrine, hydrOXYzine, traZODone, potassium chloride, potassium chloride, albuterol     Allergies:     Allergies   Allergen Reactions     Banana Anaphylaxis         Psychiatric Examination:   Vital signs:  Temp: 97.6  F (36.4  C) Temp src: Tympanic BP: (!) 160/102 Pulse: 62   Resp: 16         Weight: 105.2 kg (232 lb)  Estimated body mass index is 37.45 kg/(m^2) as calculated from the following:    Height as of 4/12/17: 1.676 m (5' 6\").    Weight as of this encounter: 105.2 kg (232 lb).     Appearance: awake, alert, adequately groomed and dressed in hospital scrubs  Attitude: cooperative  Eye Contact: good  Mood: \"okay\"  Affect: mood congruent  Speech: clear, coherent  Psychomotor Behavior: no evidence of tardive dyskinesia, dystonia, or tics  Thought Process: logical, linear and goal oriented   Associations: no loose associations  Thought " Content: no evidence of suicidal ideation or homicidal ideation, no evidence of psychotic thought, no auditory hallucinations present and no visual hallucinations present  Insight: good  Judgment: limited  Oriented to: time, person, and place  Attention Span and Concentration: intact  Recent and Remote Memory: intact  Language: Able to name objects, Able to repeat phrases and Able to read and write  Fund of Knowledge: appropriate  Muscle Strength and Tone: normal  Gait and Station: normal     Labs:   No new lab results.  No labs pending.      Antipsychotic Labs:        Recent Labs   Lab Test 04/13/17  0723 08/19/16  0718 05/25/16  0733   CHOL 213* 274* 177   TRIG 179* 181* 151*   * 183* 98   HDL 75 55 49            Recent Labs   Lab Test 04/17/17  0501 04/12/17  0414 09/21/16  0711   * 132* 80            Recent Labs   Lab Test 04/17/17  0501 04/12/17  0414 09/05/16  0926   WBC 11.6* 11.3* 8.8   ANEU 7.2 9.0* 5.9   HGB 13.2* 13.4 12.9*   * 395 329

## 2017-04-26 PROCEDURE — 99232 SBSQ HOSP IP/OBS MODERATE 35: CPT | Mod: GC | Performed by: PSYCHIATRY & NEUROLOGY

## 2017-04-26 PROCEDURE — 25000132 ZZH RX MED GY IP 250 OP 250 PS 637: Performed by: STUDENT IN AN ORGANIZED HEALTH CARE EDUCATION/TRAINING PROGRAM

## 2017-04-26 PROCEDURE — 25000132 ZZH RX MED GY IP 250 OP 250 PS 637: Performed by: PHYSICIAN ASSISTANT

## 2017-04-26 PROCEDURE — 12400001 ZZH R&B MH UMMC

## 2017-04-26 PROCEDURE — 25000132 ZZH RX MED GY IP 250 OP 250 PS 637: Performed by: PSYCHIATRY & NEUROLOGY

## 2017-04-26 PROCEDURE — 97150 GROUP THERAPEUTIC PROCEDURES: CPT | Mod: GO

## 2017-04-26 PROCEDURE — 90853 GROUP PSYCHOTHERAPY: CPT

## 2017-04-26 RX ADMIN — LISINOPRIL 40 MG: 20 TABLET ORAL at 08:24

## 2017-04-26 RX ADMIN — RANITIDINE HYDROCHLORIDE 150 MG: 150 TABLET, FILM COATED ORAL at 08:23

## 2017-04-26 RX ADMIN — ASPIRIN 81 MG CHEWABLE TABLET 81 MG: 81 TABLET CHEWABLE at 08:23

## 2017-04-26 RX ADMIN — RANITIDINE HYDROCHLORIDE 150 MG: 150 TABLET, FILM COATED ORAL at 18:04

## 2017-04-26 RX ADMIN — ATORVASTATIN CALCIUM 10 MG: 10 TABLET, FILM COATED ORAL at 20:34

## 2017-04-26 RX ADMIN — RISPERIDONE 1 MG: 1 TABLET ORAL at 20:35

## 2017-04-26 RX ADMIN — LABETALOL HCL 200 MG: 200 TABLET, FILM COATED ORAL at 20:34

## 2017-04-26 RX ADMIN — LABETALOL HCL 200 MG: 200 TABLET, FILM COATED ORAL at 08:23

## 2017-04-26 RX ADMIN — Medication 25 MG: at 20:34

## 2017-04-26 ASSESSMENT — ACTIVITIES OF DAILY LIVING (ADL)
GROOMING: HANDWASHING;SHOWER;INDEPENDENT
LAUNDRY: WITH SUPERVISION
DRESS: STREET CLOTHES;INDEPENDENT
ORAL_HYGIENE: INDEPENDENT

## 2017-04-26 NOTE — PROGRESS NOTES
Pt was in milieu most of shift watching TV. Social with peers and staff. Pt denies any mental health symptoms (none observed). Pt hopeful to get placed in CD treatment soon. Shift otherwise unremarkable     04/25/17 2200   Behavioral Health   Hallucinations denies / not responding to hallucinations   Thinking intact   Orientation person: oriented;place: oriented;date: oriented;time: oriented   Memory baseline memory   Insight admits / accepts;insight appropriate to situation   Judgement intact   Eye Contact at examiner   Affect full range affect   Mood mood is calm   Physical Appearance/Attire attire appropriate to age and situation   Hygiene well groomed   Suicidality other (see comments)  (denies)   Self Injury other (see comment)  (denies)   Activity other (see comment)  (social in milieu)   Speech clear;coherent   Medication Sensitivity no stated side effects;no observed side effects   Psychomotor / Gait balanced;steady   Psycho Education   Type of Intervention 1:1 intervention   Response participates, initiates socially appropriate   Hours 0.5   Treatment Detail check in    Group Therapy Session   Group Attendance attended group session   Time Session Began 1700   Time Session Ended 1730   Total Time (minutes) 30   Group Type community   Patient Participation/Contribution cooperative with task   Activities of Daily Living   Hygiene/Grooming independent   Oral Hygiene independent   Dress independent   Room Organization independent   Activity   Activity Level of Assistance independent

## 2017-04-26 NOTE — PLAN OF CARE
Problem: Depressive Symptoms  Goal: Social and Therapeutic (Depression)  Signs and symptoms of listed problems will be absent or manageable.         Pt. Attended and actively participated in 2 of 2 scheduled OT sessions today. Initially Pt spent his time paging through a magazine and socializing with peers.  He then engaged in a group game. Pt was quick to engage in group game activity.  Demonstrated an understanding of the rules  and strategy of the game.  Appeared to enjoy the group interaction. Organized in his efforts.

## 2017-04-26 NOTE — PROGRESS NOTES
More visible this afternoon. Attending groups. Appetite good. Pleasant and approachable.       04/26/17 1122   Behavioral Health   Hallucinations denies / not responding to hallucinations   Thinking distractable;intact   Orientation person: oriented;place: oriented;date: oriented;time: oriented   Memory baseline memory   Insight poor   Judgement impaired   Eye Contact at examiner   Affect full range affect   Mood depressed   Physical Appearance/Attire attire appropriate to age and situation   Hygiene (Fair)   Activity isolative;withdrawn   Speech coherent;clear   Safety   Suicidality status 15

## 2017-04-26 NOTE — PROGRESS NOTES
"     ----------------------------------------------------------------------------------------------------------  [facility], [place]   Psychiatric Progress Note     Assessment    Presentation: Bryon Alonzo is a 32 year old male with a history of substance use disorder (crack, cocaine), Bipolar II, who presents with cocaine and crack, stating \"I wanted to use more and more and didn't care if I  because of it\". He was discharged 17 for a similar hospitalization. The discharge plan three days ago was for Bryon to take a cab to get a Rule 25 assessment. He expressed \" I like cocaine and the way it makes me feel\" and that \"the moment I got in that cab to my Rule 25, I decided 'no'\". Over the past three days he reports of spending $4000 on 4-5oz of crack and cocaine. He states that \"I haven't slept or been sober since the moment I left the hospital\". He also has not taken any of his medications during this time. He decided to return to the hospital today because the \"demon inside me has been wasted to nothing\". He thinks this hospitalization will be different from the last because he is \"ready to be clean and go to treatment\".     Diagnostic Impression: Patient has a long history of cocaine /crack use disorder. He gets suicidal while on cocaine and after a brief hospitalization and stabilization , restarts the use. Patient is diagnosed with cocaine use disorder. Diagnosis of bipolar II is questionable and the impact of possible Bipolar disorder on his current clinical picture is not known.      Hospital course: Bryon Alonzo was admitted to station 22 as a voluntary patient and PTA meds restarted. He stated that he never took the medications after discharge. He agreed with team on petitioning for commitment and expressed his wish to get into a  treatment facility. WIthin 2 days he was denying, and did not appear to be objectively experiencing, hallucinations, paranoia and " SI. He insisted that he now could be trusted and blamed the treatment team for recommending [facility] if we really had a better plan for him, when in fact, other options were quite limited. He was flippant and also portrayed him self as powerless to resist crack use because of the large amount of money he has possession of. He continued to express desire to get CD treatment throughout his hospitalization. Petition for commitment supported by  on 04/24.     Medical course Patient with history of HTN, was hypertensive at unit and the BP readings are downtrending. He also initially expressed some chest pain at ED when the EKG and Troponin were obtained and were unchanged from past. At the unit patient did not report any chest pain. He had some shortness of breath for which he was given albuterol (helped). On 04/19, he noted lower right quadrant abdominal pain that is constant and sharp in nature and worsens with palpation. He was given Tylenol. The pain was not a concern the following day. On 04/23, he mentioned persistent pain in his right great toe in the 1st MTP joint region, medicine was consulted and diagnosed sprain vs. strain. Recommended treatment with rest, ice, Tylenol.     Plan      Principal Diagnosis:   #Cocaine/crack intoxication and use disorder  #Possible cyclothymia or BP II disorder  - withdrawal precautions, as might be drinking too  - commitment petition supported by , now awaiting court date  - awaiting MICD treatment placement      Medications:   Continue:   -Risperidone 1mg QHS  -Hydroxyzine 25-50 PRN for anxiety  -Olanzapine for emergency  -Trazodone PRN for sleep  -Folic Acid  -Thiamine   -Multivitamin      Laboratory/Imaging: Admission labs were unremarkable/unchanged from past.       Consults:   Internal Medicine - recs appreciated, signed off 04/19. Re-consulted on 04/24, now following peripherally.      Patient will be treated in therapeutic milieu with appropriate  individual and group therapies as described.      Medical diagnoses to be addressed this admission:    # h/o non-ischemic cardiomyopathy: Patient reports history of chest pain, now respolved. Troponin I chronically elevated , unchanged from base line. EKG NSR. Medicine recommends not rechecking troponin levels.  - ASA tab 81 mg daily     # HTN/CKD: goal BP <130/90: BP has been consistently elevated to 150s/90s. On 04/23, BP readings quite labile. Occasional readings within normal range, but there are also peak recordings in 180s/100s.  - continue Labetalol 200 mg BID  - increase Lisinopril to 40 mg daily, per medicine rec  - internal medicine following BP peripherally  - avoid nephrotoxins  - f/u with outpatient nephrologist (scheduled)    #right great toe sprain/strain: internal medicine consulted, believes is sprain or strain. Recommend tylenol, ice, rest.  - tylenol PRN  - rest  - ice pack    #hyperlipidemia: results suggestive of hyperlipidemia. Patient previously on Atorvastatin, unclear why this was stopped. Has been restarted this hospitalization.  - Atorvastatin 10mg    #new onset RLQ abdominal pain, resolved: Patient reports began 04/18 in the afternoon. It is sharp and constant in nature, worsens to palpation. This is a new pain that he has not experienced before. No bowel/bladder symptoms. Nothing is noted to make it better or worse. First dose of Tylenol did not alleviate the pain. No history of abdominal surgery. CBC on 04/17 showed mildly elevated WBC at 11.6. Patient appears comfortable. No mention of pain on 04/20.  - Tylenol 650mg PO q4h PRN  - continue to monitor for improvement, low threshold for alerting medicine and drawing repeat CBC    # asthma  - albuterol PRN      #GERD:  - continue Zantac BID        Consults: Internal medicine, appreciate recs. Signed off 04/19/17. Re-consulted 04/23.      Legal Status: Voluntary      Safety Assessment:   Checks: Status 15  Precautions: substance  "withdrawal, suicide  Pt has not required locked seclusion or restraints in the past 24 hours to maintain safety, please refer to RN documentation for further details.    The risks, benefits, alternatives and side effects have been discussed and are understood by the patient and other caregivers.     Anticipated Disposition/Discharge Date: Unknown at this time. Pending further clinical evaluation and stabilization.     Scribed by Jyoti Hough, MS3, for Dr. Melissa Jimenez, PGY1, on 04/26/17 at 11:40am.    Attestation:  I have reviewed and edited the documentation recorded by the scribe. This documentation accurately reflects the services I personally performed and treatment decisions made by me in consultation with the attending physician.     Melissa Jimenez MD  Psychiatry PGY1    Psychiatry Attending Attestation:  This patient has been seen and evaluated by me, Fernando Crawford M.D.  The patient's condition and treatment plan were discussed with the resident, and care coordinated with the CTC and RN. I reviewed, edited and agree with the findings and plan in this note.     Fernando Crawford M.D.   of Psychiatry    Interim History:   The patient's care was discussed with the treatment team and chart notes were reviewed.     Sleep: 7 hr  Vitals: hypertensive to 160/102. Remaining vitals within normal limits.  Meds: compliant. PRNs given in past 24hr: trazodone x1  Staff note: reviewed     Subjective:  Bryon expresses his mood as \"good\" today. He lays in bed during our conversation. He continues to be somewhat frustrated with the wait for a court date. He does not mention his toe pain until prompted, but then he says that it feels the same as yesterday. He rates the pain at a constant 8 out of 10. He has not taken Tylenol in the past 24 hours. He says he quit taking it because it wasn't helping. He has not been doing anything else but does agree upon suggestion that he could try icing and " "resting his toe, as the medicine team believes it is a strain/sprain.    Review of systems:      The Review of Systems is negative other than noted above     Medications:    Scheduled:    lisinopril (PRINIVIL/ZESTRIL) tablet 40 mg  40 mg Oral Daily     atorvastatin  10 mg Oral Daily     labetalol  200 mg Oral BID     risperiDONE (risperDAL) tablet 1 mg  1 mg Oral At Bedtime     ranitidine  150 mg Oral BID     aspirin  81 mg Oral Daily       PRN:  ibuprofen, acetaminophen, albuterol, EPINEPHrine, hydrOXYzine, traZODone, potassium chloride, potassium chloride, albuterol    Allergies:     Allergies   Allergen Reactions     Banana Anaphylaxis         Psychiatric Examination:   Vital signs:  Temp: 98  F (36.7  C)   BP: (!) 153/100 Pulse: 68   Resp: 14         Weight: 105.2 kg (232 lb)  Estimated body mass index is 37.45 kg/(m^2) as calculated from the following:    Height as of 4/12/17: 1.676 m (5' 6\").    Weight as of this encounter: 105.2 kg (232 lb).    Appearance: awake, alert, adequately groomed and dressed in hospital scrubs  Attitude: cooperative  Eye Contact: good  Mood: \"good\"  Affect: mood congruent  Speech: clear, coherent  Psychomotor Behavior: no evidence of tardive dyskinesia, dystonia, or tics  Thought Process: logical, linear and goal oriented   Associations: no loose associations  Thought Content: no evidence of suicidal ideation or homicidal ideation, no evidence of psychotic thought, no auditory hallucinations present and no visual hallucinations present  Insight: good  Judgment: limited  Oriented to: time, person, and place  Attention Span and Concentration: intact  Recent and Remote Memory: intact  Language: Able to name objects, Able to repeat phrases and Able to read and write  Fund of Knowledge: appropriate  Muscle Strength and Tone: normal  Gait and Station: normal     Labs:   No new lab results.  No labs pending.      Antipsychotic Labs:        Recent Labs   Lab Test 04/13/17  0723 " 08/19/16  0718 05/25/16  0733   CHOL 213* 274* 177   TRIG 179* 181* 151*   * 183* 98   HDL 75 55 49            Recent Labs   Lab Test 04/17/17  0501 04/12/17  0414 09/21/16  0711   * 132* 80            Recent Labs   Lab Test 04/17/17  0501 04/12/17  0414 09/05/16  0926   WBC 11.6* 11.3* 8.8   ANEU 7.2 9.0* 5.9   HGB 13.2* 13.4 12.9*   * 395 329

## 2017-04-27 PROCEDURE — 99232 SBSQ HOSP IP/OBS MODERATE 35: CPT | Mod: GC | Performed by: PSYCHIATRY & NEUROLOGY

## 2017-04-27 PROCEDURE — 25000132 ZZH RX MED GY IP 250 OP 250 PS 637: Performed by: PHYSICIAN ASSISTANT

## 2017-04-27 PROCEDURE — 25000132 ZZH RX MED GY IP 250 OP 250 PS 637: Performed by: PSYCHIATRY & NEUROLOGY

## 2017-04-27 PROCEDURE — 12400001 ZZH R&B MH UMMC

## 2017-04-27 PROCEDURE — 97150 GROUP THERAPEUTIC PROCEDURES: CPT | Mod: GO

## 2017-04-27 PROCEDURE — 25000132 ZZH RX MED GY IP 250 OP 250 PS 637: Performed by: STUDENT IN AN ORGANIZED HEALTH CARE EDUCATION/TRAINING PROGRAM

## 2017-04-27 PROCEDURE — 90853 GROUP PSYCHOTHERAPY: CPT

## 2017-04-27 RX ORDER — AMLODIPINE BESYLATE 2.5 MG/1
5 TABLET ORAL DAILY
Status: DISCONTINUED | OUTPATIENT
Start: 2017-04-27 | End: 2017-04-30

## 2017-04-27 RX ADMIN — ALBUTEROL SULFATE 2 PUFF: 90 AEROSOL, METERED RESPIRATORY (INHALATION) at 14:11

## 2017-04-27 RX ADMIN — RISPERIDONE 1 MG: 1 TABLET ORAL at 21:53

## 2017-04-27 RX ADMIN — Medication 25 MG: at 21:54

## 2017-04-27 RX ADMIN — LABETALOL HCL 200 MG: 200 TABLET, FILM COATED ORAL at 21:53

## 2017-04-27 RX ADMIN — RANITIDINE HYDROCHLORIDE 150 MG: 150 TABLET, FILM COATED ORAL at 09:22

## 2017-04-27 RX ADMIN — RANITIDINE HYDROCHLORIDE 150 MG: 150 TABLET, FILM COATED ORAL at 17:49

## 2017-04-27 RX ADMIN — LABETALOL HCL 200 MG: 200 TABLET, FILM COATED ORAL at 09:21

## 2017-04-27 RX ADMIN — ASPIRIN 81 MG CHEWABLE TABLET 81 MG: 81 TABLET CHEWABLE at 09:20

## 2017-04-27 RX ADMIN — ATORVASTATIN CALCIUM 10 MG: 10 TABLET, FILM COATED ORAL at 21:53

## 2017-04-27 RX ADMIN — AMLODIPINE BESYLATE 5 MG: 2.5 TABLET ORAL at 11:00

## 2017-04-27 RX ADMIN — ALBUTEROL SULFATE 2 PUFF: 90 AEROSOL, METERED RESPIRATORY (INHALATION) at 01:55

## 2017-04-27 RX ADMIN — LISINOPRIL 40 MG: 20 TABLET ORAL at 09:21

## 2017-04-27 ASSESSMENT — ACTIVITIES OF DAILY LIVING (ADL)
ORAL_HYGIENE: INDEPENDENT
LAUNDRY: WITH SUPERVISION
HYGIENE/GROOMING: INDEPENDENT
ORAL_HYGIENE: INDEPENDENT
HYGIENE/GROOMING: INDEPENDENT
LAUNDRY: WITH SUPERVISION
DRESS: INDEPENDENT
DRESS: INDEPENDENT

## 2017-04-27 NOTE — PROGRESS NOTES
Bryon  attended 2 of 2 OT groups today, and has consistently attended groups all week. He presents as bright, relaxed, and sociable, with a good sense of humor.

## 2017-04-27 NOTE — PLAN OF CARE
Brief medicine note:  - Made aware by pt's primary team that pt's BPs still significantly elevated despite modest dose increase of lisinopril several days ago. Therefore will start amlodipine 5 mg daily and titrate dose upward to max dose of 10 mg daily if indicated. Medicine will continue to follow pt's BPs peripherally. Please feel free to call with questions.     Saji Santo PA-C  Internal Medicine Hospitalist   [facility]  [phone]

## 2017-04-27 NOTE — PROGRESS NOTES
Social in the milieu. Active particip in group. Pleasant and polite. Discussed plan for CD treatment and wanting to be successful.     04/27/17 1400   Behavioral Health   Hallucinations denies / not responding to hallucinations   Thinking intact   Orientation person: oriented;place: oriented;date: oriented;time: oriented   Memory baseline memory   Insight admits / accepts   Judgement intact   Eye Contact at examiner   Affect full range affect   Mood mood is calm   Physical Appearance/Attire attire appropriate to age and situation   Hygiene well groomed   Activity other (see comment)  (Social, active particip in group)   Speech clear;coherent   Psychomotor / Gait balanced;steady   Psycho Education   Type of Intervention structured groups   Response participates, initiates socially appropriate   Hours 0.5   Treatment Detail Community mtg   Activities of Daily Living   Hygiene/Grooming independent   Oral Hygiene independent   Dress independent   Laundry with supervision   Room Organization independent

## 2017-04-27 NOTE — PLAN OF CARE
Problem: General Plan of Care (Inpatient Behavioral)  Goal: Team Discussion  Team Plan:   BEHAVIORAL TEAM DISCUSSION     Continued Stay Criteria/Rationale: Commitment hearing   Plan: Patient has been scheduled for his commitment hearing on May 4th and will continue on the medication until that time.  Participants: Zuleika Gaines [facility], Namrata Sharpe RN,  Dr. Fernando Crawford MD, Poppy Atkins MD,  Dr. Melissa Jimenez MD, Jyoti Hough MS3,   Summary/Recommendation: Patient is agreeable to plan of attending court followed by placement in an MI/CD treatment facility. Patient had questions regarding how quickly placement could be set up after hearing and writer continues to work on placement options.  Progress: Improving

## 2017-04-27 NOTE — PROGRESS NOTES
"     ----------------------------------------------------------------------------------------------------------  [facility], [place]   Psychiatric Progress Note     Assessment    Presentation: Bryon Alonzo is a 32 year old male with a history of substance use disorder (crack, cocaine), Bipolar II, who presents with cocaine and crack, stating \"I wanted to use more and more and didn't care if I  because of it\". He was discharged 17 for a similar hospitalization. The discharge plan three days ago was for Bryon to take a cab to get a Rule 25 assessment. He expressed \" I like cocaine and the way it makes me feel\" and that \"the moment I got in that cab to my Rule 25, I decided 'no'\". Over the past three days he reports of spending $4000 on 4-5oz of crack and cocaine. He states that \"I haven't slept or been sober since the moment I left the hospital\". He also has not taken any of his medications during this time. He decided to return to the hospital today because the \"demon inside me has been wasted to nothing\". He thinks this hospitalization will be different from the last because he is \"ready to be clean and go to treatment\".     Diagnostic Impression: Patient has a long history of cocaine /crack use disorder. He gets suicidal while on cocaine and after a brief hospitalization and stabilization , restarts the use. Patient is diagnosed with cocaine use disorder. Diagnosis of bipolar II is questionable and the impact of possible Bipolar disorder on his current clinical picture is not known.      Hospital course: Bryon Alonzo was admitted to station 22 as a voluntary patient and PTA meds restarted. He stated that he never took the medications after discharge. He agreed with team on petitioning for commitment and expressed his wish to get into a  treatment facility. WIthin 2 days he was denying, and did not appear to be objectively experiencing, hallucinations, paranoia and " SI. He insisted that he now could be trusted and blamed the treatment team for recommending [facility] if we really had a better plan for him, when in fact, other options were quite limited. He was flippant and also portrayed him self as powerless to resist crack use because of the large amount of money he has possession of. He continued to express desire to get CD treatment throughout his hospitalization. Petition for commitment supported by  on 04/24.     Medical course Patient with history of HTN, was hypertensive at unit and the BP readings are downtrending. He also initially expressed some chest pain at ED when the EKG and Troponin were obtained and were unchanged from past. At the unit patient did not report any chest pain. He had some shortness of breath for which he was given albuterol (helped). On 04/19, he noted lower right quadrant abdominal pain that is constant and sharp in nature and worsens with palpation. He was given Tylenol. The pain was not a concern the following day. On 04/23, he mentioned persistent pain in his right great toe in the 1st MTP joint region, medicine was consulted and diagnosed sprain vs. strain. Recommended treatment with rest, ice, Tylenol.     Plan      Principal Diagnosis:   #Cocaine/crack intoxication and use disorder  #Possible cyclothymia or BP II disorder  - withdrawal precautions, as might be drinking too  - commitment petition supported by , court date scheduled for 05/04/17.  - awaiting MICD treatment placement, goal for treatment starting 05/05/17.      Medications:   Continue:   -Risperidone 1mg QHS  -Hydroxyzine 25-50 PRN for anxiety  -Olanzapine for emergency  -Trazodone PRN for sleep  -Folic Acid  -Thiamine   -Multivitamin      Laboratory/Imaging: Admission labs were unremarkable/unchanged from past.       Consults:   Internal Medicine - recs appreciated, signed off 04/19. Re-consulted on 04/24, now following peripherally.      Patient will be  treated in therapeutic milieu with appropriate individual and group therapies as described.      Medical diagnoses to be addressed this admission:    # h/o non-ischemic cardiomyopathy: Patient reports history of chest pain, now respolved. Troponin I chronically elevated , unchanged from base line. EKG NSR. Medicine recommends not rechecking troponin levels.  - ASA tab 81 mg daily     # HTN/CKD: goal BP <130/90: BP has been consistently elevated to 150s/90s. On 04/23, BP readings quite labile. Occasional readings within normal range, but there are also peak recordings in 180s/100s.  - continue Labetalol 200 mg BID  - increase Lisinopril to 40 mg daily, per medicine rec  - internal medicine following BP peripherally  - avoid nephrotoxins  - f/u with outpatient nephrologist (scheduled)    #right great toe sprain/strain: internal medicine consulted, believes is sprain or strain. Recommend tylenol, ice, rest.  - tylenol PRN  - rest  - ice pack    #hyperlipidemia: results suggestive of hyperlipidemia. Patient previously on Atorvastatin, unclear why this was stopped. Has been restarted this hospitalization.  - Atorvastatin 10mg    #new onset RLQ abdominal pain, resolved: Patient reports began 04/18 in the afternoon. It is sharp and constant in nature, worsens to palpation. This is a new pain that he has not experienced before. No bowel/bladder symptoms. Nothing is noted to make it better or worse. First dose of Tylenol did not alleviate the pain. No history of abdominal surgery. CBC on 04/17 showed mildly elevated WBC at 11.6. Patient appears comfortable. No mention of pain on 04/20.  - Tylenol 650mg PO q4h PRN  - continue to monitor for improvement, low threshold for alerting medicine and drawing repeat CBC    # asthma  - albuterol PRN      #GERD:  - continue Zantac BID        Consults: Internal medicine, appreciate recs. Signed off 04/19/17. Re-consulted 04/23.      Legal Status: Voluntary      Safety Assessment:    Checks: Status 15  Precautions: substance withdrawal, suicide  Pt has not required locked seclusion or restraints in the past 24 hours to maintain safety, please refer to RN documentation for further details.    The risks, benefits, alternatives and side effects have been discussed and are understood by the patient and other caregivers.     Anticipated Disposition/Discharge Date: Unknown at this time. Pending further clinical evaluation and stabilization.     Scribed by Jyoti Hough, MS3, for Dr. Melissa Jimenez, PGY1, on 04/27/17 at 12:15pm.    Attestation:  I have reviewed and edited the documentation recorded by the scribe. This documentation accurately reflects the services I personally performed and treatment decisions made by me in consultation with the attending physician.     Melissa Jimenez MD  Psychiatry PGY1    Psychiatry Attending Attestation:  This patient has been seen and evaluated by me, Fernando Crawford M.D.  The patient's condition and treatment plan were discussed with the resident, and care coordinated with the CTC and RN. I reviewed, edited and agree with the findings and plan in this note.     Fernando Crawford M.D.   of Psychiatry    Interim History:   The patient's care was discussed with the treatment team and chart notes were reviewed.     Sleep: 6 hr  Vitals: hypertensive to 186/107 without orthostatic features. Remaining vitals within normal limits.  Meds: compliant. PRNs given in past 24hr: trazodone x1, albuterol x1  Staff note: reviewed     Subjective:  Bryon is doing well today. He is happy to have received news about setting a court date of May 4, although he wishes it was sooner. Now he is looking ahead to finding a treatment facility that will be able to take him in reasonably soon after his court date. He says the place he was thinking about going to he is no longer considering because it will be at least one month before he can start. He has heard  "about a place in [place] that might be an option. He's going to try to get more information about this today. If that one doesn't work out, he says he'll \"just keep going down the list\" until he finds one that fits his timeline.    Bryon notes concern over his elevated blood pressure. He says problems began when he was around age 23 or 24 years old and attributes it to genetics and the fact that he \"didn't eat the best\" when he was younger. He's concerned about the blood pressure because it is related to his CKD and heart problems. He says that his partying is a contributor to the high blood pressure. IM paged and informed regarding patient's fluctuating HTN .     Review of systems:      The Review of Systems is negative other than noted above     Medications:    Scheduled:    lisinopril (PRINIVIL/ZESTRIL) tablet 40 mg  40 mg Oral Daily     atorvastatin  10 mg Oral Daily     labetalol  200 mg Oral BID     risperiDONE (risperDAL) tablet 1 mg  1 mg Oral At Bedtime     ranitidine  150 mg Oral BID     aspirin  81 mg Oral Daily       PRN:  acetaminophen, albuterol, EPINEPHrine, hydrOXYzine, traZODone, potassium chloride, potassium chloride, albuterol    Allergies:     Allergies   Allergen Reactions     Banana Anaphylaxis         Psychiatric Examination:   Vital signs:  Temp: 97.6  F (36.4  C) Temp src: Oral BP: (!) 157/101 Pulse: 61   Resp: 14         Weight: 105.2 kg (232 lb)  Estimated body mass index is 37.45 kg/(m^2) as calculated from the following:    Height as of 4/12/17: 1.676 m (5' 6\").    Weight as of this encounter: 105.2 kg (232 lb).    Appearance: awake, alert, adequately groomed and dressed in hospital scrubs  Attitude: cooperative  Eye Contact: good  Mood: \"good\"  Affect: mood congruent  Speech: clear, coherent  Psychomotor Behavior: no evidence of tardive dyskinesia, dystonia, or tics  Thought Process: logical, linear and goal oriented   Associations: no loose associations  Thought Content: no " evidence of suicidal ideation or homicidal ideation, no evidence of psychotic thought, no auditory hallucinations present and no visual hallucinations present  Insight: good  Judgment: limited  Oriented to: time, person, and place  Attention Span and Concentration: intact  Recent and Remote Memory: intact  Language: Able to name objects, Able to repeat phrases and Able to read and write  Fund of Knowledge: appropriate  Muscle Strength and Tone: normal  Gait and Station: normal     Labs:   No new lab results.  No labs pending.      Antipsychotic Labs:        Recent Labs   Lab Test 04/13/17  0723 08/19/16  0718 05/25/16  0733   CHOL 213* 274* 177   TRIG 179* 181* 151*   * 183* 98   HDL 75 55 49            Recent Labs   Lab Test 04/17/17  0501 04/12/17  0414 09/21/16  0711   * 132* 80            Recent Labs   Lab Test 04/17/17  0501 04/12/17  0414 09/05/16  0926   WBC 11.6* 11.3* 8.8   ANEU 7.2 9.0* 5.9   HGB 13.2* 13.4 12.9*   * 395 329

## 2017-04-28 PROCEDURE — 25000132 ZZH RX MED GY IP 250 OP 250 PS 637: Performed by: PSYCHIATRY & NEUROLOGY

## 2017-04-28 PROCEDURE — 25000132 ZZH RX MED GY IP 250 OP 250 PS 637: Performed by: PHYSICIAN ASSISTANT

## 2017-04-28 PROCEDURE — 99232 SBSQ HOSP IP/OBS MODERATE 35: CPT | Mod: GC | Performed by: PSYCHIATRY & NEUROLOGY

## 2017-04-28 PROCEDURE — 90853 GROUP PSYCHOTHERAPY: CPT

## 2017-04-28 PROCEDURE — 97150 GROUP THERAPEUTIC PROCEDURES: CPT | Mod: GO

## 2017-04-28 PROCEDURE — 25000132 ZZH RX MED GY IP 250 OP 250 PS 637: Performed by: STUDENT IN AN ORGANIZED HEALTH CARE EDUCATION/TRAINING PROGRAM

## 2017-04-28 PROCEDURE — 12400001 ZZH R&B MH UMMC

## 2017-04-28 RX ADMIN — RANITIDINE HYDROCHLORIDE 150 MG: 150 TABLET, FILM COATED ORAL at 15:47

## 2017-04-28 RX ADMIN — ALBUTEROL SULFATE 2 PUFF: 90 AEROSOL, METERED RESPIRATORY (INHALATION) at 16:07

## 2017-04-28 RX ADMIN — Medication 25 MG: at 21:17

## 2017-04-28 RX ADMIN — RISPERIDONE 1 MG: 1 TABLET ORAL at 21:15

## 2017-04-28 RX ADMIN — ALBUTEROL SULFATE 2 PUFF: 90 AEROSOL, METERED RESPIRATORY (INHALATION) at 21:24

## 2017-04-28 RX ADMIN — LISINOPRIL 40 MG: 20 TABLET ORAL at 07:49

## 2017-04-28 RX ADMIN — LABETALOL HCL 200 MG: 200 TABLET, FILM COATED ORAL at 07:49

## 2017-04-28 RX ADMIN — RANITIDINE HYDROCHLORIDE 150 MG: 150 TABLET, FILM COATED ORAL at 07:49

## 2017-04-28 RX ADMIN — AMLODIPINE BESYLATE 5 MG: 2.5 TABLET ORAL at 07:49

## 2017-04-28 RX ADMIN — ASPIRIN 81 MG CHEWABLE TABLET 81 MG: 81 TABLET CHEWABLE at 07:49

## 2017-04-28 RX ADMIN — ATORVASTATIN CALCIUM 10 MG: 10 TABLET, FILM COATED ORAL at 21:15

## 2017-04-28 RX ADMIN — LABETALOL HCL 200 MG: 200 TABLET, FILM COATED ORAL at 21:16

## 2017-04-28 ASSESSMENT — ACTIVITIES OF DAILY LIVING (ADL)
GROOMING: INDEPENDENT
ORAL_HYGIENE: INDEPENDENT
GROOMING: INDEPENDENT
DRESS: INDEPENDENT
ORAL_HYGIENE: INDEPENDENT
DRESS: SCRUBS (BEHAVIORAL HEALTH);INDEPENDENT

## 2017-04-28 NOTE — PLAN OF CARE
"Problem: General Plan of Care (Inpatient Behavioral)  Goal: Individualization/Patient Specific Goal (IP Behavioral)  The patient and/or their representative will achieve their patient-specific goals related to the plan of care.    The patient-specific goals include:   Illness Management Recovery model:  Taking Action.     Patient identified the following actions they can take when early warning signs are present in order to prevent relapse:     1. \"Focus my mind on other'positive' stuff\".  2. The most supportive person in patient's life is \"my mom\".   3. When patient is feeling really stressed he will go to his music or go shopping.   4. The first person patient calls when he is feeling unstable is \"my mom\".           "

## 2017-04-28 NOTE — PROGRESS NOTES
Social in the milieu, bright affect. Active particip in groups.  Ate meals.        04/27/17 1900   Behavioral Health   Hallucinations denies / not responding to hallucinations   Thinking intact   Orientation person: oriented;place: oriented;date: oriented;time: oriented   Memory baseline memory   Insight admits / accepts   Judgement intact   Eye Contact at examiner   Affect full range affect   Mood mood is calm   Physical Appearance/Attire attire appropriate to age and situation   Hygiene well groomed   Activity other (see comment)  (Social in the milieu)   Speech clear;coherent   Psychomotor / Gait balanced;steady   Psycho Education   Type of Intervention structured groups   Response participates, initiates socially appropriate   Hours 0.5   Treatment Detail Community mtg   Activities of Daily Living   Hygiene/Grooming independent   Oral Hygiene independent   Dress independent   Laundry with supervision   Room Organization independent

## 2017-04-28 NOTE — PROGRESS NOTES
"     ----------------------------------------------------------------------------------------------------------  [facility], [place], [place]   Psychiatric Progress Note     Assessment    Presentation: Bryon Alonzo is a 32 year old male with a history of substance use disorder (crack, cocaine), Bipolar II, who presents with cocaine and crack, stating \"I wanted to use more and more and didn't care if I  because of it\". He was discharged 17 for a similar hospitalization. The discharge plan three days ago was for Bryon to take a cab to get a Rule 25 assessment. He expressed \" I like cocaine and the way it makes me feel\" and that \"the moment I got in that cab to my Rule 25, I decided 'no'\". Over the past three days he reports of spending $4000 on 4-5oz of crack and cocaine. He states that \"I haven't slept or been sober since the moment I left the hospital\". He also has not taken any of his medications during this time. He decided to return to the hospital today because the \"demon inside me has been wasted to nothing\". He thinks this hospitalization will be different from the last because he is \"ready to be clean and go to treatment\".     Diagnostic Impression: Patient has a long history of cocaine /crack use disorder. He gets suicidal while on cocaine and after a brief hospitalization and stabilization , restarts the use. Patient is diagnosed with cocaine use disorder. Diagnosis of bipolar II is questionable and the impact of possible Bipolar disorder on his current clinical picture is not known.      Hospital course: Bryon Alonzo was admitted to station 22 as a voluntary patient and PTA meds restarted. He stated that he never took the medications after discharge. He agreed with team on petitioning for commitment and expressed his wish to get into a  treatment facility. WIthin 2 days he was denying, and did not appear to be objectively experiencing, hallucinations, paranoia and " SI. He insisted that he now could be trusted and blamed the treatment team for recommending [address] if we really had a better plan for him, when in fact, other options were quite limited. He was flippant and also portrayed him self as powerless to resist crack use because of the large amount of money he has possession of. He continued to express desire to get CD treatment throughout his hospitalization. Petition for commitment supported by  on 04/24.     Medical course Patient with history of HTN, was hypertensive at unit and the BP readings are downtrending. He also initially expressed some chest pain at ED when the EKG and Troponin were obtained and were unchanged from past. At the unit patient did not report any chest pain. He had some shortness of breath for which he was given albuterol (helped). On 04/19, he noted lower right quadrant abdominal pain that is constant and sharp in nature and worsens with palpation. He was given Tylenol. The pain was not a concern the following day. On 04/23, he mentioned persistent pain in his right great toe in the 1st MTP joint region, medicine was consulted and diagnosed sprain vs. strain. Recommended treatment with rest, ice, Tylenol. Toe pain noted to be improved on 04/28.     Plan      Principal Diagnosis:   #Cocaine/crack intoxication and use disorder  #Possible cyclothymia or BP II disorder  - withdrawal precautions, as may have been drinking too  - commitment petition supported by , court date scheduled for 05/04/17.  - awaiting MICD treatment placement, goal for treatment starting 05/05/17.      Medications:   Continue:   -Risperidone 1mg QHS  -Hydroxyzine 25-50 PRN for anxiety  -Olanzapine for emergency  -Trazodone PRN for sleep  -Folic Acid  -Thiamine   -Multivitamin      Laboratory/Imaging: Admission labs were unremarkable/unchanged from past.       Consults:   Internal Medicine - recs appreciated, signed off 04/19. Re-consulted on 04/24, now  following peripherally.      Patient will be treated in therapeutic milieu with appropriate individual and group therapies as described.      Medical diagnoses to be addressed this admission:    # h/o non-ischemic cardiomyopathy: Patient reports history of chest pain, now respolved. Troponin I chronically elevated , unchanged from base line. EKG NSR. Medicine recommends not rechecking troponin levels.  - ASA tab 81 mg daily     # HTN/CKD: goal BP <130/90: BP has been consistently elevated to 150s/90s. On 04/23, BP readings quite labile. Occasional readings within normal range, but there are also peak recordings in 180s/100s.  - continue Labetalol 200 mg BID  - continue Lisinopril to 40 mg daily  - start amlodipine 5mg daily, may titrate to 10mg in future per medicine rec  - internal medicine following BP peripherally  - avoid nephrotoxins  - f/u with outpatient nephrologist (scheduled)    #hyperlipidemia: results suggestive of hyperlipidemia. Patient previously on Atorvastatin, unclear why this was stopped. Has been restarted this hospitalization.  - Atorvastatin 10mg    # asthma  - albuterol PRN      #GERD:  - continue Zantac BID    #right great toe sprain/strain, resolved: internal medicine consulted, believes is sprain or strain. Recommend tylenol, ice, rest.  - tylenol PRN  - rest  - ice pack    #new onset RLQ abdominal pain, resolved: Patient reports began 04/18 in the afternoon. It is sharp and constant in nature, worsens to palpation. This is a new pain that he has not experienced before. No bowel/bladder symptoms. Nothing is noted to make it better or worse. First dose of Tylenol did not alleviate the pain. No history of abdominal surgery. CBC on 04/17 showed mildly elevated WBC at 11.6. Patient appears comfortable. No mention of pain on 04/20.  - Tylenol 650mg PO q4h PRN  - continue to monitor for improvement, low threshold for alerting medicine and drawing repeat CBC        Consults: Internal medicine,  appreciate recs. Signed off 04/19/17. Re-consulted 04/23.      Legal Status: Voluntary      Safety Assessment:   Checks: Status 15  Precautions: substance withdrawal, suicide  Pt has not required locked seclusion or restraints in the past 24 hours to maintain safety, please refer to RN documentation for further details.    The risks, benefits, alternatives and side effects have been discussed and are understood by the patient and other caregivers.     Anticipated Disposition/Discharge Date: Unknown at this time. Awaiting court ruling and MICD treatment placement.     Scribed by Jyoti Hough, MS3, for Dr. Melissa Jimenez, PGY1, on 04/28/17 at 4:05pm.    Attestation:  I have reviewed and edited the documentation recorded by the scribe. This documentation accurately reflects the services I personally performed and treatment decisions made by me in consultation with the attending physician.     Melissa Jimenez MD  Psychiatry PGY1    Psychiatry Attending Attestation:  This patient has been seen and evaluated by me, Fernando Crawford M.D.  The patient's condition and treatment plan were discussed with the resident, and care coordinated with the CTC and RN. I reviewed, edited and agree with the findings and plan in this note.     Fernando Crawford M.D.   of Psychiatry    Interim History:   The patient's care was discussed with the treatment team and chart notes were reviewed.     Sleep: 7 hr  Vitals: within normal limits.  Meds: compliant. PRNs given in past 24hr: trazodone x1, albuterol x2  Staff note: reviewed     Subjective:  Bryon talks about working toward finalizing MICD treatment placement. He says he has been making phone calls and believes there is a bed at [facility] available. He is making connections with his  as well as social work to try to finalize a plan, and he's hopeful they can hold a bed for him to start the day after his court date.    Bryon talks about his family  "today. He says he's been talking to his kids as well as his mom 2-3 times daily. He feels very supported by his family. He says they support him because they know that, \"When I'm sober, I shine.\" He says that if he's able to maintain his lifestyle changes and stay away from cocaine, \"It's going to be great.\" He notes that he needs to find more rewards to put in place of the times he would typically use cocaine. His plan for staying clean includes talking more to his mother and others especially when he feels the desire to party. He says they can remind him of his goal to stay sober and help to intervene. He says he won't get upset for saying these things to him because \"God talks to me through people.\" He also is open to trying new things suggested by Dr. Jimenez, including writing a letter to himself now while he is in a clear state of mind so that he can look at it when he feels weak. Also making a list of pros and cons related to cocaine use and thinking up other things that can fulfill the pro column. Also linking cocaine to the pain and suffering it causes in his life.    Bryon is agreeable to restarting the amlodipine that the internal medicine team put him on yesterday. He says he has taken this drug in the past without any adverse effects. He is really hoping this medicine helps regulate his blood pressure in a safer range, as he is quite concerned about his persistently elevated pressures. He has no adverse mediation effects today. He also notes his toe is feeling better.    Review of systems:      The Review of Systems is negative other than noted above     Medications:    Scheduled:    amLODIPine  5 mg Oral Daily     lisinopril (PRINIVIL/ZESTRIL) tablet 40 mg  40 mg Oral Daily     atorvastatin  10 mg Oral Daily     labetalol  200 mg Oral BID     risperiDONE (risperDAL) tablet 1 mg  1 mg Oral At Bedtime     ranitidine  150 mg Oral BID     aspirin  81 mg Oral Daily       PRN:  acetaminophen, albuterol, " "EPINEPHrine, hydrOXYzine, traZODone, potassium chloride, potassium chloride, albuterol    Allergies:     Allergies   Allergen Reactions     Banana Anaphylaxis         Psychiatric Examination:   Vital signs:  Temp: 96.6  F (35.9  C) Temp src: Oral       Resp: 16         Weight: 105.2 kg (232 lb)  Estimated body mass index is 37.45 kg/(m^2) as calculated from the following:    Height as of 4/12/17: 1.676 m (5' 6\").    Weight as of this encounter: 105.2 kg (232 lb).    Appearance: awake, alert, adequately groomed and dressed in hospital scrubs, proper use of jewlery  Attitude: cooperative  Eye Contact: good  Mood: \"good\"  Affect: mood congruent, bright, smiles appropriately  Speech: clear, coherent  Psychomotor Behavior: no evidence of tardive dyskinesia, dystonia, or tics  Thought Process: logical, linear and goal oriented   Associations: no loose associations  Thought Content: no evidence of suicidal ideation or homicidal ideation, no evidence of psychotic thought, no auditory hallucinations present and no visual hallucinations present  Insight: good  Judgment: limited  Oriented to: time, person, and place  Attention Span and Concentration: intact  Recent and Remote Memory: intact  Language: Able to name objects, Able to repeat phrases and Able to read and write  Fund of Knowledge: appropriate  Muscle Strength and Tone: normal  Gait and Station: normal     Labs:   No new lab results.  No labs pending.      Antipsychotic Labs:        Recent Labs   Lab Test 04/13/17  0723 08/19/16  0718 05/25/16  0733   CHOL 213* 274* 177   TRIG 179* 181* 151*   * 183* 98   HDL 75 55 49            Recent Labs   Lab Test 04/17/17  0501 04/12/17  0414 09/21/16  0711   * 132* 80            Recent Labs   Lab Test 04/17/17  0501 04/12/17  0414 09/05/16  0926   WBC 11.6* 11.3* 8.8   ANEU 7.2 9.0* 5.9   HGB 13.2* 13.4 12.9*   * 395 329              "

## 2017-04-28 NOTE — PLAN OF CARE
"Problem: Depressive Symptoms  Goal: Depressive Symptoms  Signs and symptoms of listed problems will be absent or manageable.  -pt will remain safe without any self harm during hospitalization.  -pt will have decreased thoughts of self harm and or suicidal ideation.  -pt will report improved sleep.  -pt will have adequate daily oral intake.  -pt will have improvement in self care and person hygiene.  -pt will spend time out of their room.  -pt will express decrease feelings of hopelessness.   Outcome: Improving  \"I'm better. I got some reflection time\". Denies depression and anxiety, both have improved during hospital stay. Thoughts are more clear and is able to focus and concentrate better. Attends all the groups and is social with peers.       "

## 2017-04-28 NOTE — PROGRESS NOTES
Behavioral Health  Note   Behavioral Health  Spirituality Group Note     Unit 22    Name: Bryon Alonzo    YOB: 1985   MRN: [id number]    Age: 32 year old     Patient attended -led group, which included discussion of spirituality, coping with illness and building resilience.   Patient attended group for 1.0 hrs.   The patient actively participated in group discussion     [facility]  Staff    Page [phone]

## 2017-04-29 PROCEDURE — 25000132 ZZH RX MED GY IP 250 OP 250 PS 637: Performed by: STUDENT IN AN ORGANIZED HEALTH CARE EDUCATION/TRAINING PROGRAM

## 2017-04-29 PROCEDURE — 25000132 ZZH RX MED GY IP 250 OP 250 PS 637: Performed by: PHYSICIAN ASSISTANT

## 2017-04-29 PROCEDURE — 25000132 ZZH RX MED GY IP 250 OP 250 PS 637: Performed by: PSYCHIATRY & NEUROLOGY

## 2017-04-29 PROCEDURE — 90853 GROUP PSYCHOTHERAPY: CPT

## 2017-04-29 PROCEDURE — 12400001 ZZH R&B MH UMMC

## 2017-04-29 RX ADMIN — RANITIDINE HYDROCHLORIDE 150 MG: 150 TABLET, FILM COATED ORAL at 17:55

## 2017-04-29 RX ADMIN — RISPERIDONE 1 MG: 1 TABLET ORAL at 20:46

## 2017-04-29 RX ADMIN — LISINOPRIL 40 MG: 20 TABLET ORAL at 08:41

## 2017-04-29 RX ADMIN — RANITIDINE HYDROCHLORIDE 150 MG: 150 TABLET, FILM COATED ORAL at 08:39

## 2017-04-29 RX ADMIN — AMLODIPINE BESYLATE 5 MG: 2.5 TABLET ORAL at 08:39

## 2017-04-29 RX ADMIN — Medication 25 MG: at 20:48

## 2017-04-29 RX ADMIN — ATORVASTATIN CALCIUM 10 MG: 10 TABLET, FILM COATED ORAL at 20:46

## 2017-04-29 RX ADMIN — ASPIRIN 81 MG CHEWABLE TABLET 81 MG: 81 TABLET CHEWABLE at 08:39

## 2017-04-29 RX ADMIN — LABETALOL HCL 200 MG: 200 TABLET, FILM COATED ORAL at 20:46

## 2017-04-29 RX ADMIN — LABETALOL HCL 200 MG: 200 TABLET, FILM COATED ORAL at 08:38

## 2017-04-29 ASSESSMENT — ACTIVITIES OF DAILY LIVING (ADL)
ORAL_HYGIENE: INDEPENDENT
DRESS: SCRUBS (BEHAVIORAL HEALTH);INDEPENDENT
GROOMING: INDEPENDENT

## 2017-04-29 NOTE — PROGRESS NOTES
More visible this afternoon. Pleasant and approachable. Appetite good. Watching tv for activity       04/29/17 1127   Behavioral Health   Hallucinations denies / not responding to hallucinations   Thinking intact   Orientation person: oriented;place: oriented;date: oriented;time: oriented   Memory baseline memory   Insight admits / accepts;insight appropriate to situation   Judgement impaired   Affect full range affect   Mood mood is calm   Physical Appearance/Attire attire appropriate to age and situation;neat   Hygiene (Patient showers daily)   Activity (Napping this morning)   Speech coherent;clear   Safety   Suicidality status 15

## 2017-04-29 NOTE — PROGRESS NOTES
"Pt was present and active within milieu all evening. Pt was pleasant and friendly with peers and staff. Discussed Pt's plans and he stated that he is looking forward to going to CD tx next Friday but has to wait for his court date next Thursday. States he is happy to be here as it's been good time to reflect and \"get his head together\" so he can be focused on CD tx when he goes and not trying to balance MH and CD issues at the same time. Pt states that his time here has been worth it because he feels much better since he came in. Denies si/sib. Denies any new or worsening sx.            04/28/17 2201   Behavioral Health   Hallucinations denies / not responding to hallucinations   Thinking intact   Orientation person: oriented;place: oriented;date: oriented;time: oriented   Memory baseline memory   Insight admits / accepts   Judgement intact   Eye Contact at examiner   Affect full range affect   Mood grandiose;mood is calm   Physical Appearance/Attire attire appropriate to age and situation   Hygiene well groomed   Suicidality (denies)   Self Injury (denies)   Activity (socially active)   Speech clear;coherent   Medication Sensitivity no stated side effects;no observed side effects   Psychomotor / Gait balanced;steady   Psycho Education   Type of Intervention 1:1 intervention   Response participates, initiates socially appropriate   Hours 0.5   Treatment Detail (check in)   Activities of Daily Living   Hygiene/Grooming independent   Oral Hygiene independent   Dress scrubs (behavioral health);independent   Room Organization independent     "

## 2017-04-30 PROCEDURE — 25000132 ZZH RX MED GY IP 250 OP 250 PS 637: Performed by: PHYSICIAN ASSISTANT

## 2017-04-30 PROCEDURE — 25000132 ZZH RX MED GY IP 250 OP 250 PS 637: Performed by: STUDENT IN AN ORGANIZED HEALTH CARE EDUCATION/TRAINING PROGRAM

## 2017-04-30 PROCEDURE — 12400001 ZZH R&B MH UMMC

## 2017-04-30 PROCEDURE — 25000132 ZZH RX MED GY IP 250 OP 250 PS 637: Performed by: PSYCHIATRY & NEUROLOGY

## 2017-04-30 RX ORDER — AMLODIPINE BESYLATE 2.5 MG/1
7.5 TABLET ORAL DAILY
Status: DISCONTINUED | OUTPATIENT
Start: 2017-05-01 | End: 2017-05-03

## 2017-04-30 RX ORDER — AMLODIPINE BESYLATE 2.5 MG/1
2.5 TABLET ORAL ONCE
Status: COMPLETED | OUTPATIENT
Start: 2017-04-30 | End: 2017-04-30

## 2017-04-30 RX ADMIN — LABETALOL HCL 200 MG: 200 TABLET, FILM COATED ORAL at 20:47

## 2017-04-30 RX ADMIN — ASPIRIN 81 MG CHEWABLE TABLET 81 MG: 81 TABLET CHEWABLE at 08:36

## 2017-04-30 RX ADMIN — RISPERIDONE 1 MG: 1 TABLET ORAL at 22:04

## 2017-04-30 RX ADMIN — LISINOPRIL 40 MG: 20 TABLET ORAL at 08:37

## 2017-04-30 RX ADMIN — ATORVASTATIN CALCIUM 10 MG: 10 TABLET, FILM COATED ORAL at 22:04

## 2017-04-30 RX ADMIN — AMLODIPINE BESYLATE 2.5 MG: 2.5 TABLET ORAL at 09:17

## 2017-04-30 RX ADMIN — ALBUTEROL SULFATE 2 PUFF: 90 AEROSOL, METERED RESPIRATORY (INHALATION) at 13:09

## 2017-04-30 RX ADMIN — AMLODIPINE BESYLATE 5 MG: 2.5 TABLET ORAL at 08:37

## 2017-04-30 RX ADMIN — RANITIDINE HYDROCHLORIDE 150 MG: 150 TABLET, FILM COATED ORAL at 08:37

## 2017-04-30 RX ADMIN — LABETALOL HCL 200 MG: 200 TABLET, FILM COATED ORAL at 08:36

## 2017-04-30 RX ADMIN — Medication 25 MG: at 22:04

## 2017-04-30 RX ADMIN — RANITIDINE HYDROCHLORIDE 150 MG: 150 TABLET, FILM COATED ORAL at 17:56

## 2017-04-30 ASSESSMENT — ACTIVITIES OF DAILY LIVING (ADL)
GROOMING: INDEPENDENT
LAUNDRY: WITH SUPERVISION
GROOMING: INDEPENDENT
DRESS: INDEPENDENT
ORAL_HYGIENE: INDEPENDENT
ORAL_HYGIENE: INDEPENDENT
LAUNDRY: WITH SUPERVISION
DRESS: INDEPENDENT

## 2017-04-30 NOTE — PROGRESS NOTES
No change from previous evening. Pt was present and active within milieu. Pt was social and friendly with peers and staff. Watched movies and socialized all evening. Pt continues to be excited about getting discharged next Friday. Feels that his time here has been well spent. Denies depression/anxiety sx. Denies si/sib.           04/29/17 2221   Behavioral Health   Hallucinations denies / not responding to hallucinations   Thinking intact   Orientation person: oriented;place: oriented;date: oriented;time: oriented   Memory baseline memory   Insight admits / accepts;insight appropriate to situation;insight appropriate to events   Judgement impaired   Eye Contact at examiner   Affect full range affect   Mood grandiose;mood is calm   Physical Appearance/Attire attire appropriate to age and situation   Hygiene well groomed   Suicidality (denies)   Self Injury (denies)   Activity (socially active)   Speech clear;coherent   Medication Sensitivity no stated side effects;no observed side effects   Psychomotor / Gait balanced;steady   Psycho Education   Type of Intervention 1:1 intervention   Response participates, initiates socially appropriate   Hours 0.5   Treatment Detail (check in)   Activities of Daily Living   Hygiene/Grooming independent   Oral Hygiene independent   Dress scrubs (behavioral health);independent   Room Organization independent

## 2017-04-30 NOTE — PLAN OF CARE
Problem: Depressive Symptoms  Goal: Depressive Symptoms  Signs and symptoms of listed problems will be absent or manageable.  -pt will remain safe without any self harm during hospitalization.  -pt will have decreased thoughts of self harm and or suicidal ideation.  -pt will report improved sleep.  -pt will have adequate daily oral intake.  -pt will have improvement in self care and person hygiene.  -pt will spend time out of their room.  -pt will express decrease feelings of hopelessness.   Outcome: Improving  Bryon active on unit-appropriately social with peers-mood euthymic-participating appropriately-states he feels hopeful he will discharge to tx program Friday-discussed additional Norvasc 2.5 mg added to AM dose today-spoke of importance of refraining from cocaine and other substance abuse-Bryon again stating commitment to lifestyle change

## 2017-05-01 PROCEDURE — 25000132 ZZH RX MED GY IP 250 OP 250 PS 637: Performed by: PHYSICIAN ASSISTANT

## 2017-05-01 PROCEDURE — 25000132 ZZH RX MED GY IP 250 OP 250 PS 637: Performed by: PSYCHIATRY & NEUROLOGY

## 2017-05-01 PROCEDURE — 99232 SBSQ HOSP IP/OBS MODERATE 35: CPT | Mod: GC | Performed by: PSYCHIATRY & NEUROLOGY

## 2017-05-01 PROCEDURE — 90853 GROUP PSYCHOTHERAPY: CPT

## 2017-05-01 PROCEDURE — 25000132 ZZH RX MED GY IP 250 OP 250 PS 637: Performed by: STUDENT IN AN ORGANIZED HEALTH CARE EDUCATION/TRAINING PROGRAM

## 2017-05-01 PROCEDURE — 97150 GROUP THERAPEUTIC PROCEDURES: CPT | Mod: GO

## 2017-05-01 PROCEDURE — 12400001 ZZH R&B MH UMMC

## 2017-05-01 RX ADMIN — ASPIRIN 81 MG CHEWABLE TABLET 81 MG: 81 TABLET CHEWABLE at 07:59

## 2017-05-01 RX ADMIN — RANITIDINE HYDROCHLORIDE 150 MG: 150 TABLET, FILM COATED ORAL at 15:55

## 2017-05-01 RX ADMIN — LISINOPRIL 40 MG: 20 TABLET ORAL at 08:00

## 2017-05-01 RX ADMIN — ATORVASTATIN CALCIUM 10 MG: 10 TABLET, FILM COATED ORAL at 21:36

## 2017-05-01 RX ADMIN — AMLODIPINE BESYLATE 7.5 MG: 2.5 TABLET ORAL at 07:59

## 2017-05-01 RX ADMIN — RANITIDINE HYDROCHLORIDE 150 MG: 150 TABLET, FILM COATED ORAL at 08:00

## 2017-05-01 RX ADMIN — LABETALOL HCL 200 MG: 200 TABLET, FILM COATED ORAL at 21:36

## 2017-05-01 RX ADMIN — LABETALOL HCL 200 MG: 200 TABLET, FILM COATED ORAL at 07:59

## 2017-05-01 RX ADMIN — Medication 25 MG: at 21:37

## 2017-05-01 RX ADMIN — RISPERIDONE 1 MG: 1 TABLET ORAL at 21:36

## 2017-05-01 ASSESSMENT — ACTIVITIES OF DAILY LIVING (ADL)
GROOMING: INDEPENDENT
DRESS: INDEPENDENT
LAUNDRY: WITH SUPERVISION
ORAL_HYGIENE: INDEPENDENT
GROOMING: INDEPENDENT
LAUNDRY: WITH SUPERVISION
ORAL_HYGIENE: INDEPENDENT
DRESS: INDEPENDENT

## 2017-05-01 NOTE — PROGRESS NOTES
Social in milieu, participated in group, and  in positive mood throughout shift. No mental health concerns states or observed. Pt is awaiting CD treatment. Writer talked with pt about strategies of staying sober outside of hospital.       04/30/17 2100   Behavioral Health   Hallucinations denies / not responding to hallucinations   Thinking intact   Orientation person: oriented;place: oriented;date: oriented;time: oriented   Memory baseline memory   Insight insight appropriate to situation;admits / accepts   Judgement intact   Eye Contact at examiner   Affect full range affect   Mood mood is calm   Physical Appearance/Attire attire appropriate to age and situation   Hygiene well groomed   Suicidality other (see comments)  (denies)   Self Injury other (see comment)  (denies)   Activity withdrawn   Speech clear;coherent   Medication Sensitivity no stated side effects;no observed side effects   Psychomotor / Gait balanced;steady   Psycho Education   Type of Intervention 1:1 intervention   Response participates, initiates socially appropriate   Hours 0.5   Treatment Detail check in    Group Therapy Session   Group Attendance attended group session   Time Session Began 1700   Time Session Ended 1730   Total Time (minutes) 30   Group Type community   Patient Participation/Contribution cooperative with task   Activities of Daily Living   Hygiene/Grooming independent   Oral Hygiene independent   Dress independent   Laundry with supervision   Room Organization independent   Activity   Activity Level of Assistance independent

## 2017-05-01 NOTE — PROGRESS NOTES
"     ----------------------------------------------------------------------------------------------------------  [facility], [place]   Psychiatric Progress Note     Assessment    Presentation: Bryon Alonzo is a 32 year old male with a history of substance use disorder (crack, cocaine), Bipolar II, who presents with cocaine and crack, stating \"I wanted to use more and more and didn't care if I  because of it\". He was discharged 17 for a similar hospitalization. The discharge plan three days ago was for Bryon to take a cab to get a Rule 25 assessment. He expressed \" I like cocaine and the way it makes me feel\" and that \"the moment I got in that cab to my Rule 25, I decided 'no'\". Over the past three days he reports of spending $4000 on 4-5oz of crack and cocaine. He states that \"I haven't slept or been sober since the moment I left the hospital\". He also has not taken any of his medications during this time. He decided to return to the hospital today because the \"demon inside me has been wasted to nothing\". He thinks this hospitalization will be different from the last because he is \"ready to be clean and go to treatment\".     Diagnostic Impression: Patient has a long history of cocaine /crack use disorder. He gets suicidal while on cocaine and after a brief hospitalization and stabilization , restarts the use. Patient is diagnosed with cocaine use disorder. Diagnosis of bipolar II is questionable and the impact of possible Bipolar disorder on his current clinical picture is not known.      Hospital course: Bryon Alonzo was admitted to station 22 as a voluntary patient and PTA meds restarted. He stated that he never took the medications after discharge. He agreed with team on petitioning for commitment and expressed his wish to get into a  treatment facility. WIthin 2 days he was denying, and did not appear to be objectively experiencing, hallucinations, paranoia and " SI. He insisted that he now could be trusted and blamed the treatment team for recommending [facility] if we really had a better plan for him, when in fact, other options were quite limited. He was flippant and also portrayed him self as powerless to resist crack use because of the large amount of money he has possession of. He continued to express desire to get CD treatment throughout his hospitalization. Petition for commitment supported by  on 04/24.     Medical course Patient with history of HTN, was hypertensive at unit and the BP readings are downtrending. He also initially expressed some chest pain at ED when the EKG and Troponin were obtained and were unchanged from past. At the unit patient did not report any chest pain. He had some shortness of breath for which he was given albuterol (helped). On 04/19, he noted lower right quadrant abdominal pain that is constant and sharp in nature and worsens with palpation. He was given Tylenol. The pain was not a concern the following day. On 04/23, he mentioned persistent pain in his right great toe in the 1st MTP joint region, medicine was consulted and diagnosed sprain vs. strain. Recommended treatment with rest, ice, Tylenol. Toe pain noted to be improved on 04/28.     Plan      Principal Diagnosis:   #Cocaine/crack intoxication and use disorder  #Possible cyclothymia or BP II disorder  - commitment petition supported by , court date scheduled for 05/04/17.  - awaiting MICD treatment placement, goal for treatment starting 05/05/17.  - paperwork to [facility] in [place], [place]      Medications:   Continue:   -Risperidone 1mg QHS  -Hydroxyzine 25-50 PRN for anxiety  -Olanzapine for emergency  -Trazodone PRN for sleep  -Folic Acid  -Thiamine   -Multivitamin      Laboratory/Imaging: Admission labs were unremarkable/unchanged from past.       Consults:   Internal Medicine - recs appreciated, signed off 04/19. Re-consulted on 04/24, now  following peripherally.      Patient will be treated in therapeutic milieu with appropriate individual and group therapies as described.      Medical diagnoses to be addressed this admission:    # h/o non-ischemic cardiomyopathy: Patient reports history of chest pain, now respolved. Troponin I chronically elevated , unchanged from base line. EKG NSR. Medicine recommends not rechecking troponin levels.  - ASA tab 81 mg daily     # HTN/CKD: goal BP <130/90: BP has been consistently elevated to 150s/90s. On 04/23, BP readings quite labile. Occasional readings within normal range, but there are also peak recordings in 180s/100s.  - continue Labetalol 200 mg BID  - continue Lisinopril to 40 mg daily  - increase amlodipine to 7.5 mg daily, may titrate to 10mg in future per medicine rec  - internal medicine following BP peripherally  - avoid nephrotoxins  - f/u with outpatient nephrologist (scheduled)    #hyperlipidemia: results suggestive of hyperlipidemia. Patient previously on Atorvastatin, unclear why this was stopped. Has been restarted this hospitalization.  - Atorvastatin 10mg    # asthma  - albuterol PRN      #GERD:  - continue Zantac BID    #right great toe sprain/strain, resolved: internal medicine consulted, believes is sprain or strain. Recommend tylenol, ice, rest.  - tylenol PRN  - rest  - ice pack    #new onset RLQ abdominal pain, resolved: Patient reports began 04/18 in the afternoon. It is sharp and constant in nature, worsens to palpation. This is a new pain that he has not experienced before. No bowel/bladder symptoms. Nothing is noted to make it better or worse. First dose of Tylenol did not alleviate the pain. No history of abdominal surgery. CBC on 04/17 showed mildly elevated WBC at 11.6. Patient appears comfortable. No mention of pain on 04/20.  - Tylenol 650mg PO q4h PRN  - continue to monitor for improvement, low threshold for alerting medicine and drawing repeat CBC        Consults: Internal  "medicine, appreciate recs. Signed off 04/19/17. Re-consulted 04/23.      Legal Status: Voluntary      Safety Assessment:   Checks: Status 15  Precautions: substance withdrawal, suicide  Pt has not required locked seclusion or restraints in the past 24 hours to maintain safety, please refer to RN documentation for further details.    The risks, benefits, alternatives and side effects have been discussed and are understood by the patient and other caregivers.     Anticipated Disposition/Discharge Date: Unknown at this time. Awaiting court ruling and MICD treatment placement.     Scribed by Jyoti Hough, MS3, for Dr. Melissa Jimenez, PGY1, on 05/01/17 at 12:35pm.    Attestation:  I have reviewed and edited the documentation recorded by the scribe. This documentation accurately reflects the services I personally performed and treatment decisions made by me in consultation with the attending physician.     Melissa Jimenez MD  Psychiatry PGY1    Psychiatry Attending Attestation:  This patient has been seen and evaluated by me, Fernando Crawford M.D.  The patient's condition and treatment plan were discussed with the resident, and care coordinated with the CTC and RN. I reviewed, edited and agree with the findings and plan in this note.     Fernando Crawford M.D.   of Psychiatry  Interim History:   The patient's care was discussed with the treatment team and chart notes were reviewed.     Sleep: 7 hr  Vitals: Hypertensive - max 172/98. Remainder within normal limits.  Meds: compliant. PRNs over the weekend: trazodone nightly, albuterol x1  Staff note: reviewed     Subjective:  Bryon reports his mood as \"good\" today. He again spoke with family and friends on the phone this past weekend, which made him happy. He says their attitude is supportive, and they say, \"Get it together\" so that he can get back to \"doing what you do best.\" When he is done with CD treatment, his plan is to get a house with his " "mother. Currently, they have an apartment together, and that lease ends in June. When asked if his mother is sober, he says she drinks an occasional glass of wine.    In regards to CD treatment, Bryon has continued to be proactive in the search for a spot for himself. He sounds optimistic about [facility] in [place], [place]. He spoke with someone there who said there were multiple beds available and that they will hold a bed for him as soon as they receive his paperwork. Bryon says getting the paperwork there is in process. He is hopeful that he will be able to start there on Friday (05/05/17).    Bryon's only concern today is his blood pressure which was again elevated over the weekend. Per medicine recs, the amlodipine dose was increased. Bryon realizes this is something that will need to continue to be monitored and adjusted as an outpatient after leaving the hospital.    Review of systems:      The Review of Systems is negative other than noted above     Medications:    Scheduled:    amLODIPine  7.5 mg Oral Daily     lisinopril (PRINIVIL/ZESTRIL) tablet 40 mg  40 mg Oral Daily     atorvastatin  10 mg Oral Daily     labetalol  200 mg Oral BID     risperiDONE (risperDAL) tablet 1 mg  1 mg Oral At Bedtime     ranitidine  150 mg Oral BID     aspirin  81 mg Oral Daily       PRN:  acetaminophen, albuterol, EPINEPHrine, hydrOXYzine, traZODone, potassium chloride, potassium chloride, albuterol    Allergies:     Allergies   Allergen Reactions     Banana Anaphylaxis         Psychiatric Examination:   Vital signs:  Temp: 97.4  F (36.3  C)   BP: 150/90 Pulse: 62   Resp: 16         Weight: 106.6 kg (235 lb)  Estimated body mass index is 37.93 kg/(m^2) as calculated from the following:    Height as of 4/12/17: 1.676 m (5' 6\").    Weight as of this encounter: 106.6 kg (235 lb).    Appearance: awake, alert, adequately groomed and dressed in hospital scrubs, proper use of jewlery  Attitude: cooperative  Eye Contact: " "good  Mood: \"good\"  Affect: mood congruent, bright, smiles appropriately  Speech: clear, coherent  Psychomotor Behavior: no evidence of tardive dyskinesia, dystonia, or tics  Thought Process: logical, linear and goal oriented   Associations: no loose associations  Thought Content: no evidence of suicidal ideation or homicidal ideation, no evidence of psychotic thought, no auditory hallucinations present and no visual hallucinations present  Insight: good  Judgment: limited  Oriented to: time, person, and place  Attention Span and Concentration: intact  Recent and Remote Memory: intact  Language: Able to name objects, Able to repeat phrases and Able to read and write  Fund of Knowledge: appropriate  Muscle Strength and Tone: normal  Gait and Station: normal     Labs:   No new lab results.  No labs pending.      Antipsychotic Labs:        Recent Labs   Lab Test 04/13/17  0723 08/19/16  0718 05/25/16  0733   CHOL 213* 274* 177   TRIG 179* 181* 151*   * 183* 98   HDL 75 55 49            Recent Labs   Lab Test 04/17/17  0501 04/12/17  0414 09/21/16  0711   * 132* 80            Recent Labs   Lab Test 04/17/17  0501 04/12/17  0414 09/05/16  0926   WBC 11.6* 11.3* 8.8   ANEU 7.2 9.0* 5.9   HGB 13.2* 13.4 12.9*   * 395 329              "

## 2017-05-01 NOTE — PROGRESS NOTES
05/01/17 1100   Behavioral Health   Hallucinations denies / not responding to hallucinations   Thinking intact   Orientation situation, disoriented;person: oriented;place: oriented;date: oriented;time: oriented   Memory baseline memory   Insight insight appropriate to situation   Judgement intact   Eye Contact at examiner   Affect full range affect   Mood mood is calm   Physical Appearance/Attire attire appropriate to age and situation   Hygiene well groomed   Activity withdrawn   Speech clear;coherent   Psychomotor / Gait balanced;steady   Psycho Education   Type of Intervention 1:1 intervention   Response participates with cues/redirection   Hours 0.5   Treatment Detail (Triggers)   Activities of Daily Living   Hygiene/Grooming independent   Oral Hygiene independent   Dress independent   Laundry with supervision   Room Organization independent   Activity   Activity Level of Assistance independent   Behavioral Health Interventions   Depression maintain safety precautions;monitor need to revise level of observation;maintain safe secure environment   Social and Therapeutic Interventions (Depression) encourage socialization with peers;encourage effective boundaries with peers;encourage participation in therapeutic groups and milieu activities   Pt is calm, pleasant on approach..He is social with peers..He went to groups and participated.He told this writer, he is anxious to go to treatment.He denies all Mental Health Symptoms.

## 2017-05-01 NOTE — PLAN OF CARE
Problem: Depressive Symptoms  Goal: Social and Therapeutic (Depression)  Signs and symptoms of listed problems will be absent or manageable.   Pt. Attended 2 of 3 scheduled Occupational Therapy sessions today. Pt.attended a Goal Setting session and actively participated in a discussion of guidelines for setting goals.  Pt. Identified a priority goal and action steps to accomplish that goal. Pt. Actively participated in goal directed task session. Pt made independent decisions.  Needed some assistance with problem solving.  Social with peers.  Pt.was cooperative and pleasant throughout session.

## 2017-05-01 NOTE — PROGRESS NOTES
Brief Medicine Note, 5/1/17:    Internal Medicine following for HTN management. .  Patient is a 32 year old male with past medical history significant for substance abuse (crack, cocaine) and bipolar II disorder admitted for suicidal ideation on 4/17/17.     Increased amlodipine from 5mg to 7.5mg PO daily beginning this am, 5/1.  BP improved to 133/73 from SBP 150s.  Goal SBP <130. Will continue to titrate as needed. Continue Labetalol 200mg BID and Lisinopril 40mg daily.     Medicine will continue to follow along.         Francine Potts CNP  Hospitalist Service   Pager: [phone]

## 2017-05-02 PROCEDURE — 90853 GROUP PSYCHOTHERAPY: CPT

## 2017-05-02 PROCEDURE — 99232 SBSQ HOSP IP/OBS MODERATE 35: CPT | Mod: GC | Performed by: PSYCHIATRY & NEUROLOGY

## 2017-05-02 PROCEDURE — 25000132 ZZH RX MED GY IP 250 OP 250 PS 637: Performed by: PHYSICIAN ASSISTANT

## 2017-05-02 PROCEDURE — 25000132 ZZH RX MED GY IP 250 OP 250 PS 637: Performed by: STUDENT IN AN ORGANIZED HEALTH CARE EDUCATION/TRAINING PROGRAM

## 2017-05-02 PROCEDURE — 25000132 ZZH RX MED GY IP 250 OP 250 PS 637: Performed by: PSYCHIATRY & NEUROLOGY

## 2017-05-02 PROCEDURE — 12400001 ZZH R&B MH UMMC

## 2017-05-02 RX ADMIN — ATORVASTATIN CALCIUM 10 MG: 10 TABLET, FILM COATED ORAL at 21:05

## 2017-05-02 RX ADMIN — ASPIRIN 81 MG CHEWABLE TABLET 81 MG: 81 TABLET CHEWABLE at 08:06

## 2017-05-02 RX ADMIN — Medication 25 MG: at 21:11

## 2017-05-02 RX ADMIN — LABETALOL HCL 200 MG: 200 TABLET, FILM COATED ORAL at 21:05

## 2017-05-02 RX ADMIN — AMLODIPINE BESYLATE 7.5 MG: 2.5 TABLET ORAL at 08:06

## 2017-05-02 RX ADMIN — RANITIDINE HYDROCHLORIDE 150 MG: 150 TABLET, FILM COATED ORAL at 17:49

## 2017-05-02 RX ADMIN — RISPERIDONE 1 MG: 1 TABLET ORAL at 21:05

## 2017-05-02 RX ADMIN — RANITIDINE HYDROCHLORIDE 150 MG: 150 TABLET, FILM COATED ORAL at 14:58

## 2017-05-02 RX ADMIN — LABETALOL HCL 200 MG: 200 TABLET, FILM COATED ORAL at 08:06

## 2017-05-02 RX ADMIN — LISINOPRIL 40 MG: 20 TABLET ORAL at 08:06

## 2017-05-02 ASSESSMENT — ACTIVITIES OF DAILY LIVING (ADL)
LAUNDRY: WITH SUPERVISION
GROOMING: INDEPENDENT;SHOWER
DRESS: SCRUBS (BEHAVIORAL HEALTH)
DRESS: SCRUBS (BEHAVIORAL HEALTH);INDEPENDENT
ORAL_HYGIENE: INDEPENDENT
GROOMING: INDEPENDENT
ORAL_HYGIENE: INDEPENDENT

## 2017-05-02 NOTE — PROGRESS NOTES
Pt present in milieu, attended groups, showered, got dressed in clean clothes, ate meals, social with peers. Pt talked about wanting to do to aftercare/outpt tx after he finishes with inpt cd tx. This writer gave pt psychoeducation on the effects of substances on the ability to live a stable life without extreme highs and lows. Pt appears responsive to information and appears to have a deepening sense of insight/readiness for tx in regards to cocaine use disorder.      05/02/17 1400   Behavioral Health   Hallucinations denies / not responding to hallucinations   Thinking intact   Orientation person: oriented;place: oriented;date: oriented   Memory baseline memory   Insight insight appropriate to situation   Judgement intact   Eye Contact at examiner   Affect full range affect   Mood mood is calm   Physical Appearance/Attire neat   Hygiene well groomed   Suicidality other (see comments)  (denies)   Self Injury other (see comment)  (denies)   Activity other (see comment)  (present in milieu)   Speech clear;coherent   Medication Sensitivity no stated side effects;no observed side effects   Psychomotor / Gait balanced;steady   Psycho Education   Type of Intervention 1:1 intervention  (Simultaneous filing. User may not have seen previous data.)   Response participates, initiates socially appropriate  (Simultaneous filing. User may not have seen previous data.)   Hours 0.5  (Simultaneous filing. User may not have seen previous data.)   Treatment Detail (check-in/goals Simultaneous filing. User may not have seen previous data.)   Activities of Daily Living   Hygiene/Grooming independent;shower   Oral Hygiene independent   Dress scrubs (behavioral health);independent   Laundry with supervision   Room Organization independent

## 2017-05-02 NOTE — PROGRESS NOTES
"     ----------------------------------------------------------------------------------------------------------  [facility], [place]   Psychiatric Progress Note     Assessment    Presentation: Bryon Alonzo is a 32 year old male with a history of substance use disorder (crack, cocaine), Bipolar II, who presents with cocaine and crack, stating \"I wanted to use more and more and didn't care if I  because of it\". He was discharged 17 for a similar hospitalization. The discharge plan three days ago was for Bryon to take a cab to get a Rule 25 assessment. He expressed \" I like cocaine and the way it makes me feel\" and that \"the moment I got in that cab to my Rule 25, I decided 'no'\". Over the past three days he reports of spending $4000 on 4-5oz of crack and cocaine. He states that \"I haven't slept or been sober since the moment I left the hospital\". He also has not taken any of his medications during this time. He decided to return to the hospital today because the \"demon inside me has been wasted to nothing\". He thinks this hospitalization will be different from the last because he is \"ready to be clean and go to treatment\".     Diagnostic Impression: Patient has a long history of cocaine /crack use disorder. He gets suicidal while on cocaine and after a brief hospitalization and stabilization , restarts the use. Patient is diagnosed with cocaine use disorder. Diagnosis of bipolar II is questionable and the impact of possible Bipolar disorder on his current clinical picture is not known.      Hospital course: Bryon Alonzo was admitted to station 22 as a voluntary patient and PTA meds restarted. He stated that he never took the medications after discharge. He agreed with team on petitioning for commitment and expressed his wish to get into a  treatment facility. WIthin 2 days he was denying, and did not appear to be objectively experiencing, hallucinations, paranoia and " SI. He insisted that he now could be trusted and blamed the treatment team for recommending [address] if we really had a better plan for him, when in fact, other options were quite limited. He was flippant and also portrayed him self as powerless to resist crack use because of the large amount of money he has possession of. He continued to express desire to get CD treatment throughout his hospitalization. Petition for commitment supported by  on 04/24.     Medical course Patient with history of HTN, was hypertensive at unit and the BP readings are downtrending. He also initially expressed some chest pain at ED when the EKG and Troponin were obtained and were unchanged from past. At the unit patient did not report any chest pain. He had some shortness of breath for which he was given albuterol (helped). On 04/19, he noted lower right quadrant abdominal pain that is constant and sharp in nature and worsens with palpation. He was given Tylenol. The pain was not a concern the following day. On 04/23, he mentioned persistent pain in his right great toe in the 1st MTP joint region, medicine was consulted and diagnosed sprain vs. strain. Recommended treatment with rest, ice, Tylenol. Toe pain noted to be improved on 04/28.     Plan      Principal Diagnosis:   #Cocaine/crack intoxication and use disorder  #Possible cyclothymia or BP II disorder  - commitment petition supported by , court date scheduled for 05/04/17.  - awaiting MICD treatment placement, goal for treatment starting 05/05/17.  - phone interview with [facility] facility today      Medications:   Continue:   -Risperidone 1mg QHS  -Hydroxyzine 25-50 PRN for anxiety  -Olanzapine for emergency  -Trazodone PRN for sleep  -Folic Acid  -Thiamine   -Multivitamin      Laboratory/Imaging: Admission labs were unremarkable/unchanged from past.       Consults:   Internal Medicine - recs appreciated, signed off 04/19. Re-consulted on 04/24, now following  peripherally.      Patient will be treated in therapeutic milieu with appropriate individual and group therapies as described.      Medical diagnoses to be addressed this admission:    # h/o non-ischemic cardiomyopathy: Patient reports history of chest pain, now respolved. Troponin I chronically elevated , unchanged from base line. EKG NSR. Medicine recommends not rechecking troponin levels.  - ASA tab 81 mg daily     # HTN/CKD: goal BP <130/90: BP has been consistently elevated to 150s/90s. On 04/23, BP readings quite labile. Occasional readings within normal range, but there are also peak recordings in 180s/100s.  - continue Labetalol 200 mg BID  - continue Lisinopril to 40 mg daily  - increase amlodipine to 7.5 mg daily, may titrate to 10mg in future per medicine rec  - internal medicine following BP peripherally  - avoid nephrotoxins  - f/u with outpatient nephrologist (scheduled)    #hyperlipidemia: results suggestive of hyperlipidemia. Patient previously on Atorvastatin, unclear why this was stopped. Has been restarted this hospitalization.  - Atorvastatin 10mg    # asthma  - albuterol PRN      #GERD:  - continue Zantac BID    #right great toe sprain/strain, resolved: internal medicine consulted, believes is sprain or strain. Recommend tylenol, ice, rest.  - tylenol PRN  - rest  - ice pack    #new onset RLQ abdominal pain, resolved: Patient reports began 04/18 in the afternoon. It is sharp and constant in nature, worsens to palpation. This is a new pain that he has not experienced before. No bowel/bladder symptoms. Nothing is noted to make it better or worse. First dose of Tylenol did not alleviate the pain. No history of abdominal surgery. CBC on 04/17 showed mildly elevated WBC at 11.6. Patient appears comfortable. No mention of pain on 04/20.  - Tylenol 650mg PO q4h PRN  - continue to monitor for improvement, low threshold for alerting medicine and drawing repeat CBC        Consults: Internal medicine,  "appreciate recs. Signed off 04/19/17. Re-consulted 04/23.      Legal Status: Voluntary      Safety Assessment:   Checks: Status 15  Precautions: substance withdrawal, suicide  Pt has not required locked seclusion or restraints in the past 24 hours to maintain safety, please refer to RN documentation for further details.    The risks, benefits, alternatives and side effects have been discussed and are understood by the patient and other caregivers.     Anticipated Disposition/Discharge Date: Unknown at this time. Awaiting court ruling and MICD treatment placement.     Scribed by Jyoti Hough, MS3, for Dr. Melissa Jimenez, PGY1, on 05/02/17 at 2:35pm.    Attestation:  I have reviewed and edited the documentation recorded by the scribe. This documentation accurately reflects the services I personally performed and treatment decisions made by me in consultation with the attending physician.     Melissa Jimenez MD  Psychiatry PGY1    Psychiatry Attending Attestation:  This patient has been seen and evaluated by me, Fernando Crawford M.D.  The patient's condition and treatment plan were discussed with the resident, and care coordinated with the CTC and RN. I reviewed, edited and agree with the findings and plan in this note.     Fernando Crawford M.D.   of Psychiatry    Interim History:   The patient's care was discussed with the treatment team and chart notes were reviewed.     Sleep: 6.75 hr  Vitals: Hypertensive - max 157/98. Bradycardic to 57 bpm. Remainder within normal limits.  Meds: compliant. PRNs over the weekend: trazodone x1  Staff note: reviewed     Subjective:  Bryon describes his mood as \"sleepy\" today. He is looking forward to his phone interview with [facility] later today and is happy that the process is moving forward. Other than that, he is doing well. His foot continues to be pain-free. His only note is that he's feeling more tired than usual in the mornings " "after breakfast and his medications. He is unsure if this is related to medication changes, the fact that he just ate, or being cooped up and somewhat bored. He says to make the unit better, his suggestions are more X-box games, additional music options, as well as making OT activities available more frequently so that there are more things to do throughout the day.    Review of systems:      The Review of Systems is negative other than noted above     Medications:    Scheduled:    amLODIPine  7.5 mg Oral Daily     lisinopril (PRINIVIL/ZESTRIL) tablet 40 mg  40 mg Oral Daily     atorvastatin  10 mg Oral Daily     labetalol  200 mg Oral BID     risperiDONE (risperDAL) tablet 1 mg  1 mg Oral At Bedtime     ranitidine  150 mg Oral BID     aspirin  81 mg Oral Daily       PRN:  acetaminophen, albuterol, EPINEPHrine, hydrOXYzine, traZODone, potassium chloride, potassium chloride, albuterol    Allergies:     Allergies   Allergen Reactions     Banana Anaphylaxis         Psychiatric Examination:   Vital signs:  Temp: 96.9  F (36.1  C)   BP: (!) 157/98   Heart Rate: 67   SpO2: 100 % O2 Device: None (Room air)     Weight: 105 kg (231 lb 8 oz)  Estimated body mass index is 37.37 kg/(m^2) as calculated from the following:    Height as of 4/12/17: 1.676 m (5' 6\").    Weight as of this encounter: 105 kg (231 lb 8 oz).    Appearance: awake, alert, adequately groomed and dressed in hospital scrubs, proper use of jewlery  Attitude: cooperative  Eye Contact: good  Mood: \"good, sleepy\"  Affect: mood congruent, bright, smiles appropriately  Speech: clear, coherent  Psychomotor Behavior: no evidence of tardive dyskinesia, dystonia, or tics  Thought Process: logical, linear and goal oriented   Associations: no loose associations  Thought Content: no evidence of suicidal ideation or homicidal ideation, no evidence of psychotic thought, no auditory hallucinations present and no visual hallucinations present  Insight: good  Judgment: " limited  Oriented to: time, person, and place  Attention Span and Concentration: intact  Recent and Remote Memory: intact  Language: Able to name objects, Able to repeat phrases and Able to read and write  Fund of Knowledge: appropriate  Muscle Strength and Tone: normal  Gait and Station: normal     Labs:   No new lab results.  No labs pending.      Antipsychotic Labs:        Recent Labs   Lab Test 04/13/17  0723 08/19/16  0718 05/25/16  0733   CHOL 213* 274* 177   TRIG 179* 181* 151*   * 183* 98   HDL 75 55 49            Recent Labs   Lab Test 04/17/17  0501 04/12/17  0414 09/21/16  0711   * 132* 80            Recent Labs   Lab Test 04/17/17  0501 04/12/17  0414 09/05/16  0926   WBC 11.6* 11.3* 8.8   ANEU 7.2 9.0* 5.9   HGB 13.2* 13.4 12.9*   * 395 329

## 2017-05-02 NOTE — PROGRESS NOTES
05/01/17 2000   Behavioral Health   Thoughts/Cognition (WDL) insight   Hallucinations denies / not responding to hallucinations   Thinking intact   Orientation person: oriented   Memory baseline memory   Insight insight appropriate to situation   Judgement impaired   Eye Contact at examiner   Affect irritable   Mood mood is calm   Physical Appearance/Attire disheveled   Hygiene neglected grooming - unclean body, hair, teeth   Suicidality other (see comments)   Self Injury other (see comment)   Activity restless;other (see comment)   Speech clear;coherent   Medication Sensitivity no stated side effects   Coping/Psychosocial   Verbalized Emotional State acceptance   Plan Of Care Reviewed With patient   Patient Agreement with Plan of Care agrees   Psycho Education   Type of Intervention 1:1 intervention   Response participates with encouragement   Hours 0.5   Activities of Daily Living   Hygiene/Grooming independent   Oral Hygiene independent   Dress independent   Laundry with supervision   Room Organization independent   Activity   Activity Level of Assistance independent     Patient spent the evening in OT room watching TV. He ate dinner and snack with peers in common area. Mood was calm and quiet . Affect was tense and blunted. He came to group and participated with good understanding of the subject.

## 2017-05-02 NOTE — PLAN OF CARE
Problem: General Plan of Care (Inpatient Behavioral)  Goal: Team Discussion  Team Plan:   BEHAVIORAL TEAM DISCUSSION     Continued Stay Criteria/Rationale: Awaiting civil commitment court  Plan: Patient will attend court on Thursday for commitment and if a Stay of commitment is reached patient will attend CD treatment shortly after court.  Participants: Zuleika Gaines [facility], Namrata Sharpe RN,  Dr. Fernando Crawford MD, Dr. Melissa Jimenez MD, Jyoti Hough MS3  Summary/Recommendation: Patient is looking forward to treatment and is agreeable with plans.   Medical/Physical: Hyper tension, kidney disease  Progress: Improving

## 2017-05-03 PROCEDURE — 25000132 ZZH RX MED GY IP 250 OP 250 PS 637: Performed by: PSYCHIATRY & NEUROLOGY

## 2017-05-03 PROCEDURE — 97150 GROUP THERAPEUTIC PROCEDURES: CPT | Mod: GO

## 2017-05-03 PROCEDURE — 99232 SBSQ HOSP IP/OBS MODERATE 35: CPT | Mod: GC | Performed by: PSYCHIATRY & NEUROLOGY

## 2017-05-03 PROCEDURE — 25000132 ZZH RX MED GY IP 250 OP 250 PS 637: Performed by: STUDENT IN AN ORGANIZED HEALTH CARE EDUCATION/TRAINING PROGRAM

## 2017-05-03 PROCEDURE — 25000132 ZZH RX MED GY IP 250 OP 250 PS 637: Performed by: PHYSICIAN ASSISTANT

## 2017-05-03 PROCEDURE — 90853 GROUP PSYCHOTHERAPY: CPT

## 2017-05-03 PROCEDURE — 12400001 ZZH R&B MH UMMC

## 2017-05-03 RX ORDER — MULTIVITAMIN,THERAPEUTIC
1 TABLET ORAL DAILY
Status: DISCONTINUED | OUTPATIENT
Start: 2017-05-03 | End: 2017-05-08 | Stop reason: HOSPADM

## 2017-05-03 RX ORDER — AMLODIPINE BESYLATE 2.5 MG/1
10 TABLET ORAL DAILY
Status: DISCONTINUED | OUTPATIENT
Start: 2017-05-04 | End: 2017-05-08 | Stop reason: HOSPADM

## 2017-05-03 RX ADMIN — LISINOPRIL 40 MG: 20 TABLET ORAL at 08:42

## 2017-05-03 RX ADMIN — THERA TABS 1 TABLET: TAB at 13:52

## 2017-05-03 RX ADMIN — ACETAMINOPHEN 650 MG: 325 TABLET, FILM COATED ORAL at 14:24

## 2017-05-03 RX ADMIN — LABETALOL HCL 200 MG: 200 TABLET, FILM COATED ORAL at 08:41

## 2017-05-03 RX ADMIN — ASPIRIN 81 MG CHEWABLE TABLET 81 MG: 81 TABLET CHEWABLE at 08:42

## 2017-05-03 RX ADMIN — RANITIDINE HYDROCHLORIDE 150 MG: 150 TABLET, FILM COATED ORAL at 08:42

## 2017-05-03 RX ADMIN — AMLODIPINE BESYLATE 7.5 MG: 2.5 TABLET ORAL at 08:41

## 2017-05-03 RX ADMIN — RISPERIDONE 1 MG: 1 TABLET ORAL at 20:49

## 2017-05-03 RX ADMIN — ATORVASTATIN CALCIUM 10 MG: 10 TABLET, FILM COATED ORAL at 20:49

## 2017-05-03 RX ADMIN — RANITIDINE HYDROCHLORIDE 150 MG: 150 TABLET, FILM COATED ORAL at 17:31

## 2017-05-03 RX ADMIN — Medication 25 MG: at 20:49

## 2017-05-03 RX ADMIN — LABETALOL HCL 200 MG: 200 TABLET, FILM COATED ORAL at 20:49

## 2017-05-03 RX ADMIN — VITAMIN D, TAB 1000IU (100/BT) 2000 UNITS: 25 TAB at 13:52

## 2017-05-03 ASSESSMENT — ACTIVITIES OF DAILY LIVING (ADL)
ORAL_HYGIENE: INDEPENDENT
LAUNDRY: WITH SUPERVISION
GROOMING: INDEPENDENT
LAUNDRY: WITH SUPERVISION
DRESS: INDEPENDENT
GROOMING: WITH SUPERVISION
ORAL_HYGIENE: INDEPENDENT
DRESS: INDEPENDENT

## 2017-05-03 NOTE — PROGRESS NOTES
"     ----------------------------------------------------------------------------------------------------------  [facility], [place]   Psychiatric Progress Note     Assessment    Presentation: Bryon Alonzo is a 32 year old male with a history of substance use disorder (crack, cocaine), Bipolar II, who presents with cocaine and crack, stating \"I wanted to use more and more and didn't care if I  because of it\". He was discharged 17 for a similar hospitalization. The discharge plan three days ago was for Bryon to take a cab to get a Rule 25 assessment. He expressed \" I like cocaine and the way it makes me feel\" and that \"the moment I got in that cab to my Rule 25, I decided 'no'\". Over the past three days he reports of spending $4000 on 4-5oz of crack and cocaine. He states that \"I haven't slept or been sober since the moment I left the hospital\". He also has not taken any of his medications during this time. He decided to return to the hospital today because the \"demon inside me has been wasted to nothing\". He thinks this hospitalization will be different from the last because he is \"ready to be clean and go to treatment\".     Diagnostic Impression: Patient has a long history of cocaine /crack use disorder. He gets suicidal while on cocaine and after a brief hospitalization and stabilization , restarts the use. Patient is diagnosed with cocaine use disorder. Diagnosis of bipolar II is questionable and the impact of possible Bipolar disorder on his current clinical picture is not known.      Hospital course: Bryon Alonzo was admitted to station 22 as a voluntary patient and PTA meds restarted. He stated that he never took the medications after discharge. He agreed with team on petitioning for commitment and expressed his wish to get into a  treatment facility. WIthin 2 days he was denying, and did not appear to be objectively experiencing, hallucinations, paranoia and " SI. He insisted that he now could be trusted and blamed the treatment team for recommending [facility] if we really had a better plan for him, when in fact, other options were quite limited. He was flippant and also portrayed him self as powerless to resist crack use because of the large amount of money he has possession of. He continued to express desire to get CD treatment throughout his hospitalization. Petition for commitment supported by  on 04/24. Notified on 05/03 of acceptance to [facility] in [place], [place].  Later that day the team was informed by the [facility]  that the [facility] is planning to advocate that he be discharged to a locked CARE facility instead of [facility] at his 05/04 petition hearing.  In the event this should be the court's decision, options for interim placement might include a locked Detox facility, and the patient's information was sent to [address] [place] [address].  The patient spoke with his  regarding this development and stated that he and his  would advocate for discharge to [facility] at the hearing.     Medical course Patient with history of HTN, was hypertensive at unit and the BP readings are downtrending. He also initially expressed some chest pain at ED when the EKG and Troponin were obtained and were unchanged from past. At the unit patient did not report any chest pain. He had some shortness of breath for which he was given albuterol (helped). On 04/19, he noted lower right quadrant abdominal pain that is constant and sharp in nature and worsens with palpation. He was given Tylenol. The pain was not a concern the following day. On 04/23, he mentioned persistent pain in his right great toe in the 1st MTP joint region, medicine was consulted and diagnosed sprain vs. strain. Recommended treatment with rest, ice, Tylenol. Toe pain noted to be resolved on 04/28. Resumed PTA multivitamin and vitamin D3 on 05/03 per patient's request.     Plan       Principal Diagnosis:   #Cocaine/crack intoxication and use disorder  #Possible cyclothymia or BP II disorder  - commitment petition supported by , court date scheduled for 05/04/17.  - awaiting MICD treatment placement, goal for treatment starting 05/05/17.  - accepted to [facility] in [place], [place] starting 05/05 if court agrees.      Medications:     Continue:   -Risperidone 1mg QHS  -Hydroxyzine 25-50 q4 hrs PRN  -Trazodone 25 mg qHS PRN    Laboratory/Imaging: Admission labs were unremarkable/unchanged from past.        Consults:   Internal Medicine - recs appreciated, signed off 04/19. Re-consulted on 04/24, following peripherally.      Patient will be treated in therapeutic milieu with appropriate individual and group therapies as described.      Medical diagnoses to be addressed this admission:    # h/o non-ischemic cardiomyopathy: Patient reports history of chest pain, now respolved. Troponin I chronically elevated , unchanged from base line. EKG NSR. Medicine recommends not rechecking troponin levels.  - ASA tab 81 mg daily     # HTN/CKD: goal BP <130/90: BP has been consistently elevated to 150s/90s. On 04/23, BP readings quite labile. Occasional readings within normal range, but there are also peak recordings in 180s/100s.  - continue Labetalol 200 mg BID  - continue Lisinopril to 40 mg daily  - continue amlodipine 10 mg daily  - internal medicine following BP peripherally  - avoid nephrotoxins  - f/u with outpatient nephrologist (scheduled)    #hyperlipidemia: results suggestive of hyperlipidemia. Patient previously on Atorvastatin, unclear why this was stopped. Has been restarted this hospitalization.  - Atorvastatin 10mg    # asthma  - albuterol PRN      #GERD:  - continue Zantac BID    #right great toe sprain/strain, resolved: internal medicine consulted, believes is sprain or strain. Recommend tylenol, ice, rest.  - tylenol PRN  - rest  - ice pack    #new onset RLQ abdominal  pain, resolved: Patient reports began 04/18 in the afternoon. It is sharp and constant in nature, worsens to palpation. This is a new pain that he has not experienced before. No bowel/bladder symptoms. Nothing is noted to make it better or worse. First dose of Tylenol did not alleviate the pain. No history of abdominal surgery. CBC on 04/17 showed mildly elevated WBC at 11.6. Patient appears comfortable. No mention of pain on 04/20.  - Tylenol 650mg PO q4h PRN  - continue to monitor for improvement, low threshold for alerting medicine and drawing repeat CBC    Consults: Internal medicine, appreciate recs. Signed off 04/19/17. Re-consulted 04/23.      Legal Status: Voluntary      Safety Assessment:   Checks: Status 15  Precautions: substance withdrawal, suicide  Pt has not required locked seclusion or restraints in the past 24 hours to maintain safety, please refer to RN documentation for further details.    The risks, benefits, alternatives and side effects have been discussed and are understood by the patient and other caregivers.     Anticipated Disposition/Discharge Date: Possibly 05/05 to [facility]. Awaiting court ruling.     Scribed by Jyoti Hough, MS3, for George Torres DO, PGY1, on 05/03/17 at 11:40am.    I, George Torres DO, have reviewed and edited the documentation recorded by the scribe.  This documentation accurately reflects the services I personally performed and treatment decisions made by me in consultation with the attending physician.    George Torres DO  Resident Psychiatrist, PGY-1  Pager: [phone]    Psychiatry Attending Attestation:  This patient has been seen and evaluated by me, Fernando Crawford M.D.  The patient's condition and treatment plan were discussed with the resident, and care coordinated with the CTC and RN. I reviewed, edited and agree with the findings and plan in this note.     Fernando Crawford M.D.   of Psychiatry    Interim History:  "  The patient's care was discussed with the treatment team and chart notes were reviewed.     Sleep: 7 hr  Vitals: Mildly hypertensive - max 146/86. Remaining vitals within normal limits.  Meds: compliant. PRNs in past 24 hr: trazodone 25mg x1  Staff notes: reviewed     Subjective:  Bryon is doing \"good\" today. He informs us that his interview with [facility] in [place] went well yesterday and that he has been accepted to start there as soon as possible. With his court hearing scheduled for 05/04, Bryon is planning to start at [facility] on 05/05. He says the only thing that needs to happen yet is for [facility] to confirm the plan. Court is scheduled for 9:00 am tomorrow.    He is experiencing no adverse effects to his medications. He is pleased that his blood pressure has been in a more acceptable range within the past 24 hours. He asks to be restarted on his home multivitamin as well as 2000 units of vitamin D3 that he had been taking prior to admission.    Later in the day the team was informed by the [facility]  that the [facility] is planning to advocate that he be discharged to a locked CARE facility instead of [facility].  In the event this should take place, options for interim placement would include a locked Detox facility, and the patient's information was sent to [address] [place] [address].  The patient spoke with his  regarding this development and stated that he and his  will advocate for discharge to [facility].    Review of systems:      The Review of Systems is negative other than noted above     Medications:    Scheduled:    amLODIPine  7.5 mg Oral Daily     lisinopril (PRINIVIL/ZESTRIL) tablet 40 mg  40 mg Oral Daily     atorvastatin  10 mg Oral Daily     labetalol  200 mg Oral BID     risperiDONE (risperDAL) tablet 1 mg  1 mg Oral At Bedtime     ranitidine  150 mg Oral BID     aspirin  81 mg Oral Daily       PRN:  acetaminophen, albuterol, EPINEPHrine, " "hydrOXYzine, traZODone, potassium chloride, potassium chloride, albuterol    Allergies:     Allergies   Allergen Reactions     Banana Anaphylaxis      Psychiatric Examination:   Vital signs:  Temp: 97.2  F (36.2  C)   BP: 146/86 Pulse: 63   Resp: 16         Weight: 105 kg (231 lb 8 oz)  Estimated body mass index is 37.37 kg/(m^2) as calculated from the following:    Height as of 4/12/17: 1.676 m (5' 6\").    Weight as of this encounter: 105 kg (231 lb 8 oz).    Appearance: awake, alert, adequately groomed and dressed in hospital scrubs, proper use of jewlery  Attitude: cooperative  Eye Contact: good  Mood: \"good\"  Affect: mood congruent, bright, smiles appropriately  Speech: clear, coherent  Psychomotor Behavior: no evidence of tardive dyskinesia, dystonia, or tics  Thought Process: logical, linear and goal oriented   Associations: no loose associations  Thought Content: no evidence of suicidal ideation or homicidal ideation, no evidence of psychotic thought, no auditory hallucinations present and no visual hallucinations present  Insight: good  Judgment: fair  Oriented to: time, person, and place  Attention Span and Concentration: intact  Recent and Remote Memory: intact  Language: fluent English with appropriate vocabulary and syntax  Fund of Knowledge: appropriate  Muscle Strength and Tone: normal  Gait and Station: normal     Labs:   No new lab results.  No labs pending.    Antipsychotic Labs:        Recent Labs   Lab Test 04/13/17  0723 08/19/16  0718 05/25/16  0733   CHOL 213* 274* 177   TRIG 179* 181* 151*   * 183* 98   HDL 75 55 49            Recent Labs   Lab Test 04/17/17  0501 04/12/17  0414 09/21/16  0711   * 132* 80            Recent Labs   Lab Test 04/17/17  0501 04/12/17  0414 09/05/16  0926   WBC 11.6* 11.3* 8.8   ANEU 7.2 9.0* 5.9   HGB 13.2* 13.4 12.9*   * 395 329              "

## 2017-05-03 NOTE — PROGRESS NOTES
Brief Medicine Note, 5/3/17:    Internal Medicine following for HTN management. .  Patient is a 32 year old male with past medical history significant for substance abuse (crack, cocaine) and bipolar II disorder admitted for suicidal ideation on 4/17/17.     Increased amlodipine from 7.5mg to 10mg PO daily beginning this am, 5/3.  BP continues to improve towards goal of SBP <130. Will continue to titrate as needed. Continue Labetalol 200mg BID and Lisinopril 40mg daily.     Medicine will continue to follow along for BP monitoring and medication adjustment.         Francine Potts CNP  Hospitalist Service   Pager: [phone]

## 2017-05-03 NOTE — PROGRESS NOTES
received call from [facility] they are pursuing a full commitment and based on previous elopement behaviors, they would like him to be at a CARE facility.      [facility] called and accepted him into their program as long as he is on a stay, they cannot take patients on a full commitment.     faxed over clinical information to [facility] and [address] [place] [address] for review.

## 2017-05-04 PROCEDURE — 25000132 ZZH RX MED GY IP 250 OP 250 PS 637: Performed by: PHYSICIAN ASSISTANT

## 2017-05-04 PROCEDURE — 25000132 ZZH RX MED GY IP 250 OP 250 PS 637: Performed by: PSYCHIATRY & NEUROLOGY

## 2017-05-04 PROCEDURE — 99232 SBSQ HOSP IP/OBS MODERATE 35: CPT | Mod: GC | Performed by: PSYCHIATRY & NEUROLOGY

## 2017-05-04 PROCEDURE — 25000132 ZZH RX MED GY IP 250 OP 250 PS 637: Performed by: STUDENT IN AN ORGANIZED HEALTH CARE EDUCATION/TRAINING PROGRAM

## 2017-05-04 PROCEDURE — 12400001 ZZH R&B MH UMMC

## 2017-05-04 PROCEDURE — 25000132 ZZH RX MED GY IP 250 OP 250 PS 637: Performed by: NURSE PRACTITIONER

## 2017-05-04 PROCEDURE — 90853 GROUP PSYCHOTHERAPY: CPT

## 2017-05-04 RX ORDER — ALBUTEROL SULFATE 90 UG/1
2 AEROSOL, METERED RESPIRATORY (INHALATION) EVERY 4 HOURS PRN
Qty: 3.7 INHALER | Refills: 0 | Status: SHIPPED | OUTPATIENT
Start: 2017-05-04

## 2017-05-04 RX ORDER — ASPIRIN 81 MG/1
81 TABLET, CHEWABLE ORAL DAILY
Qty: 30 TABLET | Refills: 0 | Status: SHIPPED | OUTPATIENT
Start: 2017-05-04

## 2017-05-04 RX ORDER — ATORVASTATIN CALCIUM 10 MG/1
10 TABLET, FILM COATED ORAL DAILY
Qty: 30 TABLET | Refills: 0 | Status: SHIPPED | OUTPATIENT
Start: 2017-05-04

## 2017-05-04 RX ORDER — LANOLIN ALCOHOL/MO/W.PET/CERES
6 CREAM (GRAM) TOPICAL
Status: DISCONTINUED | OUTPATIENT
Start: 2017-05-04 | End: 2017-05-08 | Stop reason: HOSPADM

## 2017-05-04 RX ORDER — EPINEPHRINE 0.3 MG/.3ML
0.3 INJECTION SUBCUTANEOUS
Qty: 0.6 ML | Refills: 0 | Status: SHIPPED | OUTPATIENT
Start: 2017-05-04

## 2017-05-04 RX ORDER — TRAZODONE HYDROCHLORIDE 50 MG/1
25 TABLET, FILM COATED ORAL
Qty: 30 TABLET | Refills: 0 | Status: SHIPPED | OUTPATIENT
Start: 2017-05-04

## 2017-05-04 RX ORDER — LISINOPRIL 40 MG/1
40 TABLET ORAL DAILY
Qty: 30 TABLET | Refills: 0 | Status: SHIPPED | OUTPATIENT
Start: 2017-05-04

## 2017-05-04 RX ORDER — AMLODIPINE BESYLATE 10 MG/1
10 TABLET ORAL DAILY
Qty: 30 TABLET | Refills: 0 | Status: SHIPPED | OUTPATIENT
Start: 2017-05-04

## 2017-05-04 RX ORDER — LABETALOL 200 MG/1
200 TABLET, FILM COATED ORAL 2 TIMES DAILY
Qty: 60 TABLET | Refills: 0 | Status: SHIPPED | OUTPATIENT
Start: 2017-05-04

## 2017-05-04 RX ORDER — RISPERIDONE 1 MG/1
1 TABLET ORAL AT BEDTIME
Qty: 30 TABLET | Refills: 0 | Status: SHIPPED | OUTPATIENT
Start: 2017-05-04

## 2017-05-04 RX ORDER — MULTIVITAMIN,THERAPEUTIC
1 TABLET ORAL DAILY
Qty: 30 TABLET | Refills: 0 | Status: SHIPPED | OUTPATIENT
Start: 2017-05-04

## 2017-05-04 RX ORDER — LANOLIN ALCOHOL/MO/W.PET/CERES
6 CREAM (GRAM) TOPICAL
Qty: 60 TABLET | Refills: 0 | Status: SHIPPED | OUTPATIENT
Start: 2017-05-04

## 2017-05-04 RX ADMIN — MELATONIN TAB 3 MG 6 MG: 3 TAB at 21:03

## 2017-05-04 RX ADMIN — ATORVASTATIN CALCIUM 10 MG: 10 TABLET, FILM COATED ORAL at 21:03

## 2017-05-04 RX ADMIN — THERA TABS 1 TABLET: TAB at 08:11

## 2017-05-04 RX ADMIN — AMLODIPINE BESYLATE 10 MG: 2.5 TABLET ORAL at 08:11

## 2017-05-04 RX ADMIN — RANITIDINE HYDROCHLORIDE 150 MG: 150 TABLET, FILM COATED ORAL at 16:58

## 2017-05-04 RX ADMIN — LABETALOL HCL 200 MG: 200 TABLET, FILM COATED ORAL at 21:03

## 2017-05-04 RX ADMIN — ASPIRIN 81 MG CHEWABLE TABLET 81 MG: 81 TABLET CHEWABLE at 08:11

## 2017-05-04 RX ADMIN — ACETAMINOPHEN 650 MG: 325 TABLET, FILM COATED ORAL at 16:58

## 2017-05-04 RX ADMIN — LISINOPRIL 40 MG: 20 TABLET ORAL at 08:11

## 2017-05-04 RX ADMIN — ALBUTEROL SULFATE 2 PUFF: 90 AEROSOL, METERED RESPIRATORY (INHALATION) at 11:09

## 2017-05-04 RX ADMIN — LABETALOL HCL 200 MG: 200 TABLET, FILM COATED ORAL at 08:11

## 2017-05-04 RX ADMIN — Medication 25 MG: at 21:04

## 2017-05-04 RX ADMIN — VITAMIN D, TAB 1000IU (100/BT) 2000 UNITS: 25 TAB at 08:11

## 2017-05-04 RX ADMIN — RANITIDINE HYDROCHLORIDE 150 MG: 150 TABLET, FILM COATED ORAL at 08:11

## 2017-05-04 RX ADMIN — RISPERIDONE 1 MG: 1 TABLET ORAL at 21:04

## 2017-05-04 ASSESSMENT — ACTIVITIES OF DAILY LIVING (ADL)
DRESS: STREET CLOTHES
GROOMING: INDEPENDENT
ORAL_HYGIENE: INDEPENDENT
GROOMING: INDEPENDENT
ORAL_HYGIENE: INDEPENDENT
DRESS: STREET CLOTHES

## 2017-05-04 NOTE — PROGRESS NOTES
"     ----------------------------------------------------------------------------------------------------------  [facility], [place]   Psychiatric Progress Note     Assessment    Presentation: Bryon Alonzo is a 32 year old male with a history of substance use disorder (crack, cocaine), Bipolar II, who presents with cocaine and crack, stating \"I wanted to use more and more and didn't care if I  because of it\". He was discharged 17 for a similar hospitalization. The discharge plan three days ago was for Bryon to take a cab to get a Rule 25 assessment. He expressed \" I like cocaine and the way it makes me feel\" and that \"the moment I got in that cab to my Rule 25, I decided 'no'\". Over the past three days he reports of spending $4000 on 4-5oz of crack and cocaine. He states that \"I haven't slept or been sober since the moment I left the hospital\". He also has not taken any of his medications during this time. He decided to return to the hospital today because the \"demon inside me has been wasted to nothing\". He thinks this hospitalization will be different from the last because he is \"ready to be clean and go to treatment\".     Diagnostic Impression: Patient has a long history of cocaine /crack use disorder. He gets suicidal while on cocaine and after a brief hospitalization and stabilization , restarts the use. Patient is diagnosed with cocaine use disorder. Diagnosis of bipolar II is questionable and the impact of possible Bipolar disorder on his current clinical picture is not known.      Hospital course: Bryon Alonzo was admitted to station 22 as a voluntary patient and PTA meds restarted. He stated that he never took the medications after discharge. He agreed with team on petitioning for commitment and expressed his wish to get into a  treatment facility. WIthin 2 days he was denying, and did not appear to be objectively experiencing, hallucinations, paranoia and " SI. He insisted that he now could be trusted and blamed the treatment team for recommending [address] if we really had a better plan for him, when in fact, other options were quite limited. He was flippant and also portrayed him self as powerless to resist crack use because of the large amount of money he has possession of. He continued to express desire to get CD treatment throughout his hospitalization. Petition for commitment supported by  on 04/24. Notified on 05/03 of acceptance to [facility] in [place], [place].  Later that day the team was informed by the [facility]  that the [facility] is planning to advocate that he be discharged to a [facility] facility instead of [facility] at his 05/04 petition hearing.  After learning of this, the patient spoke with his  regarding this development and stated that he and his  would advocate for discharge to [facility] with the treatment team's blessing. On 05/04, during the hearing the court decided to silvana the requested stay of commitment, and it was determined that the patient would indeed discharge to [facility] at the earliest possible date.     Medical course Patient with history of HTN, was hypertensive at unit and the BP readings are downtrending. He also initially expressed some chest pain at ED when the EKG and Troponin were obtained and were unchanged from past. At the unit patient did not report any chest pain. He had some shortness of breath for which he was given albuterol (helped). On 04/19, he noted lower right quadrant abdominal pain that is constant and sharp in nature and worsens with palpation. He was given Tylenol. The pain was not a concern the following day. On 04/23, he mentioned persistent pain in his right great toe in the 1st MTP joint region, medicine was consulted and diagnosed sprain vs. strain. Recommended treatment with rest, ice, Tylenol. Toe pain noted to be resolved on 04/28. Resumed PTA multivitamin and  vitamin D3 on 05/03 per patient's request.     Plan      Principal Diagnosis:   #Cocaine/crack intoxication and use disorder  #Possible cyclothymia or BP II disorder  - court on 05/04 granted stay of commitment  - discharge to [facility] in [place], [place] possibly as soon as 05/05      Medications:     Continue:   -Risperidone 1 mg QHS  -Hydroxyzine 25-50 q4 hrs PRN  -Trazodone 25 mg qHS PRN    Start:  -Melatonin 6 mg PRN    Laboratory/Imaging: Admission labs were unremarkable/unchanged from past.        Consults:   Internal Medicine - recs appreciated, signed off 04/19. Re-consulted on 04/24, following peripherally.      Patient will be treated in therapeutic milieu with appropriate individual and group therapies as described.      Medical diagnoses to be addressed this admission:    # h/o non-ischemic cardiomyopathy: Patient reports history of chest pain, now respolved. Troponin I chronically elevated , unchanged from base line. EKG NSR. Medicine recommends not rechecking troponin levels.  - ASA tab 81 mg daily     # HTN/CKD: goal BP <130/90: BP has been consistently elevated to 150s/90s. On 04/23, BP readings quite labile. Occasional readings within normal range, but there are also peak recordings in 180s/100s.  - continue Labetalol 200 mg BID  - continue Lisinopril to 40 mg daily  - continue amlodipine 10 mg daily, may titrate higher  - internal medicine following BP peripherally  - avoid nephrotoxins  - f/u with outpatient nephrologist (scheduled)  - f/u BP as outpatient with primary care    #hyperlipidemia: results suggestive of hyperlipidemia. Patient previously on Atorvastatin, unclear why this was stopped. Has been restarted this hospitalization.  - Atorvastatin 10mg    # asthma  - albuterol PRN      #GERD:  - continue Zantac BID    #right great toe sprain/strain, resolved: internal medicine consulted, believes is sprain or strain. Recommend tylenol, ice, rest.  - tylenol PRN  - rest  - ice  pack    #new onset RLQ abdominal pain, resolved: Patient reports began 04/18 in the afternoon. It is sharp and constant in nature, worsens to palpation. This is a new pain that he has not experienced before. No bowel/bladder symptoms. Nothing is noted to make it better or worse. First dose of Tylenol did not alleviate the pain. No history of abdominal surgery. CBC on 04/17 showed mildly elevated WBC at 11.6. Patient appears comfortable. No mention of pain on 04/20.  - Tylenol 650mg PO q4h PRN    Consults: Internal medicine, appreciate recs. Signed off 04/19/17. Re-consulted 04/23.      Legal Status: Voluntary under stay of commitment      Safety Assessment:   Checks: Status 15  Precautions: substance withdrawal, suicide  Pt has not required locked seclusion or restraints in the past 24 hours to maintain safety, please refer to RN documentation for further details.    The risks, benefits, alternatives and side effects have been discussed and are understood by the patient and other caregivers.     Anticipated Disposition/Discharge Date: Anticipate discharge on 05/05 to [facility] in [place], [place]. Has been accepted, awaiting final date.    ------------------------------------------------    Scribed by Jyoti Hough, MS3, for George Torres DO, PGY1, on 05/04/17 at 12:25pm.    I, George Torres DO, have reviewed and edited the documentation recorded by the scribe.  This documentation accurately reflects the services I personally performed and treatment decisions made by me in consultation with the attending physician.    George Torres DO  Resident Psychiatrist, PGY-1  Pager: [phone]    Psychiatry Attending Attestation:  This patient has been seen and evaluated by me, Fernando Crawford M.D.  The patient's condition and treatment plan were discussed with the resident, and care coordinated with the CTC and RN. I reviewed, edited and agree with the findings and plan in this note.     Fernando Crawford,  "M.D.   of Psychiatry  Interim History:   The patient's care was discussed with the treatment team and chart notes were reviewed.     Sleep: 6.25 hr  Vitals: mild HTN to 146/91, bradycardic to 56. Remaining vitals within normal limits.  Meds: compliant. PRNs in past 24 hr: trazodone 25 mg x1, tylenol x1  Staff notes: reviewed     Subjective:  Bryon reports his mood as \"good\" today. He was granted a stay of commitment at his court hearing this morning. He is optimistic that his plan to discharge to [facility] tomorrow will be possible. He is also optimistic that this round of chemical dependency treatment will be successful and is making efforts to make that happen. He says, \"It's going to work because I want it to work.\" He also is planning to complete aftercare (following dishcarge from CD treatment) before returning to work this time.  He notes that in the past he did not do this; rather, he began work and just tried to squeeze in aftercare. This time it is going to be his priority. Bryon mentions that he has continued frequent communication with his family and that they are happy with this plan as well.    Bryon denies any adverse medication effects. He notes he is still somewhat sleepy, but he relates this to being bored on the unit for numerous days in a row.    Review of systems:      The Review of Systems is negative other than noted above     Medications:    Scheduled:    multivitamin, therapeutic  1 tablet Oral Daily     cholecalciferol  2,000 Units Oral Daily     amLODIPine  10 mg Oral Daily     lisinopril (PRINIVIL/ZESTRIL) tablet 40 mg  40 mg Oral Daily     atorvastatin  10 mg Oral Daily     labetalol  200 mg Oral BID     risperiDONE (risperDAL) tablet 1 mg  1 mg Oral At Bedtime     ranitidine  150 mg Oral BID     aspirin  81 mg Oral Daily       PRN:  acetaminophen, albuterol, EPINEPHrine, hydrOXYzine, traZODone, potassium chloride, potassium chloride, " "albuterol    Allergies:     Allergies   Allergen Reactions     Banana Anaphylaxis      Psychiatric Examination:   Vital signs:  Temp: 96.3  F (35.7  C) Temp src: Tympanic BP: (!) 140/91 Pulse: 56   Resp: 16         Weight: 106.6 kg (235 lb)  Estimated body mass index is 37.93 kg/(m^2) as calculated from the following:    Height as of 4/12/17: 1.676 m (5' 6\").    Weight as of this encounter: 106.6 kg (235 lb).    Appearance: awake, alert, adequately groomed and dressed in casual clothing, proper use of jewlery  Attitude: cooperative  Eye Contact: good  Mood: \"good\"  Affect: mood congruent, bright, smiles appropriately  Speech: clear, coherent  Psychomotor Behavior: no evidence of tardive dyskinesia, dystonia, or tics  Thought Process: logical, linear and goal oriented   Associations: no loose associations  Thought Content: no evidence of suicidal ideation or homicidal ideation, no evidence of psychotic thought, no auditory hallucinations present and no visual hallucinations present  Insight: good  Judgment: fair  Oriented to: time, person, and place  Attention Span and Concentration: intact  Recent and Remote Memory: intact  Language: fluent English with appropriate vocabulary and syntax  Fund of Knowledge: appropriate  Muscle Strength and Tone: normal  Gait and Station: normal     Labs:   No new lab results.  No labs pending.    Antipsychotic Labs:        Recent Labs   Lab Test 04/13/17  0723 08/19/16  0718 05/25/16  0733   CHOL 213* 274* 177   TRIG 179* 181* 151*   * 183* 98   HDL 75 55 49            Recent Labs   Lab Test 04/17/17  0501 04/12/17  0414 09/21/16  0711   * 132* 80            Recent Labs   Lab Test 04/17/17  0501 04/12/17  0414 09/05/16  0926   WBC 11.6* 11.3* 8.8   ANEU 7.2 9.0* 5.9   HGB 13.2* 13.4 12.9*   * 395 329              "

## 2017-05-04 NOTE — PROGRESS NOTES
05/04/17 1059   Behavioral Health   Hallucinations denies / not responding to hallucinations   Thinking intact   Orientation person: oriented;place: oriented;date: oriented   Insight insight appropriate to situation   Judgement (improved )   Eye Contact at examiner   Affect full range affect   Mood mood is calm   Physical Appearance/Attire attire appropriate to age and situation   Hygiene well groomed   Suicidality (denies )   Self Injury (none)   Activity (visible on unit )   Speech clear;coherent   Psychomotor / Gait balanced;steady   Activities of Daily Living   Hygiene/Grooming independent   Oral Hygiene independent   Dress street clothes   Room Organization independent   Pt. At court this am, came back around 10:30. Pt said court went as expected and plans to pursue CD Tx at [facility]. Pt affect bright, social with peers and staff. Pt attended some programming. PT endorses a stable mood, denies any active SI or safety concerns.

## 2017-05-04 NOTE — PROGRESS NOTES
Pt was social in milieu for most of evening watching TV and talking with peers. Pt became irritable at one point when patients were not allowed to watch the news (because of story regarding [place] pt elopement) and used profanity regarding staff member. Rest of shift was unremarkable and pt went to bed around 2100. Pt has court tomorrow morning and states no concerns with this. Pt is hopeful to begin CD treatment soon. No mental health concerns stated or observed.      05/03/17 2100   Behavioral Health   Hallucinations denies / not responding to hallucinations   Thinking intact   Orientation person: oriented;place: oriented;date: oriented;time: oriented   Memory baseline memory   Insight admits / accepts   Judgement intact   Eye Contact at examiner   Affect full range affect   Mood mood is calm   Physical Appearance/Attire attire appropriate to age and situation   Hygiene well groomed   Suicidality other (see comments)  (denies)   Self Injury other (see comment)  (denies)   Activity other (see comment)  (social in milieu)   Speech clear;coherent   Medication Sensitivity no stated side effects;no observed side effects   Psychomotor / Gait balanced;steady   Psycho Education   Type of Intervention 1:1 intervention   Response participates, initiates socially appropriate   Hours 0.5   Treatment Detail check in    Group Therapy Session   Group Attendance attended group session   Time Session Began 1700   Time Session Ended 1730   Total Time (minutes) 30   Group Type community   Patient Participation/Contribution cooperative with task   Activities of Daily Living   Hygiene/Grooming with supervision   Oral Hygiene independent   Dress independent   Laundry with supervision   Room Organization independent   Activity   Activity Level of Assistance independent

## 2017-05-05 PROCEDURE — 25000132 ZZH RX MED GY IP 250 OP 250 PS 637: Performed by: NURSE PRACTITIONER

## 2017-05-05 PROCEDURE — 25000132 ZZH RX MED GY IP 250 OP 250 PS 637: Performed by: PSYCHIATRY & NEUROLOGY

## 2017-05-05 PROCEDURE — 25000132 ZZH RX MED GY IP 250 OP 250 PS 637: Performed by: PHYSICIAN ASSISTANT

## 2017-05-05 PROCEDURE — 25000132 ZZH RX MED GY IP 250 OP 250 PS 637: Performed by: STUDENT IN AN ORGANIZED HEALTH CARE EDUCATION/TRAINING PROGRAM

## 2017-05-05 PROCEDURE — 12400001 ZZH R&B MH UMMC

## 2017-05-05 PROCEDURE — 97150 GROUP THERAPEUTIC PROCEDURES: CPT | Mod: GO

## 2017-05-05 PROCEDURE — 99232 SBSQ HOSP IP/OBS MODERATE 35: CPT | Mod: GC | Performed by: PSYCHIATRY & NEUROLOGY

## 2017-05-05 PROCEDURE — 90853 GROUP PSYCHOTHERAPY: CPT

## 2017-05-05 RX ORDER — TETRAHYDROZOLINE HCL 0.05 %
1-2 DROPS OPHTHALMIC (EYE) 4 TIMES DAILY PRN
Status: DISCONTINUED | OUTPATIENT
Start: 2017-05-05 | End: 2017-05-08 | Stop reason: HOSPADM

## 2017-05-05 RX ADMIN — THERA TABS 1 TABLET: TAB at 08:48

## 2017-05-05 RX ADMIN — LABETALOL HCL 200 MG: 200 TABLET, FILM COATED ORAL at 08:47

## 2017-05-05 RX ADMIN — LABETALOL HCL 200 MG: 200 TABLET, FILM COATED ORAL at 21:08

## 2017-05-05 RX ADMIN — RANITIDINE HYDROCHLORIDE 150 MG: 150 TABLET, FILM COATED ORAL at 08:47

## 2017-05-05 RX ADMIN — AMLODIPINE BESYLATE 10 MG: 2.5 TABLET ORAL at 08:48

## 2017-05-05 RX ADMIN — MELATONIN TAB 3 MG 6 MG: 3 TAB at 21:14

## 2017-05-05 RX ADMIN — LISINOPRIL 40 MG: 20 TABLET ORAL at 08:48

## 2017-05-05 RX ADMIN — ASPIRIN 81 MG CHEWABLE TABLET 81 MG: 81 TABLET CHEWABLE at 08:47

## 2017-05-05 RX ADMIN — ATORVASTATIN CALCIUM 10 MG: 10 TABLET, FILM COATED ORAL at 21:07

## 2017-05-05 RX ADMIN — VITAMIN D, TAB 1000IU (100/BT) 2000 UNITS: 25 TAB at 08:48

## 2017-05-05 RX ADMIN — RANITIDINE HYDROCHLORIDE 150 MG: 150 TABLET, FILM COATED ORAL at 17:56

## 2017-05-05 RX ADMIN — Medication 25 MG: at 21:12

## 2017-05-05 RX ADMIN — RISPERIDONE 1 MG: 1 TABLET ORAL at 21:12

## 2017-05-05 ASSESSMENT — ACTIVITIES OF DAILY LIVING (ADL)
ORAL_HYGIENE: INDEPENDENT
LAUNDRY: WITH SUPERVISION
GROOMING: INDEPENDENT
ORAL_HYGIENE: INDEPENDENT
GROOMING: HANDWASHING;INDEPENDENT
DRESS: INDEPENDENT
DRESS: STREET CLOTHES;INDEPENDENT
LAUNDRY: WITH SUPERVISION

## 2017-05-05 NOTE — PLAN OF CARE
Problem: Depressive Symptoms  Goal: Depressive Symptoms  Signs and symptoms of listed problems will be absent or manageable.  -pt will remain safe without any self harm during hospitalization.  -pt will have decreased thoughts of self harm and or suicidal ideation.  -pt will report improved sleep.  -pt will have adequate daily oral intake.  -pt will have improvement in self care and person hygiene.  -pt will spend time out of their room.  -pt will express decrease feelings of hopelessness.   Outcome: Improving  Bryon active on unit throughout day-social with staff and peers-again contacted outpt resources in attempt to arrange transfer to [facility] program-waiting insurance approval for entrance into tx program-appropriate with staff and peers

## 2017-05-05 NOTE — PROGRESS NOTES
"     ----------------------------------------------------------------------------------------------------------  [facility], [place]   Psychiatric Progress Note     Assessment    Presentation: Bryon Alonzo is a 32 year old male with a history of substance use disorder (crack, cocaine), Bipolar II, who presents with cocaine and crack, stating \"I wanted to use more and more and didn't care if I  because of it\". He was discharged 17 for a similar hospitalization. The discharge plan three days ago was for Bryon to take a cab to get a Rule 25 assessment. He expressed \" I like cocaine and the way it makes me feel\" and that \"the moment I got in that cab to my Rule 25, I decided 'no'\". Over the past three days he reports of spending $4000 on 4-5oz of crack and cocaine. He states that \"I haven't slept or been sober since the moment I left the hospital\". He also has not taken any of his medications during this time. He decided to return to the hospital today because the \"demon inside me has been wasted to nothing\". He thinks this hospitalization will be different from the last because he is \"ready to be clean and go to treatment\".     Diagnostic Impression: Patient has a long history of cocaine /crack use disorder. He gets suicidal while on cocaine and after a brief hospitalization and stabilization , restarts the use. Patient is diagnosed with cocaine use disorder. Diagnosis of bipolar II is questionable and the impact of possible Bipolar disorder on his current clinical picture is not known.      Hospital course: Bryon Alonzo was admitted to station [facility] as a voluntary patient and PTA meds restarted. He stated that he never took the medications after discharge. He agreed with team on petitioning for commitment and expressed his wish to get into a  treatment facility. WIthin 2 days he was denying, and did not appear to be objectively experiencing, hallucinations, paranoia and " SI. He insisted that he now could be trusted and blamed the treatment team for recommending [address] if we really had a better plan for him, when in fact, other options were quite limited. He was flippant and also portrayed him self as powerless to resist crack use because of the large amount of money he has possession of. He continued to express desire to get CD treatment throughout his hospitalization. Petition for commitment supported by  on 04/24. Notified on 05/03 of acceptance to [facility] in [place], [place].  Later that day the team was informed by the [facility]  that the [facility] is planning to advocate that he be [facility] of [facility] at his 05/04 petition hearing.  After learning of this, the patient spoke with his  regarding this development and stated that he and his  would advocate for discharge to [facility] with the treatment team's blessing. On 05/04, during the hearing the court decided to silvana the requested stay of commitment, and it was determined that the patient would indeed discharge to [facility] at the earliest possible date.     Medical course Patient with history of HTN, was hypertensive at unit and the BP readings are downtrending. He also initially expressed some chest pain at ED when the EKG and Troponin were obtained and were unchanged from past. At the unit patient did not report any chest pain. He had some shortness of breath for which he was given albuterol (helped). On 04/19, he noted lower right quadrant abdominal pain that is constant and sharp in nature and worsens with palpation. He was given Tylenol. The pain was not a concern the following day. On 04/23, he mentioned persistent pain in his right great toe in the 1st MTP joint region, medicine was consulted and diagnosed sprain vs. strain. Recommended treatment with rest, ice, Tylenol. Toe pain noted to be resolved on 04/28. Resumed PTA multivitamin and  vitamin D3 on 05/03 per patient's request.     Plan      Principal Diagnosis:   #Cocaine/crack intoxication and use disorder  #Possible cyclothymia or BP II disorder  - court on 05/04 granted stay of commitment  - discharge to [facility] in [place], [place] possibly as soon as 05/05      Medications:     Continue:   -Risperidone 1 mg QHS  -Hydroxyzine 25-50 q4 hrs PRN  -Trazodone 25 mg qHS PRN    Start:  -Melatonin 6 mg PRN    Laboratory/Imaging: Admission labs were unremarkable/unchanged from past.        Consults:   Internal Medicine - recs appreciated, signed off 04/19. Re-consulted on 04/24, following peripherally.      Patient will be treated in therapeutic milieu with appropriate individual and group therapies as described.      Medical diagnoses to be addressed this admission:    # h/o non-ischemic cardiomyopathy: Patient reports history of chest pain, now respolved. Troponin I chronically elevated , unchanged from base line. EKG NSR. Medicine recommends not rechecking troponin levels.  - ASA tab 81 mg daily     # HTN/CKD: goal BP <130/90: BP has been consistently elevated to 150s/90s. On 04/23, BP readings quite labile. Occasional readings within normal range, but there are also peak recordings in 180s/100s.  - continue Labetalol 200 mg BID  - continue Lisinopril to 40 mg daily  - continue amlodipine 10 mg daily, may titrate higher  - internal medicine following BP peripherally  - avoid nephrotoxins  - f/u with outpatient nephrologist (scheduled)  - f/u BP as outpatient with primary care    #hyperlipidemia: results suggestive of hyperlipidemia. Patient previously on Atorvastatin, unclear why this was stopped. Has been restarted this hospitalization.  - Atorvastatin 10mg    # asthma  - albuterol PRN      #GERD:  - continue Zantac BID    #right great toe sprain/strain, resolved: internal medicine consulted, believes is sprain or strain. Recommend tylenol, ice, rest.  - tylenol PRN  - rest  - ice  pack    #new onset RLQ abdominal pain, resolved: Patient reports began 04/18 in the afternoon. It is sharp and constant in nature, worsens to palpation. This is a new pain that he has not experienced before. No bowel/bladder symptoms. Nothing is noted to make it better or worse. First dose of Tylenol did not alleviate the pain. No history of abdominal surgery. CBC on 04/17 showed mildly elevated WBC at 11.6. Patient appears comfortable. No mention of pain on 04/20.  - Tylenol 650mg PO q4h PRN    Consults: Internal medicine, appreciate recs. Signed off 04/19/17. Re-consulted 04/23.      Legal Status: Voluntary under stay of commitment      Safety Assessment:   Checks: Status 15  Precautions: substance withdrawal, suicide  Pt has not required locked seclusion or restraints in the past 24 hours to maintain safety, please refer to RN documentation for further details.    The risks, benefits, alternatives and side effects have been discussed and are understood by the patient and other caregivers.     Anticipated Disposition/Discharge Date: Anticipate discharge on 05/08 to [facility] treatment in [place], [place]. Has been accepted, awaiting final paperwork.    ------------------------------------------------    Patient has been seen and evaluated by George ac DO, Psychiatry Resident, PGY1.    George Torres DO  Resident Psychiatrist, PGY-1  [facility]    Psychiatry Attending Attestation:  This patient has been seen and evaluated by Fernando ac M.D.  The patient's condition and treatment plan were discussed with the resident, and care coordinated with the CTC and RN. I reviewed, edited and agree with the findings and plan in this note.     Fernando Crawford M.D.   of Psychiatry    Interim History:   The patient's care was discussed with the treatment team and chart notes were reviewed.     Sleep: 6.25 hr  PRN's: Trazodone 25 mg x1, melatonin 6 mg x1  Staff notes: Headed to CD at [facility] [place],  "happy about stay of committment.  Affect bright, social with peers.     Treatment Meeting: Bryon agreed to meet with the team in the conference room this AM.  He was smiling and stated he was happy and excited to be leaving station 22 for [facility].  We reviewed that some papers still need to be signed/filed for funding and he stated that he was planning to make a few calls after our meeting.  We talked about his prospects and he stated \"Oh, I'm going to go though with treatment, then I'm going to go through with aftercare.  I'm under a court order and I'd hate to be under a full commitment.\"  He elaborated on his need to begin taking responsibility for his sobriety and shared with the group the death of his brother due to drugs.  His health vulnerabilities were reviewed and he endorsed a full understanding of this.  He was reminded of the need to elaborate sober/healthy ways to celebrate life's successes.  As the meeting ended the patient was wished luck with his treatment.    As the day progressed it became evident that the paperwork had not been completed on time for the patient to discharge today and will likely discharge to [facility] on Monday 5/8.    Review of systems:      The Review of Systems is negative other than noted above     Medications:    Scheduled:    multivitamin, therapeutic  1 tablet Oral Daily     cholecalciferol  2,000 Units Oral Daily     amLODIPine  10 mg Oral Daily     lisinopril (PRINIVIL/ZESTRIL) tablet 40 mg  40 mg Oral Daily     atorvastatin  10 mg Oral Daily     labetalol  200 mg Oral BID     risperiDONE (risperDAL) tablet 1 mg  1 mg Oral At Bedtime     ranitidine  150 mg Oral BID     aspirin  81 mg Oral Daily       PRN:  tetrahydrozoline, melatonin, acetaminophen, albuterol, EPINEPHrine, hydrOXYzine, traZODone, potassium chloride, potassium chloride, albuterol    Allergies:     Allergies   Allergen Reactions     Banana Anaphylaxis      Psychiatric Examination:   Vital " "signs:  Temp: 98  F (36.7  C)   BP: 124/64 Pulse: 69 Heart Rate: 60 Resp: 14         Weight: 106.6 kg (235 lb)  Estimated body mass index is 37.93 kg/(m^2) as calculated from the following:    Height as of 4/12/17: 1.676 m (5' 6\").    Weight as of this encounter: 106.6 kg (235 lb).    Appearance: awake, alert, adequately groomed and dressed in casual clothing, proper use of jewlery  Attitude: cooperative  Eye Contact: good  Mood: \"good\"  Affect: mood congruent, bright, smiles appropriately  Speech: clear, coherent  Psychomotor Behavior: no evidence of tardive dyskinesia, dystonia, or tics  Thought Process: logical, linear and goal oriented   Associations: no loose associations  Thought Content: no evidence of suicidal ideation or homicidal ideation, no evidence of psychotic thought, no auditory hallucinations present and no visual hallucinations present  Insight: good  Judgment: fair  Oriented to: time, person, and place  Attention Span and Concentration: intact  Recent and Remote Memory: intact  Language: fluent English with appropriate vocabulary and syntax  Fund of Knowledge: appropriate  Muscle Strength and Tone: normal  Gait and Station: normal     Labs:   No new lab results.  No labs pending.    Antipsychotic Labs:        Recent Labs   Lab Test 04/13/17  0723 08/19/16  0718 05/25/16  0733   CHOL 213* 274* 177   TRIG 179* 181* 151*   * 183* 98   HDL 75 55 49            Recent Labs   Lab Test 04/17/17  0501 04/12/17  0414 09/21/16  0711   * 132* 80            Recent Labs   Lab Test 04/17/17  0501 04/12/17  0414 09/05/16  0926   WBC 11.6* 11.3* 8.8   ANEU 7.2 9.0* 5.9   HGB 13.2* 13.4 12.9*   * 395 329              "

## 2017-05-05 NOTE — PROGRESS NOTES
Attended all scheduled OT groups  Quickly became engaged in group activity focused on problem solving. Affect bright and responsive and encouraging with peers. Stated he won't be discharged today and appeared to handle frustration well.  .

## 2017-05-05 NOTE — PROGRESS NOTES
05/04/17 1906   Behavioral Health   Hallucinations denies / not responding to hallucinations   Thinking intact   Orientation person: oriented;place: oriented;date: oriented   Insight insight appropriate to situation   Judgement (improved )   Eye Contact at examiner   Affect full range affect   Mood mood is calm   Physical Appearance/Attire attire appropriate to age and situation   Hygiene well groomed   Suicidality (denies )   Self Injury (none)   Activity (visible on unit )   Speech clear;coherent   Psychomotor / Gait balanced;steady   Activities of Daily Living   Hygiene/Grooming independent   Oral Hygiene independent   Dress street clothes   Room Organization independent   Pt. Visible on unit, attended community meeting. Pt affect bright, social with peers and staff. Pt endorses stable mood, denies any active SI or safety concerns at this time. Pt is hopeful for d/c to [facility] tomorrow or early next week.

## 2017-05-06 PROCEDURE — 25000132 ZZH RX MED GY IP 250 OP 250 PS 637: Performed by: STUDENT IN AN ORGANIZED HEALTH CARE EDUCATION/TRAINING PROGRAM

## 2017-05-06 PROCEDURE — 25000132 ZZH RX MED GY IP 250 OP 250 PS 637: Performed by: PHYSICIAN ASSISTANT

## 2017-05-06 PROCEDURE — 25000132 ZZH RX MED GY IP 250 OP 250 PS 637: Performed by: NURSE PRACTITIONER

## 2017-05-06 PROCEDURE — 25000132 ZZH RX MED GY IP 250 OP 250 PS 637: Performed by: PSYCHIATRY & NEUROLOGY

## 2017-05-06 PROCEDURE — 90853 GROUP PSYCHOTHERAPY: CPT

## 2017-05-06 PROCEDURE — 12400001 ZZH R&B MH UMMC

## 2017-05-06 RX ADMIN — ATORVASTATIN CALCIUM 10 MG: 10 TABLET, FILM COATED ORAL at 21:24

## 2017-05-06 RX ADMIN — RANITIDINE HYDROCHLORIDE 150 MG: 150 TABLET, FILM COATED ORAL at 17:35

## 2017-05-06 RX ADMIN — LABETALOL HCL 200 MG: 200 TABLET, FILM COATED ORAL at 09:09

## 2017-05-06 RX ADMIN — MELATONIN TAB 3 MG 6 MG: 3 TAB at 21:25

## 2017-05-06 RX ADMIN — THERA TABS 1 TABLET: TAB at 09:10

## 2017-05-06 RX ADMIN — TETRAHYDROZOLINE HCL 2 DROP: 0.05 LIQUID OPHTHALMIC at 06:57

## 2017-05-06 RX ADMIN — RISPERIDONE 1 MG: 1 TABLET ORAL at 21:25

## 2017-05-06 RX ADMIN — RANITIDINE HYDROCHLORIDE 150 MG: 150 TABLET, FILM COATED ORAL at 09:10

## 2017-05-06 RX ADMIN — VITAMIN D, TAB 1000IU (100/BT) 2000 UNITS: 25 TAB at 09:09

## 2017-05-06 RX ADMIN — AMLODIPINE BESYLATE 10 MG: 2.5 TABLET ORAL at 09:08

## 2017-05-06 RX ADMIN — LABETALOL HCL 200 MG: 200 TABLET, FILM COATED ORAL at 21:24

## 2017-05-06 RX ADMIN — Medication 25 MG: at 21:25

## 2017-05-06 RX ADMIN — ASPIRIN 81 MG CHEWABLE TABLET 81 MG: 81 TABLET CHEWABLE at 09:09

## 2017-05-06 RX ADMIN — ALBUTEROL SULFATE 2 PUFF: 90 AEROSOL, METERED RESPIRATORY (INHALATION) at 15:13

## 2017-05-06 RX ADMIN — LISINOPRIL 40 MG: 20 TABLET ORAL at 09:09

## 2017-05-06 ASSESSMENT — ACTIVITIES OF DAILY LIVING (ADL)
LAUNDRY: WITH SUPERVISION
ORAL_HYGIENE: INDEPENDENT
DRESS: STREET CLOTHES
GROOMING: INDEPENDENT
GROOMING: INDEPENDENT
LAUNDRY: UNABLE TO COMPLETE
ORAL_HYGIENE: INDEPENDENT
DRESS: SCRUBS (BEHAVIORAL HEALTH)

## 2017-05-06 NOTE — PLAN OF CARE
Problem: Depressive Symptoms  Goal: Depressive Symptoms  Signs and symptoms of listed problems will be absent or manageable.  -pt will remain safe without any self harm during hospitalization.  -pt will have decreased thoughts of self harm and or suicidal ideation.  -pt will report improved sleep.  -pt will have adequate daily oral intake.  -pt will have improvement in self care and person hygiene.  -pt will spend time out of their room.  -pt will express decrease feelings of hopelessness.   Outcome: Jorge Slater attended community meeting and participated. He has full range of affect. Took the news well that his insurance needs to be approved before he can go to CD treatment. Social with peers. States that he has a positive outlook and intends to get on with his life.

## 2017-05-06 NOTE — PROGRESS NOTES
Pt. Was visible in the milieu, social and engaged with others. Participated in groups today. No significant events to report.       05/06/17 1414   Behavioral Health   Hallucinations denies / not responding to hallucinations   Thinking intact   Orientation person: oriented;place: oriented;date: oriented   Memory baseline memory   Insight admits / accepts   Judgement intact   Eye Contact at examiner   Affect full range affect   Mood mood is calm   Physical Appearance/Attire attire appropriate to age and situation   Hygiene well groomed   Suicidality other (see comments)  (denies)   Self Injury other (see comment)  (denies)   Activity other (see comment)  (Present in the milieu, social and engaged with others.)   Speech clear;coherent   Medication Sensitivity no stated side effects   Psychomotor / Gait balanced;steady   Coping/Psychosocial   Verbalized Emotional State acceptance;hopefulness   Psycho Education   Type of Intervention 1:1 intervention   Response participates, initiates socially appropriate   Hours 0.5   Treatment Detail 1:1 check-in   Activities of Daily Living   Hygiene/Grooming independent   Oral Hygiene independent   Dress street clothes   Laundry unable to complete   Room Organization independent   Behavioral Health Interventions   Depression maintain safety precautions;maintain safe secure environment;assist patient in developing safety plan;assist patient in following safety plan;provide emotional support;establish therapeutic relationship;assist with developing and utilizing healthy coping strategies;build upon strengths;assess patient response to medication;assess medication adherance;monitor need for prn medication;monitor confusion, memory loss, decision making ability and reorient / intervene as needed   Social and Therapeutic Interventions (Depression) encourage effective boundaries with peers;encourage participation in therapeutic groups and milieu activities

## 2017-05-07 PROCEDURE — 25000132 ZZH RX MED GY IP 250 OP 250 PS 637: Performed by: PHYSICIAN ASSISTANT

## 2017-05-07 PROCEDURE — 25000132 ZZH RX MED GY IP 250 OP 250 PS 637: Performed by: STUDENT IN AN ORGANIZED HEALTH CARE EDUCATION/TRAINING PROGRAM

## 2017-05-07 PROCEDURE — 25000132 ZZH RX MED GY IP 250 OP 250 PS 637: Performed by: PSYCHIATRY & NEUROLOGY

## 2017-05-07 PROCEDURE — 12400001 ZZH R&B MH UMMC

## 2017-05-07 PROCEDURE — 25000132 ZZH RX MED GY IP 250 OP 250 PS 637: Performed by: NURSE PRACTITIONER

## 2017-05-07 RX ADMIN — AMLODIPINE BESYLATE 10 MG: 2.5 TABLET ORAL at 10:05

## 2017-05-07 RX ADMIN — ATORVASTATIN CALCIUM 10 MG: 10 TABLET, FILM COATED ORAL at 20:43

## 2017-05-07 RX ADMIN — RANITIDINE HYDROCHLORIDE 150 MG: 150 TABLET, FILM COATED ORAL at 18:03

## 2017-05-07 RX ADMIN — LABETALOL HCL 200 MG: 200 TABLET, FILM COATED ORAL at 20:44

## 2017-05-07 RX ADMIN — THERA TABS 1 TABLET: TAB at 10:07

## 2017-05-07 RX ADMIN — LABETALOL HCL 200 MG: 200 TABLET, FILM COATED ORAL at 10:07

## 2017-05-07 RX ADMIN — Medication 25 MG: at 20:44

## 2017-05-07 RX ADMIN — RISPERIDONE 1 MG: 1 TABLET ORAL at 20:43

## 2017-05-07 RX ADMIN — VITAMIN D, TAB 1000IU (100/BT) 2000 UNITS: 25 TAB at 10:06

## 2017-05-07 RX ADMIN — MELATONIN TAB 3 MG 6 MG: 3 TAB at 20:44

## 2017-05-07 RX ADMIN — RANITIDINE HYDROCHLORIDE 150 MG: 150 TABLET, FILM COATED ORAL at 10:07

## 2017-05-07 RX ADMIN — ASPIRIN 81 MG CHEWABLE TABLET 81 MG: 81 TABLET CHEWABLE at 10:06

## 2017-05-07 RX ADMIN — LISINOPRIL 40 MG: 20 TABLET ORAL at 10:07

## 2017-05-07 RX ADMIN — RANITIDINE HYDROCHLORIDE 150 MG: 150 TABLET, FILM COATED ORAL at 20:43

## 2017-05-07 ASSESSMENT — ACTIVITIES OF DAILY LIVING (ADL)
GROOMING: INDEPENDENT
GROOMING: INDEPENDENT
ORAL_HYGIENE: INDEPENDENT
LAUNDRY: WITH SUPERVISION
DRESS: STREET CLOTHES

## 2017-05-07 NOTE — PROGRESS NOTES
"Pt was visible in the milieu, and social with peers and staff during this shift. Pt denies SI/SIB, depression, anxiety, and all mental health symptoms. Pt reports, \"I'm doing fine. Just riding the wave. I thoguht I'd get out on Friday but it's okay. Hopefully Monday.\"       05/06/17 2159   Behavioral Health   Hallucinations denies / not responding to hallucinations   Thinking distractable   Orientation person: oriented;place: oriented;date: oriented   Memory baseline memory   Insight poor   Judgement impaired   Eye Contact at examiner   Affect full range affect   Mood mood is calm   Physical Appearance/Attire attire appropriate to age and situation   Hygiene well groomed   Suicidality other (see comments)  (Denies)   Self Injury other (see comment)  (Denies)   Activity other (see comment)  (Visible in milieu, social with peers and staff)   Speech clear;coherent   Medication Sensitivity no stated side effects   Psychomotor / Gait balanced;steady   Psycho Education   Type of Intervention 1:1 intervention   Response participates, initiates socially appropriate   Hours 0.5   Treatment Detail (Ways to [place], MH Check-in)   Activities of Daily Living   Hygiene/Grooming independent   Oral Hygiene independent   Dress scrubs (behavioral health)   Laundry with supervision   Room Organization independent   Behavioral Health Interventions   Depression maintain safety precautions;monitor need to revise level of observation;maintain safe secure environment;provide emotional support;establish therapeutic relationship;assist with developing and utilizing healthy coping strategies;build upon strengths   Social and Therapeutic Interventions (Depression) encourage socialization with peers;encourage effective boundaries with peers;encourage participation in therapeutic groups and milieu activities     "

## 2017-05-07 NOTE — PLAN OF CARE
Problem: Depressive Symptoms  Goal: Depressive Symptoms  Signs and symptoms of listed problems will be absent or manageable.  -pt will remain safe without any self harm during hospitalization.  -pt will have decreased thoughts of self harm and or suicidal ideation.  -pt will report improved sleep.  -pt will have adequate daily oral intake.  -pt will have improvement in self care and person hygiene.  -pt will spend time out of their room.  -pt will express decrease feelings of hopelessness.   Bryon states he is patiently biding his time here awaiting the financing to come through.  He clearly described that [facility] was no longer a  in [place] and he temporarily must go with a  [facility] before returning to [facility] which he has had before.

## 2017-05-07 NOTE — PROGRESS NOTES
Brief Medicine Note, 5/7/17:    Internal Medicine following for HTN management. .  Patient is a 32 year old male with past medical history significant for substance abuse (crack, cocaine) and bipolar II disorder admitted for suicidal ideation on 4/17/17.     /75 this am.  Continue [id number] PO daily, Labetalol 200mg BID, and Lisinopril 40mg daily.     Medicine will sign off, no further recommendations or adjustments at this time.  Please feel free to reconsult if patient's symptoms worsen or if new problems arise.  Please contact us if assistance needed with planning discharge medications.  Thank you for this consult.      Francine Potts CNP  Hospitalist Service   Pager: [phone]

## 2017-05-08 VITALS
SYSTOLIC BLOOD PRESSURE: 132 MMHG | WEIGHT: 235 LBS | BODY MASS INDEX: 37.93 KG/M2 | OXYGEN SATURATION: 100 % | HEART RATE: 66 BPM | RESPIRATION RATE: 16 BRPM | DIASTOLIC BLOOD PRESSURE: 85 MMHG | TEMPERATURE: 97.6 F

## 2017-05-08 PROCEDURE — 25000132 ZZH RX MED GY IP 250 OP 250 PS 637: Performed by: NURSE PRACTITIONER

## 2017-05-08 PROCEDURE — 25000132 ZZH RX MED GY IP 250 OP 250 PS 637: Performed by: PSYCHIATRY & NEUROLOGY

## 2017-05-08 PROCEDURE — 25000132 ZZH RX MED GY IP 250 OP 250 PS 637: Performed by: PHYSICIAN ASSISTANT

## 2017-05-08 PROCEDURE — 25000132 ZZH RX MED GY IP 250 OP 250 PS 637: Performed by: STUDENT IN AN ORGANIZED HEALTH CARE EDUCATION/TRAINING PROGRAM

## 2017-05-08 PROCEDURE — 99238 HOSP IP/OBS DSCHRG MGMT 30/<: CPT | Mod: GC | Performed by: PSYCHIATRY & NEUROLOGY

## 2017-05-08 RX ADMIN — VITAMIN D, TAB 1000IU (100/BT) 2000 UNITS: 25 TAB at 09:58

## 2017-05-08 RX ADMIN — THERA TABS 1 TABLET: TAB at 09:59

## 2017-05-08 RX ADMIN — ASPIRIN 81 MG CHEWABLE TABLET 81 MG: 81 TABLET CHEWABLE at 09:58

## 2017-05-08 RX ADMIN — LABETALOL HCL 200 MG: 200 TABLET, FILM COATED ORAL at 09:58

## 2017-05-08 RX ADMIN — RANITIDINE HYDROCHLORIDE 150 MG: 150 TABLET, FILM COATED ORAL at 09:59

## 2017-05-08 RX ADMIN — LISINOPRIL 40 MG: 20 TABLET ORAL at 09:59

## 2017-05-08 RX ADMIN — AMLODIPINE BESYLATE 10 MG: 2.5 TABLET ORAL at 09:59

## 2017-05-08 ASSESSMENT — ACTIVITIES OF DAILY LIVING (ADL)
ORAL_HYGIENE: INDEPENDENT
LAUNDRY: WITH SUPERVISION
GROOMING: INDEPENDENT
DRESS: INDEPENDENT

## 2017-05-08 NOTE — PROGRESS NOTES
05/07/17 2025   Behavioral Health   Hallucinations denies / not responding to hallucinations   Thinking intact   Insight insight appropriate to situation   Judgement intact   Eye Contact at examiner   Affect full range affect   Mood mood is calm   Physical Appearance/Attire attire appropriate to age and situation   Hygiene well groomed   Speech clear;coherent   Psycho Education   Type of Intervention structured groups   Response participates, initiates socially appropriate   Group Therapy Session   Group Attendance attended group session   Activities of Daily Living   Hygiene/Grooming independent   Pt is visible in milieu watching tv and social with peers. Pt denies SI and hallucinations. Pt hopes for discharge this week.

## 2017-05-08 NOTE — DISCHARGE INSTRUCTIONS
Behavioral Discharge Planning and Instructions      Summary:  You were admitted on 4/17/2017 for Suicidal Ideations in the context of cocaine use.  You were treated by Dr. Fernando Crawford MD and discharged on 5/8/2017 from [facility]. You have went through the court process and have been placed on a Stay of Commitment in [facility] and will be attending chemically dependency treatment at [facility].       Main Diagnosis: Bipolar Disorder     Health Care Follow-up Appointments:     Dr. Oshea- Monday June 12th 9:30  [facility] [address], [address] [place] [address].   Phone: [phone] Fax: [phone]    Attend all scheduled appointments with your outpatient providers. Call at least 24 hours in advance if you need to reschedule an appointment to ensure continued access to your outpatient providers.   Major Treatments, Procedures and Findings:  You were provided with: assessed for medical stability, medication evaluation and/or management, CD evaluation/assessment and milieu management    Symptoms to Report: losing more sleep, mood getting worse or thoughts of suicide    Early warning signs can include: increased depression or anxiety increased thoughts or behaviors of suicide or self-harm  increased unusual thinking, such as paranoia or hearing voices    Safety and Wellness:  Take all medicines as directed.  Make no changes unless your doctor suggests them.      Follow treatment recommendations.  Refrain from alcohol and non-prescribed drugs.  If there is a concern for safety, call 911.    Resources:   Crisis Intervention: 574.987.9747 or 102-334-8644 (TTY: 840.153.2101).  Call anytime for help.  [employer] on Mental Illness (www.[place].nba.org): 247.236.9857 or 640-934-1293.  Suicide Awareness Voices of Education (SAVE) (www.save.org): 797-614-YWRZ (8753)  National Suicide Prevention Line (www.mentalhealthmn.org): 565-305-BVMH (8929)  Mental Health  "Consumer/Survivor Network of [place] (www.mhcsn.net): 197-026-6766 or 589-190-0111  [facility] Crisis (COPE) Response - Adult 967 856-8539  Crisis text line: Text \"START\" to 943-866. Free, confidential, 24/7.  Crisis Intervention: 540.463.8743 or 333-523-3889. Call anytime for help.   [facility] Crisis Team - Child: [phone]    The treatment team has appreciated the opportunity to work with you.     If you have any questions or concerns our unit number is [phone]  You may be receiving a follow-up phone call within the next three days from a representative from behavioral health.            "

## 2017-05-08 NOTE — PLAN OF CARE
Problem: Depressive Symptoms  Goal: Depressive Symptoms  Signs and symptoms of listed problems will be absent or manageable.  -pt will remain safe without any self harm during hospitalization.  -pt will have decreased thoughts of self harm and or suicidal ideation.  -pt will report improved sleep.  -pt will have adequate daily oral intake.  -pt will have improvement in self care and person hygiene.  -pt will spend time out of their room.  -pt will express decrease feelings of hopelessness.   Outcome: Adequate for Discharge Date Met:  05/08/17  Barber discharged 1135 care of transport staff to [facility] program-reviewed medication regimen and continued tx plans and verbalized understanding-has copy of discharge instructions and 30 day supply of medications in his possession

## 2017-05-08 NOTE — DISCHARGE SUMMARY
"    ----------------------------------------------------------------------------------------------------------  [facility], [place]   Discharge Summary      Bryon Alonzo MRN# [id number]   Age: 32 year old YOB: 1985     Date of Admission:  2017  Date of Discharge:  2017 11:38 AM  Admitting Physician:  Fernando Crawford MD  Discharge Physician:  Fernando Crawford MD         Event Leading to Hospitalization:     Bryon Alonzo is a 32 year old male with a history of substance use disorder (crack, cocaine), Bipolar II, who presents with cocaine and crack, stating \"I wanted to use more and more and didn't care if I  because of it.\" He was discharged 17 for a similar hospitalization. The discharge plan three days ago was for Bryon to take a cab to get a Rule 25 assessment. He expressed \"I like cocaine and the way it makes me feel\" and that \"the moment I got in that cab to my Rule 25, I decided 'no.'\" Over the past three days he reports spending $4000 on 4-5 oz of crack and cocaine. He states that \"I haven't slept or been sober since the moment I left the hospital.\" He also has not taken any of his medications during this time. He decided to return to the hospital today because the \"demon inside me has been wasted to nothing.\" He thinks this hospitalization will be different from the last because he is \"ready to be clean and go to treatment.\"       See Admission note by Fernando Crawford MD on 2017 for additional details.          Diagnoses:     Principal Diagnosis: Amphetamine Use Disorder      Medical diagnoses to be addressed this admission:  H/o non-ischemic cardiomyopathy, HTN, CKD, hyperlipidemia, asthma, GERD         Labs:     Results for orders placed or performed during the hospital encounter of 17   CBC with platelets differential   Result Value Ref Range    WBC 11.6 (H) 4.0 - 11.0 10e9/L    RBC Count 4.54 4.4 - 5.9 10e12/L    Hemoglobin " 13.2 (L) 13.3 - 17.7 g/dL    Hematocrit 39.5 (L) 40.0 - 53.0 %    MCV 87 78 - 100 fl    MCH 29.1 26.5 - 33.0 pg    MCHC 33.4 31.5 - 36.5 g/dL    RDW 14.4 10.0 - 15.0 %    Platelet Count 455 (H) 150 - 450 10e9/L    Diff Method Automated Method     % Neutrophils 61.8 %    % Lymphocytes 29.0 %    % Monocytes 5.7 %    % Eosinophils 2.9 %    % Basophils 0.3 %    % Immature Granulocytes 0.3 %    Nucleated RBCs 0 0 /100    Absolute Neutrophil 7.2 1.6 - 8.3 10e9/L    Absolute Lymphocytes 3.4 0.8 - 5.3 10e9/L    Absolute Monocytes 0.7 0.0 - 1.3 10e9/L    Absolute Eosinophils 0.3 0.0 - 0.7 10e9/L    Absolute Basophils 0.0 0.0 - 0.2 10e9/L    Abs Immature Granulocytes 0.0 0 - 0.4 10e9/L    Absolute Nucleated RBC 0.0    Basic metabolic panel   Result Value Ref Range    Sodium 140 133 - 144 mmol/L    Potassium 3.3 (L) 3.4 - 5.3 mmol/L    Chloride 100 94 - 109 mmol/L    Carbon Dioxide 32 20 - 32 mmol/L    Anion Gap 8 3 - 14 mmol/L    Glucose 111 (H) 70 - 99 mg/dL    Urea Nitrogen 17 7 - 30 mg/dL    Creatinine 1.76 (H) 0.66 - 1.25 mg/dL    GFR Estimate 45 (L) >60 mL/min/1.7m2    GFR Estimate If Black 54 (L) >60 mL/min/1.7m2    Calcium 8.6 8.5 - 10.1 mg/dL   Troponin I   Result Value Ref Range    Troponin I ES 0.054 (H) 0.000 - 0.045 ug/L   Drug abuse screen 6 urine (tox)   Result Value Ref Range    Amphetamine Qual Urine  NEG     Negative   Cutoff for a negative amphetamine is 500 ng/mL or less.      Barbiturates Qual Urine  NEG     Negative   Cutoff for a negative barbiturate is 200 ng/mL or less.      Benzodiazepine Qual Urine  NEG     Negative   Cutoff for a negative benzodiazepine is 200 ng/mL or less.      Cannabinoids Qual Urine  NEG     Negative   Cutoff for a negative cannabinoid is 50 ng/mL or less.      Cocaine Qual Urine (A) NEG     Positive   Cutoff for a positive cocaine is greater than 300 ng/mL. This is an unconfirmed   screening result to be used for medical purposes only.      Ethanol Qual Urine  NEG      Negative   Cutoff for a negative urine ethanol is 0.05 g/dL or less      Opiates Qualitative Urine  NEG     Negative   Cutoff for a negative opiate is 300 ng/mL or less.     TSH with free T4 reflex and/or T3 as indicated   Result Value Ref Range    TSH 0.87 0.40 - 4.00 mU/L   Potassium   Result Value Ref Range    Potassium 4.0 3.4 - 5.3 mmol/L   Magnesium   Result Value Ref Range    Magnesium 2.4 (H) 1.6 - 2.3 mg/dL   Creatinine   Result Value Ref Range    Creatinine 1.55 (H) 0.66 - 1.25 mg/dL    GFR Estimate 52 (L) >60 mL/min/1.7m2    GFR Estimate If Black 62 >60 mL/min/1.7m2   EKG 12 lead   Result Value Ref Range    Interpretation ECG Click View Image link to view waveform and result    Internal Medicine Hospitalist Psych Adult IP Consult - GENERAL: Patient to be seen: ASAP within 4 hrs; Call back #: [phone]; sob htn; Consultant may enter orders: Yes    Narrative    Malcolm Dodd PA-C     4/18/2017 12:28 PM    Internal Medicine Initial Visit    Bryon Alonzo MRN# [id number]   Age: 32 year old YOB: 1985   Date of Admission: 4/17/2017     Reason for consult: SOB, HTN       Requesting physician Dr. Borjas      SUBJECTIVE   HPI:   Bryon Alonzo is a 32 year old male with history of morbid   obesity, atypical chest pain, HTN, GERD, mild persistent asthma,   bipolar disorder and cocaine abuse admitted to [facility] for   suicidal ideation and cocaine use.    Patient currently reports occasional shortness of breath. He   thinks it is a combination of asthma and anxiety that has   provoked these episodes. He denies associated chest discomfort or   palpitations. He is not short of breath currently. He denies   wheezing. He reports that he has binged on cocaine and alcohol   since d/c from here on Friday.    Denies HA, nausea, fever, chills, abdominal pain, chest pain,   wheezing, constipation, diarrhea, urinary symptoms, and rashes.     Past Medical History:     Past Medical History:   Diagnosis  "Date     Asthma      Bipolar disorder (H)      Chronic kidney disease      Hyperlipidemia      Hypertension      LVH (left ventricular hypertrophy) 2015     Polysubstance abuse     Methamphetamine, Cocaine, Alcohol, THC, LSD      Reviewed & updated in [employer].     Past Surgical History:      Past Surgical History:   Procedure Laterality Date     NO HISTORY OF SURGERY        Reviewed & updated in Epic.     Medications prior to admission:     Prior to Admission Medications   Prescriptions Last Dose Informant Patient Reported? Taking?   EPINEPHrine 0.3 MG/0.3ML injection 2-pack Unknown at Unknown time    No No   Sig: Inject 0.3 mLs (0.3 mg) into the muscle once as needed for   anaphylaxis   LISINOPRIL PO Past Week at Unknown time  Yes Yes   Sig: Take 30 mg by mouth daily   RISPERIDONE PO Past Week at Unknown time  Yes Yes   Sig: Take 1 mg by mouth At Bedtime   albuterol (ALBUTEROL) 108 (90 BASE) MCG/ACT Inhaler 4/16/2017 at   Unknown time  Yes Yes   Sig: Inhale 2 puffs into the lungs every 4 hours as needed   labetalol (NORMODYNE) 200 MG tablet Past Week at Unknown time    Yes Yes   Sig: Take 200 mg by mouth 2 times daily       Facility-Administered Medications: None         Allergies:     Allergies   Allergen Reactions     Banana Anaphylaxis         Social History:   Tobacco Use: Current smoker  Alcohol Use: Reports drinking \"lots of beer and liquor\"  Illicit Drug Use: Cocaine  STI Testing: Pending     Family History:     Family History   Problem Relation Age of Onset     DIABETES Mother      Hypertension Mother      Coronary Artery Disease Father      KIDNEY DISEASE Father        Reviewed & updated in Epic.     Review of Systems:   Ten point review of systems negative except as stated above in   History of Present Illness.    OBJECTIVE   Physical Exam:   Blood pressure (!) 194/106, recheck 150/70, pulse 74, temperature   97.7  F (36.5  C), temperature source Tympanic, resp. rate 16,   weight 103.4 kg (228 lb), SpO2 100 " %.  General: Alert, awake, NAD.  HEENT: NC/AT. Anicteric sclera. Eyes symmetric and free of   discharge bilaterally. Mucous membranes moist.  Cardiovascular: RRR, S1S2, no murmurs appreciated.  Lungs: Normal respiratory effort on RA. Lungs CTAB without   wheezes, rales or rhonchi.  Abdomen: Soft, non-tender and nondistended with normoactive bowel   sounds.  Extremities: Warm & well perfused. Trace edema in b/l LE.  Skin: No rashes, jaundice, or lesions.  Neurologic: A&O X 3. Answers questions appropriately. Moves all   extremities symmetrically.     Laboratory Data:   CMP  Recent Labs  Lab 04/17/17  0501 04/12/17  0414    138   POTASSIUM 3.3* 3.4   CHLORIDE 100 102   CO2 32 25   ANIONGAP 8 10   * 132*   BUN 17 16   CR 1.76* 1.50*   GFRESTIMATED 45* 54*   GFRESTBLACK 54* 65   KATY 8.6 8.3*       CBC  Recent Labs  Lab 04/17/17  0501 04/12/17  0414   WBC 11.6* 11.3*   RBC 4.54 4.58   HGB 13.2* 13.4   HCT 39.5* 39.3*   MCV 87 86   MCH 29.1 29.3   MCHC 33.4 34.1   RDW 14.4 14.5   * 395       TSHNo lab results found in last 7 days.       Assessment and Plan/Recommendations:     Bryon Alonzo is a 32 year old male with history of morbid   obesity, atypical chest pain, HTN, GERD, mild persistent asthma,   bipolar disorder and cocaine abuse admitted to [facility] for   suicidal ideation and cocaine use.    Depression, Bipolar Disorder, Suicidal Ideation. Management per   Psychiatry.    Polysubstance Abuse. U tox positive for cocaine. Hep B, C, and   HIV non reactive on4/14/17. Denies recent IVDU.    Elevated Trop in setting of cocaine use. EKG 4/17 with non   specific T wave abnormalities, unchanged from previous EKG. Trop   0.054 on 4/17, stable from 4/12 (0.057 and 0.052). Nl stress test   3 years ago. Given patient is asymptomatic without chest pain and   palitations, will not recheck Trop level.     HTN, LVH. Echo 01/2016 with EF of 70%, concentric LVH (likely 2/2   HTN). Pt reports he was taking  Labetolol once daily, but upon   chart review, patient should be on BID dosing. Currently BPs   ~150/85 with elevation to 194/106 last night. Pt was noncompliant   with medications, likely the cause of elevated BP.  - Continue PTA lisinopril 30mg qday   - Continue PTA Labetolol 200mg BID   - Previously on amlodipine, consider restarting if BP   persistently elevated  - Goal BP <130/90 given CKD    CKD Stage II. Likely 2/2 HTN and noncompliance in setting of   substance abuse. Baseline Cr ~1.5 with GFR of 65. Currently Cr   1.76. Mild increase from baseline as a result of alcohol use and   poor oral intake this week. Follows with Nephrology at [facility].   - Avoid nephrotoxic agents   - Check Cr tomorrow  - Follow up with outpatient nephrologist as scheduled    Morbid obesity: Follows with bariatrics and nutrition team and   [facility], considering gastric sleeve. Needs to be sober x1 year prior   to surgery.  -Continue to follow up as OP w/ bariatric surgery and nutrition    Dyslipidemia: Lipid panel w/ mildly elevated T chol at 213 (274),   LDL of 102 (181), HDL 75 (55),  (181), much improved from   lipid panel in 08/2016 w/o medication management.   -FU w/ PCP for consideration of statin or further medications on   discharge  -Encouraged to continue lifestyle modifications and provided   encouragement    GERD: Endorses stable symptoms. Continue Zantac BID.    Asthma, mild persistent: On PRN albuterol PTA. Reported mild   shortness of breath this AM 2/2 anxiety. Not associated with   chest pain or wheezing. Lungs CTAB.   - Continue PRN Albuterol    Hypokalemia. K 3.3 on 4/17. Replaced 4/18 with 40meq. Recheck K   ordered.       Medicine will follow BPs. Please page the Internal Medicine job   code pager for any intercurrent medical issues which arise. Thank   you for the opportunity to be a part of this patient's care.    Malcolm Dodd PA-C  Hospitalist Service  [phone]   Neisseria gonorrhoea PCR   Result Value  Ref Range    Specimen Descrip Urine     N Gonorrhea PCR  NEG     Negative   Negative for N. gonorrhoeae rRNA by transcription mediated amplification.   A negative result by transcription mediated amplification does not preclude the   presence of N. gonorrhoeae infection because results are dependent on proper   and adequate collection, absence of inhibitors, and sufficient rRNA to be   detected.     Chlamydia trachomatis PCR   Result Value Ref Range    Specimen Description Urine     Chlamydia Trachomatis PCR  NEG     Negative   Negative for C. trachomatis rRNA by transcription mediated amplification.   A negative result by transcription mediated amplification does not preclude the   presence of C. trachomatis infection because results are dependent on proper   and adequate collection, absence of inhibitors, and sufficient rRNA to be   detected.              Consults:     Consultation during this admission received from internal medicine, see above for details.         Hospital Course:     Diagnostic Impression (Upon Admission): Patient has a long history of cocaine/crack use disorder. He becomes suicidal while on cocaine and after a brief hospitalization and stabilization, restarts the use. Patient is diagnosed with cocaine use disorder. Previous diagnosis of bipolar II is considered highly questionable.    Hospital course: Bryon Alonzo was admitted to station [facility] as a voluntary patient and PTA meds restarted. He reported that he had not taken medications after previous discharge two days before. He agreed with team's suggestion to petition for commitment and expressed his wish to discharge to a CD treatment facility. Within 2 days he was denying, and did not appear to be objectively experiencing, hallucinations, paranoia and SI. He insisted that he now could be trusted to follow-through with treatment, and expressed irritation with the treatment team for recommending [facility], believing that more desirable  plans were not being pursued, when in fact, other options were quite limited. He was flippant and also portrayed himself as powerless to resist crack use because of the large amount of money he has possession of.     He continued to express desire to discharge to  treatment throughout his hospitalization. Daily treatment meetings consisted of reiterations of team based teaching on his increasingly urgent need for sobriety as well as his elaboration of convictions to attain same.  Petition for commitment supported by  on 4/24. Notified on 5/3 of acceptance to [facility] in [place], [place]. Later that day the team was informed by the [facility]  that the [facility] was seeking to advocate that he be discharged to a [facility] facility instead of [facility] at his 5/4 petition hearing, this being due to his previous elopements. After learning of this, the patient spoke with his  and agreed that they would seek a stay and discharge to [facility] (an open facility) with the treatment team's blessing. On 5/4, the court decided to silvana the requested stay of commitment, and it was determined that the patient would indeed discharge to [facility] at the earliest possible date.    The patient's symptoms of drug intoxication and resultant psychosis/SI improved and on 5/8/2017 Bryon Alonzo was discharged to [facility] treatment facility. At the time of discharge Bryon Alonzo was determined to not be a danger to himself or others.    Today Bryon Alonzo reports a strong conviction towards drug rehabilitation and building a sober life.     This patient has notable risk factors for self-harm, including single status, substance abuse and comorbid medical condition of cardiomyopathy. However, risk is mitigated by ability to volunteer a safety plan and history of seeking help when needed. Additional steps taken to minimize risk include: immediate discharge to  treatment facility. Therefore,  based on all available evidence including the factors cited above, Bryon Alonzo does not appear to be at imminent risk for self-harm, and is appropriate for outpatient level of care.     This document serves as a transfer of care to Bryon Alonzo's outpatient providers.    NOTE:  It is the assessment of the treatment team that this patient DOES NOT have a primary psychiatric disorder.  He suffers from Amphetamine Use Disorder, and only becomes psychiatrically impaired (psychosis/SI) in the setting of acute intoxication. In the event this patient should be admitted to this facility again, he should be sent to detox and NOT admitted to inpatient psychiatry.         Discharge Medications:     Risperidone 1 mg qHS  Melatonin 6 mg qHS PRN  Trazodone 25 mg qHS PRN  Aspirin 81 mg daily  Atorvastatin 10 mg daily  Lisinopril 40 mg daily  Labetalol 200 mg BID  Amlodipine 10 mg daily  Ranitidine 150 mg BID  Multivitamin 1 tablet daily  Cholecalciferol 2000 Units gemma  Albuterol MCG/ACT 2 puffs q4 hrs PRN  Epinephrine 0.3 IM PRN for anaphylaxis         Psychiatric Examination:     Appearance:  awake, alert and well groomed  Attitude:  cooperative and excited  Eye Contact:  good  Mood:  better and good  Affect:  appropriate and in normal range, mood congruent, full range and reactive  Speech:  clear, coherent and normal prosody  Psychomotor Behavior:  no evidence of tardive dyskinesia, dystonia, or tics and intact station, gait and muscle tone  Thought Process:  logical, linear and goal oriented  Associations:  no loose associations  Thought Content:  no evidence of suicidal ideation or homicidal ideation and no evidence of psychotic thought  Insight:  good  Judgment:  intact  Oriented to:  time, person, and place  Attention Span and Concentration:  intact  Recent and Remote Memory:  intact  Language: fluent English with appropriate vocabulary and syntax  Fund of Knowledge: appropriate  Muscle Strength and Tone:  normal  Gait and Station: Normal         Discharge Plan:     Psychiatry Follow-up Appointments (Provided to Patient in After Visit Summary):      Dr. Oshea- Monday June 12th 9:30  [facility]- [address], [address] [place] [address].   Phone: [phone] Fax: [phone]    -----------------------------------------------------    Patient has been seen and evaluated by me, George Torres DO, Psychiatry Resident, PGY1.    George Torres DO  Resident Psychiatrist, PGY-1  [facility]    Psychiatry Attending Attestation:  This patient has been seen and evaluated by me, Fernando Crawford M.D. The hospital care and discharge plan were formulated in team discussion with the patient, psychiatry resident and/or medical student, the erazo Clinical Treatment Coordinator, and RN. I reviewed, edited and agree with the findings and plan in this discharge summary. Total time on discharge date involved with planning and face-to-face discussion with the patient was 25 minutes.    It was our pleasure and privilege to participate in the care of this patient. Please contact any of the team for any questions about the above information.     Garcia Crawford M.D.   of Psychiatry  [facility]  Email [email or web address]  Phone [phone]

## 2017-07-21 ENCOUNTER — APPOINTMENT (OUTPATIENT)
Dept: GENERAL RADIOLOGY | Facility: CLINIC | Age: 32
DRG: 885 | End: 2017-07-21
Attending: EMERGENCY MEDICINE
Payer: COMMERCIAL

## 2017-07-21 ENCOUNTER — HOSPITAL ENCOUNTER (INPATIENT)
Facility: CLINIC | Age: 32
LOS: 2 days | Discharge: HOME | DRG: 885 | End: 2017-07-23
Attending: EMERGENCY MEDICINE | Admitting: PSYCHIATRY & NEUROLOGY
Payer: COMMERCIAL

## 2017-07-21 DIAGNOSIS — F14.90 COCAINE USE: ICD-10-CM

## 2017-07-21 DIAGNOSIS — F33.2 SEVERE EPISODE OF RECURRENT MAJOR DEPRESSIVE DISORDER, WITHOUT PSYCHOTIC FEATURES (H): ICD-10-CM

## 2017-07-21 DIAGNOSIS — F33.2 SEVERE RECURRENT MAJOR DEPRESSION WITHOUT PSYCHOTIC FEATURES (H): ICD-10-CM

## 2017-07-21 DIAGNOSIS — R07.9 ACUTE CHEST PAIN: ICD-10-CM

## 2017-07-21 LAB
AMPHETAMINES UR QL SCN: ABNORMAL
ANION GAP SERPL CALCULATED.3IONS-SCNC: 8 MMOL/L (ref 3–14)
BARBITURATES UR QL: ABNORMAL
BASOPHILS # BLD AUTO: 0 10E9/L (ref 0–0.2)
BASOPHILS NFR BLD AUTO: 0.5 %
BENZODIAZ UR QL: ABNORMAL
BUN SERPL-MCNC: 14 MG/DL (ref 7–30)
CALCIUM SERPL-MCNC: 8.8 MG/DL (ref 8.5–10.1)
CANNABINOIDS UR QL SCN: ABNORMAL
CHLORIDE SERPL-SCNC: 105 MMOL/L (ref 94–109)
CO2 SERPL-SCNC: 26 MMOL/L (ref 20–32)
COCAINE UR QL: ABNORMAL
CREAT SERPL-MCNC: 1.5 MG/DL (ref 0.66–1.25)
DIFFERENTIAL METHOD BLD: ABNORMAL
EOSINOPHIL # BLD AUTO: 0.1 10E9/L (ref 0–0.7)
EOSINOPHIL NFR BLD AUTO: 1.7 %
ERYTHROCYTE [DISTWIDTH] IN BLOOD BY AUTOMATED COUNT: 15.3 % (ref 10–15)
ETHANOL UR QL SCN: ABNORMAL
GFR SERPL CREATININE-BSD FRML MDRD: 53 ML/MIN/1.7M2
GLUCOSE SERPL-MCNC: 96 MG/DL (ref 70–99)
HCT VFR BLD AUTO: 38 % (ref 40–53)
HGB BLD-MCNC: 12.5 G/DL (ref 13.3–17.7)
IMM GRANULOCYTES # BLD: 0 10E9/L (ref 0–0.4)
IMM GRANULOCYTES NFR BLD: 0.2 %
INTERPRETATION ECG - MUSE: NORMAL
LYMPHOCYTES # BLD AUTO: 1.9 10E9/L (ref 0.8–5.3)
LYMPHOCYTES NFR BLD AUTO: 31.7 %
MCH RBC QN AUTO: 29.6 PG (ref 26.5–33)
MCHC RBC AUTO-ENTMCNC: 32.9 G/DL (ref 31.5–36.5)
MCV RBC AUTO: 90 FL (ref 78–100)
MONOCYTES # BLD AUTO: 0.4 10E9/L (ref 0–1.3)
MONOCYTES NFR BLD AUTO: 6.2 %
NEUTROPHILS # BLD AUTO: 3.6 10E9/L (ref 1.6–8.3)
NEUTROPHILS NFR BLD AUTO: 59.7 %
NRBC # BLD AUTO: 0 10*3/UL
NRBC BLD AUTO-RTO: 0 /100
OPIATES UR QL SCN: ABNORMAL
PLATELET # BLD AUTO: 333 10E9/L (ref 150–450)
POTASSIUM SERPL-SCNC: 3 MMOL/L (ref 3.4–5.3)
POTASSIUM SERPL-SCNC: 3.1 MMOL/L (ref 3.4–5.3)
RBC # BLD AUTO: 4.23 10E12/L (ref 4.4–5.9)
SODIUM SERPL-SCNC: 138 MMOL/L (ref 133–144)
TROPONIN I SERPL-MCNC: 0.04 UG/L (ref 0–0.04)
TROPONIN I SERPL-MCNC: 0.05 UG/L (ref 0–0.04)
WBC # BLD AUTO: 6 10E9/L (ref 4–11)

## 2017-07-21 PROCEDURE — 96374 THER/PROPH/DIAG INJ IV PUSH: CPT | Performed by: EMERGENCY MEDICINE

## 2017-07-21 PROCEDURE — 80320 DRUG SCREEN QUANTALCOHOLS: CPT | Performed by: EMERGENCY MEDICINE

## 2017-07-21 PROCEDURE — 99207 ZZC APP CREDIT; MD BILLING SHARED VISIT: CPT | Mod: Z6 | Performed by: EMERGENCY MEDICINE

## 2017-07-21 PROCEDURE — 25000128 H RX IP 250 OP 636: Performed by: EMERGENCY MEDICINE

## 2017-07-21 PROCEDURE — 99285 EMERGENCY DEPT VISIT HI MDM: CPT | Mod: 25 | Performed by: EMERGENCY MEDICINE

## 2017-07-21 PROCEDURE — 80307 DRUG TEST PRSMV CHEM ANLYZR: CPT | Performed by: EMERGENCY MEDICINE

## 2017-07-21 PROCEDURE — 85025 COMPLETE CBC W/AUTO DIFF WBC: CPT | Performed by: EMERGENCY MEDICINE

## 2017-07-21 PROCEDURE — 80048 BASIC METABOLIC PNL TOTAL CA: CPT | Performed by: EMERGENCY MEDICINE

## 2017-07-21 PROCEDURE — 12400001 ZZH R&B MH UMMC

## 2017-07-21 PROCEDURE — 25000132 ZZH RX MED GY IP 250 OP 250 PS 637: Performed by: EMERGENCY MEDICINE

## 2017-07-21 PROCEDURE — 25000132 ZZH RX MED GY IP 250 OP 250 PS 637: Performed by: PSYCHIATRY & NEUROLOGY

## 2017-07-21 PROCEDURE — 93005 ELECTROCARDIOGRAM TRACING: CPT | Performed by: EMERGENCY MEDICINE

## 2017-07-21 PROCEDURE — 36415 COLL VENOUS BLD VENIPUNCTURE: CPT | Performed by: INTERNAL MEDICINE

## 2017-07-21 PROCEDURE — 71010 XR CHEST PORT 1 VW: CPT

## 2017-07-21 PROCEDURE — 84132 ASSAY OF SERUM POTASSIUM: CPT | Performed by: INTERNAL MEDICINE

## 2017-07-21 PROCEDURE — 84484 ASSAY OF TROPONIN QUANT: CPT | Performed by: EMERGENCY MEDICINE

## 2017-07-21 PROCEDURE — 93010 ELECTROCARDIOGRAM REPORT: CPT | Mod: Z6 | Performed by: EMERGENCY MEDICINE

## 2017-07-21 RX ORDER — LABETALOL 200 MG/1
200 TABLET, FILM COATED ORAL EVERY 12 HOURS SCHEDULED
Status: DISCONTINUED | OUTPATIENT
Start: 2017-07-21 | End: 2017-07-23 | Stop reason: HOSPADM

## 2017-07-21 RX ORDER — ASPIRIN 81 MG/1
324 TABLET, CHEWABLE ORAL ONCE
Status: COMPLETED | OUTPATIENT
Start: 2017-07-21 | End: 2017-07-21

## 2017-07-21 RX ORDER — HYDROXYZINE HYDROCHLORIDE 50 MG/1
50 TABLET, FILM COATED ORAL EVERY 4 HOURS PRN
Status: DISCONTINUED | OUTPATIENT
Start: 2017-07-21 | End: 2017-07-23 | Stop reason: HOSPADM

## 2017-07-21 RX ORDER — LISINOPRIL 40 MG/1
40 TABLET ORAL DAILY
Status: DISCONTINUED | OUTPATIENT
Start: 2017-07-21 | End: 2017-07-23 | Stop reason: HOSPADM

## 2017-07-21 RX ORDER — ATORVASTATIN CALCIUM 10 MG/1
10 TABLET, FILM COATED ORAL DAILY
Status: DISCONTINUED | OUTPATIENT
Start: 2017-07-21 | End: 2017-07-21

## 2017-07-21 RX ORDER — LORAZEPAM 2 MG/ML
1 INJECTION INTRAMUSCULAR ONCE
Status: COMPLETED | OUTPATIENT
Start: 2017-07-21 | End: 2017-07-21

## 2017-07-21 RX ORDER — RISPERIDONE 1 MG/1
1 TABLET ORAL DAILY
Status: DISCONTINUED | OUTPATIENT
Start: 2017-07-21 | End: 2017-07-23 | Stop reason: HOSPADM

## 2017-07-21 RX ORDER — BISACODYL 10 MG
10 SUPPOSITORY, RECTAL RECTAL DAILY PRN
Status: DISCONTINUED | OUTPATIENT
Start: 2017-07-21 | End: 2017-07-23 | Stop reason: HOSPADM

## 2017-07-21 RX ORDER — ATORVASTATIN CALCIUM 10 MG/1
10 TABLET, FILM COATED ORAL DAILY
Status: DISCONTINUED | OUTPATIENT
Start: 2017-07-21 | End: 2017-07-23 | Stop reason: HOSPADM

## 2017-07-21 RX ORDER — ACETAMINOPHEN 325 MG/1
650 TABLET ORAL EVERY 4 HOURS PRN
Status: DISCONTINUED | OUTPATIENT
Start: 2017-07-21 | End: 2017-07-23 | Stop reason: HOSPADM

## 2017-07-21 RX ORDER — ASPIRIN 81 MG/1
81 TABLET, CHEWABLE ORAL ONCE
Status: DISCONTINUED | OUTPATIENT
Start: 2017-07-21 | End: 2017-07-21 | Stop reason: CLARIF

## 2017-07-21 RX ORDER — AMLODIPINE BESYLATE 10 MG/1
10 TABLET ORAL DAILY
Status: DISCONTINUED | OUTPATIENT
Start: 2017-07-21 | End: 2017-07-23 | Stop reason: HOSPADM

## 2017-07-21 RX ORDER — OLANZAPINE 5 MG/1
5 TABLET ORAL
Status: DISCONTINUED | OUTPATIENT
Start: 2017-07-21 | End: 2017-07-23 | Stop reason: HOSPADM

## 2017-07-21 RX ORDER — ALUMINA, MAGNESIA, AND SIMETHICONE 2400; 2400; 240 MG/30ML; MG/30ML; MG/30ML
30 SUSPENSION ORAL EVERY 4 HOURS PRN
Status: DISCONTINUED | OUTPATIENT
Start: 2017-07-21 | End: 2017-07-23 | Stop reason: HOSPADM

## 2017-07-21 RX ORDER — FENTANYL CITRATE 50 UG/ML
50 INJECTION, SOLUTION INTRAMUSCULAR; INTRAVENOUS ONCE
Status: DISCONTINUED | OUTPATIENT
Start: 2017-07-21 | End: 2017-07-21

## 2017-07-21 RX ORDER — TRAZODONE HYDROCHLORIDE 50 MG/1
50 TABLET, FILM COATED ORAL
Status: DISCONTINUED | OUTPATIENT
Start: 2017-07-21 | End: 2017-07-23 | Stop reason: HOSPADM

## 2017-07-21 RX ORDER — OLANZAPINE 10 MG/2ML
5 INJECTION, POWDER, FOR SOLUTION INTRAMUSCULAR
Status: DISCONTINUED | OUTPATIENT
Start: 2017-07-21 | End: 2017-07-23 | Stop reason: HOSPADM

## 2017-07-21 RX ADMIN — TRAZODONE HYDROCHLORIDE 50 MG: 50 TABLET ORAL at 21:44

## 2017-07-21 RX ADMIN — RISPERIDONE 1 MG: 1 TABLET ORAL at 12:24

## 2017-07-21 RX ADMIN — AMLODIPINE BESYLATE 10 MG: 10 TABLET ORAL at 12:27

## 2017-07-21 RX ADMIN — RANITIDINE HYDROCHLORIDE 150 MG: 150 TABLET, FILM COATED ORAL at 12:25

## 2017-07-21 RX ADMIN — ATORVASTATIN CALCIUM 10 MG: 10 TABLET, FILM COATED ORAL at 12:26

## 2017-07-21 RX ADMIN — ASPIRIN 81 MG 324 MG: 81 TABLET ORAL at 05:54

## 2017-07-21 RX ADMIN — LABETALOL HCL 200 MG: 200 TABLET, FILM COATED ORAL at 20:35

## 2017-07-21 RX ADMIN — LISINOPRIL 40 MG: 40 TABLET ORAL at 12:24

## 2017-07-21 RX ADMIN — RANITIDINE HYDROCHLORIDE 150 MG: 150 TABLET, FILM COATED ORAL at 20:38

## 2017-07-21 RX ADMIN — LABETALOL HCL 200 MG: 200 TABLET, FILM COATED ORAL at 13:38

## 2017-07-21 RX ADMIN — LORAZEPAM 1 MG: 2 INJECTION INTRAMUSCULAR; INTRAVENOUS at 05:55

## 2017-07-21 ASSESSMENT — ENCOUNTER SYMPTOMS
DYSPHORIC MOOD: 1
ABDOMINAL PAIN: 0
SHORTNESS OF BREATH: 1
FEVER: 0
SLEEP DISTURBANCE: 1
NERVOUS/ANXIOUS: 1

## 2017-07-21 ASSESSMENT — ACTIVITIES OF DAILY LIVING (ADL)
AMBULATION: 0-->INDEPENDENT
DRESS: 0-->INDEPENDENT
RETIRED_EATING: 0-->INDEPENDENT
SWALLOWING: 0-->SWALLOWS FOODS/LIQUIDS WITHOUT DIFFICULTY
TOILETING: 0-->INDEPENDENT
COGNITION: 0 - NO COGNITION ISSUES REPORTED
TRANSFERRING: 0-->INDEPENDENT
FALL_HISTORY_WITHIN_LAST_SIX_MONTHS: NO
BATHING: 0-->INDEPENDENT
RETIRED_COMMUNICATION: 0-->UNDERSTANDS/COMMUNICATES WITHOUT DIFFICULTY

## 2017-07-21 NOTE — ED PROVIDER NOTES
Sign out from Dr. Mccabe at 9:30 AM.     Situation: 31 yo M presenting with chest pain, attempt at suicide by OD on cocaine.     Plan:   Troponin #2 at 10AM and plan for admission to mental health if troponin reassuring.     Events:   11:28 AM   Trop #2 stable at his baseline 0.5. Repeat BP elevated. Pt has not had his antihypertensive meds today. Order his usual meds and his risperdal.    Pt and I discussed his MICD issues.   Lost his housing 1 month ago. Living out of his car. Relapsed on drugs a that time. Hopeless. Not eating or sleeping. Not taking any medications.     11:29 AM  CP continues to be gone. He is tired.  Discussed with mental health intake.   Sending urine drug screen.   Will be voluntary admit to MICD.   Will repeat troponin again at 2pm.   Monitoring BP and expecting it to improve with oral meds.     MD Michael Larson Katrina Anne, MD  07/21/17 2038  Discussed with mental health intake. Last discharge summary from inpatient psych indicating pt does not have primary psychiatric disorder, but substance-induced mood disorder. Recommending admission to detox, not mental health. No current detox beds available.     Clarification of pt's CD issues. Last 3 weeks: Alcohol 1 gallon vodka daily, last ~6 hours prior to ED arrival. Meth 2-3 times in the last month, last several days-week ago. Cocaine daily and excessively.   UDS ordered, no sample available yet.     Contacted [facility] due to pt having restriction. No beds available there.     Intake will discuss with admitting doctors about Bryon's case. He indicates he is on commitment through [place] currently.      Galilea Rodriguez MD  07/21/17 1311  Vitals much improved after PO medications. Medically clear for admission to the mental health unit.   Contacted by intake. Pt accepted for admission on [address] under Dr. Robertson.        Galilea Rodriguez MD  07/21/17 1940

## 2017-07-21 NOTE — IP AVS SNAPSHOT
[address] [address]    Phone:  [phone]                                       After Visit Summary   7/21/2017    Bryon Alonzo    MRN: [id number]           After Visit Summary Signature Page     I have received my discharge instructions, and my questions have been answered. I have discussed any challenges I see with this plan with the nurse or doctor.    ..........................................................................................................................................  Patient/Patient Representative Signature      ..........................................................................................................................................  Patient Representative Print Name and Relationship to Patient    ..................................................               ................................................  Date                                            Time    ..........................................................................................................................................  Reviewed by Signature/Title    ...................................................              ..............................................  Date                                                            Time

## 2017-07-21 NOTE — ED NOTES
"Pt arrived via car to the ED with reports of depression, helplessness and suicidal ideation. Pt reports he has relapsed using cocaine and alcohol. He reports he last had alcohol about 4 hours ago and used cocaine an hour ago. Pt stated \"I put the pedal to the metal to take as much cocaine as I could.\"   "

## 2017-07-21 NOTE — PLAN OF CARE
"Problem: Depressive Symptoms  Goal: Depressive Symptoms  Signs and symptoms of listed problems will be absent or manageable.  Outcome: No Change  Admission Assessment     /82  Pulse 73  Temp 96.6  F (35.9  C) (Oral)  Resp 16  Ht 1.676 m (5' 6\")  Wt 102.1 kg (225 lb)  SpO2 99%  BMI 36.32 kg/m2    Pt denies attempting to elope from previous facilities      Pt was admitted to station 20 at 1700. Belongings were searched, and belongings list was entered (please see note). Pt has history of previous admissions to [facility], and current visit is due to suicidal ideation with plan to overdose or shoot himself. Pt states that he does have guns available to him.        Pt was calm and cooperative. When asked if he would be willing to sit down with writer for admission assessment he stated \"Oh, man. Do you guys still have a thousand questions you ask? Can't you just copy from what I said the last time I was here?\" Writer stated that this would not be possible. Pt went directly to his room to sleep. When writer attempted to wake him for check-in, he stated \"I'll do that later. I haven't slept in a week.\" Writer stated that's fine, but we would need to complete the admission today. Pt states that he is not currently suicidal. He did do cocaine and meth, as well as drank \"A gallon of alcohol per day\". However, UTOX was only positive for cocaine. Pt denies history of seizures or previous issues associated with withdrawal.      Pt potassium level is slightly decreased at 3.0. Internal medicine paged, and STAT blood draw ordered by physician.     Pt ate dinner, and has been drinking water. At 2041 pt states that he is \"decent\" and in no physical pain. Pt has been sleeping for the majority of the evening.    Pt requested STI testing. These tests were approved by on-call physician and orders have been placed.    Pt requested HS medications at 2150, and received 50 mg trazodone. At this time Consent for Treatment form " signed by pt. Pt went back to bed following this.    Will continue to assess, and attempt to complete admission.

## 2017-07-21 NOTE — IP AVS SNAPSHOT
MRN:[id number]                      After Visit Summary   7/21/2017    Bryon Alonzo    MRN: [id number]           Thank you!     Thank you for choosing [facility] for your care. Our goal is always to provide you with excellent care.        Patient Information     Date Of Birth          1985        Designated Caregiver       Most Recent Value    Caregiver    Will someone help with your care after discharge? no      About your hospital stay     You were admitted on:  July 21, 2017 You last received care in the:  [facility]    You were discharged on:  July 23, 2017       Who to Call     For medical emergencies, please call 911.  For non-urgent questions about your medical care, please call your primary care provider or clinic, None          Attending Provider     Provider Specialty    Tj Mccabe MD Emergency Medicine    Galilea Rodriguez MD Emergency Medicine    Daniel Lowry MD Psychiatry       Primary Care Provider Fax #    [facility] [phone]      Further instructions from your care team       Behavioral Discharge Planning and Instructions      Summary:  You were admitted on 7/21/2017  For Chemical Use Issues.  You were treated by Dr. Daniel Lowry MD and discharged on ***/***/*** from Station *** to {[facility] D/C Locations:061710}      Main Diagnosis:       Health Care Follow-up Appointments:   Dr. Oshea  [place] [address] [facility], [address] [place] [address].   Phone: [phone] Fax: [phone]      If no appointments scheduled, explain ***.  Attend all scheduled appointments with your outpatient providers. Call at least 24 hours in advance if you need to reschedule an appointment to ensure continued access to your outpatient providers.   Major Treatments, Procedures and Findings:  You were provided with: a psychiatric assessment, assessed for medical stability and milieu management    Symptoms to Report:  "feeling more aggressive, increased confusion, losing more sleep, mood getting worse or thoughts of suicide    Early warning signs can include: increased depression or anxiety sleep disturbances increased thoughts or behaviors of suicide or self-harm  increased unusual thinking, such as paranoia or hearing voices    Safety and Wellness:  {Age Group Safety Wellness:975183}    Resources:   Crisis Intervention: 198.596.5484 or 108-445-9066 (TTY: 984.985.9273).  Call anytime for help.  Alcoholics Anonymous (www.alcoholics-anonymous.org): Check your phone book for your local chapter.  Mental Health Consumer/Survivor Network of [place] (www.mhcsn.net): 922.553.6156 or 561-544-6765  Mental [place] Association [place] (www.mentalhealth.org): 305.762.2616 or 717-287-2661    {AVS END STATEMENT:956529}        Pending Results     Date and Time Order Name Status Description    7/23/2017 0030 Vitamin D In process     7/22/2017 0030 Hepatitis C antibody In process     7/22/2017 0030 HIV Antigen Antibody Combo In process             Statement of Approval     Ordered          07/23/17 1834  I have reviewed and agree with all the recommendations and orders detailed in this document.  EFFECTIVE NOW     Comments:  Discharge today.  Please send labs and dc med list to his mds   Approved and electronically signed by:  Pantera Nolan MD             Admission Information     Date & Time Provider Department Dept. Phone    7/21/2017 Daniel Lowry MD  [facility] [phone]      Your Vitals Were     Blood Pressure Pulse Temperature Respirations Height Weight    146/71 98 97.8  F (36.6  C) (Oral) 16 1.676 m (5' 6\") 104.3 kg (230 lb)    Pulse Oximetry BMI (Body Mass Index)                99% 37.12 kg/m2          MyChart Information     [facility] lets you send messages to your doctor, view your test results, renew your prescriptions, schedule appointments and more. To sign up, go to [email or web address] . Click on \"Log in\" on the left side of the " "screen, which will take you to the Welcome page. Then click on \"Sign up Now\" on the right side of the page.     You will be asked to enter the access code listed below, as well as some personal information. Please follow the directions to create your username and password.     Your access code is: [email or web address]  Expires: 10/21/2017  6:38 PM     Your access code will  in 90 days. If you need help or a new code, please call your [facility] clinic or [phone].        Care EveryWhere ID     This is your Care EveryWhere ID. This could be used by other organizations to access your [facility] medical records  [id number]        Equal Access to Services     JT SARAH : Catrina Mendes, mahesh fagan, elaine da silva, lilibeth craig. So [facility] [phone].    ATENCIÓN: Si habla español, tiene a garcia disposición servicios gratuitos de asistencia lingüística. Llame al [phone].    We comply with applicable federal civil rights laws and [place] laws. We do not discriminate on the basis of race, color, national origin, age, disability sex, sexual orientation or gender identity.               Review of your medicines      CONTINUE these medicines which have NOT CHANGED        Dose / Directions    albuterol 108 (90 BASE) MCG/ACT Inhaler   Commonly known as:  albuterol   Used for:  Mild persistent asthma without complication        Dose:  2 puff   Inhale 2 puffs into the lungs every 4 hours as needed for shortness of breath / dyspnea   Quantity:  3.7 Inhaler   Refills:  0       amLODIPine 10 MG tablet   Commonly known as:  NORVASC   Used for:  Essential hypertension, malignant        Dose:  10 mg   Take 1 tablet (10 mg) by mouth daily Hold for SBP<110.   Quantity:  30 tablet   Refills:  0       aspirin 81 MG chewable tablet   Used for:  ACS (acute coronary syndrome) (H), Essential hypertension, malignant        Dose:  81 mg   Take 1 tablet (81 mg) by mouth daily "   Quantity:  30 tablet   Refills:  0       atorvastatin 10 MG tablet   Commonly known as:  LIPITOR   Used for:  Chest pain, unspecified, Essential hypertension, malignant, ACS (acute coronary syndrome) (H)        Dose:  10 mg   Take 1 tablet (10 mg) by mouth daily   Quantity:  30 tablet   Refills:  0       cholecalciferol 2000 UNITS tablet   Used for:  Chemical dependency (H)        Dose:  2000 Units   Take 2,000 Units by mouth daily   Quantity:  30 tablet   Refills:  0       EPINEPHrine 0.3 MG/0.3ML injection 2-pack   Commonly known as:  EPIPEN/ADRENACLICK/or ANY BX GENERIC EQUIV   Used for:  Anaphylactic reaction, subsequent encounter        Dose:  0.3 mg   Inject 0.3 mLs (0.3 mg) into the muscle once as needed for anaphylaxis   Quantity:  0.6 mL   Refills:  0       labetalol 200 MG tablet   Commonly known as:  NORMODYNE   Used for:  Essential hypertension, malignant        Dose:  200 mg   Take 1 tablet (200 mg) by mouth 2 times daily Hold for SBP<110 or HR<55.   Quantity:  60 tablet   Refills:  0       lisinopril 40 MG tablet   Commonly known as:  PRINIVIL/ZESTRIL   Used for:  Essential hypertension, malignant        Dose:  40 mg   Take 1 tablet (40 mg) by mouth daily   Quantity:  30 tablet   Refills:  0       melatonin 3 MG tablet   Used for:  Insomnia, unspecified type        Dose:  6 mg   Take 2 tablets (6 mg) by mouth nightly as needed for sleep   Quantity:  60 tablet   Refills:  0       multivitamin, therapeutic Tabs tablet   Used for:  Substance use disorder        Dose:  1 tablet   Take 1 tablet by mouth daily   Quantity:  30 tablet   Refills:  0       ranitidine 150 MG tablet   Commonly known as:  ZANTAC   Used for:  Gastroesophageal reflux disease without esophagitis        Dose:  150 mg   Take 1 tablet (150 mg) by mouth 2 times daily   Quantity:  60 tablet   Refills:  0       risperiDONE 1 MG tablet   Commonly known as:  risperDAL   Used for:  Bipolar disorder, in partial remission, most recent episode  mixed (H)        Dose:  1 mg   Take 1 tablet (1 mg) by mouth At Bedtime   Quantity:  30 tablet   Refills:  0       traZODone 50 MG tablet   Commonly known as:  DESYREL   Used for:  Insomnia, unspecified type        Dose:  25 mg   Take 0.5 tablets (25 mg) by mouth nightly as needed for sleep   Quantity:  30 tablet   Refills:  0                Protect others around you: Learn how to safely use, store and throw away your medicines at www.disposemymeds.org.             Medication List: This is a list of all your medications and when to take them. Check marks below indicate your daily home schedule. Keep this list as a reference.      Medications           Morning Afternoon Evening Bedtime As Needed    albuterol 108 (90 BASE) MCG/ACT Inhaler   Commonly known as:  albuterol   Inhale 2 puffs into the lungs every 4 hours as needed for shortness of breath / dyspnea                                amLODIPine 10 MG tablet   Commonly known as:  NORVASC   Take 1 tablet (10 mg) by mouth daily Hold for SBP<110.   Last time this was given:  10 mg on 7/23/2017 12:07 PM                                aspirin 81 MG chewable tablet   Take 1 tablet (81 mg) by mouth daily   Last time this was given:  324 mg on 7/21/2017  5:54 AM                                atorvastatin 10 MG tablet   Commonly known as:  LIPITOR   Take 1 tablet (10 mg) by mouth daily   Last time this was given:  10 mg on 7/23/2017 12:07 PM                                cholecalciferol 2000 UNITS tablet   Take 2,000 Units by mouth daily                                EPINEPHrine 0.3 MG/0.3ML injection 2-pack   Commonly known as:  EPIPEN/ADRENACLICK/or ANY BX GENERIC EQUIV   Inject 0.3 mLs (0.3 mg) into the muscle once as needed for anaphylaxis                                labetalol 200 MG tablet   Commonly known as:  NORMODYNE   Take 1 tablet (200 mg) by mouth 2 times daily Hold for SBP<110 or HR<55.   Last time this was given:  200 mg on 7/23/2017 12:07 PM                                 lisinopril 40 MG tablet   Commonly known as:  PRINIVIL/ZESTRIL   Take 1 tablet (40 mg) by mouth daily   Last time this was given:  40 mg on 7/23/2017 12:07 PM                                melatonin 3 MG tablet   Take 2 tablets (6 mg) by mouth nightly as needed for sleep                                multivitamin, therapeutic Tabs tablet   Take 1 tablet by mouth daily                                ranitidine 150 MG tablet   Commonly known as:  ZANTAC   Take 1 tablet (150 mg) by mouth 2 times daily   Last time this was given:  150 mg on 7/23/2017 12:06 PM                                risperiDONE 1 MG tablet   Commonly known as:  risperDAL   Take 1 tablet (1 mg) by mouth At Bedtime   Last time this was given:  1 mg on 7/22/2017  9:19 PM                                traZODone 50 MG tablet   Commonly known as:  DESYREL   Take 0.5 tablets (25 mg) by mouth nightly as needed for sleep   Last time this was given:  50 mg on 7/22/2017  9:23 PM

## 2017-07-21 NOTE — ED PROVIDER NOTES
"  History     Chief Complaint   Patient presents with     Suicidal     Depression     Chest Pain     HPI  Bryon Alonzo is a 32 year old male with history of bipolar and cocaine abuse who presents with chest pain.  Patient states he's been using cocaine for the last week or so.  States he's been \"using as much as he can get his hands on.  Typically smokes it.  Last use was about an hour prior to arrival.  Decided that he's gotten more depressed and is thinking about killing himself.  He states he wants to do cocaine until his heart stops.  Has had issues like this in the past.  Had a recent mental health admission but a month ago.  Did not ever follow through with treatment.  Did have intermittent chest pain last 3 days.  Had some shortness of breath with this.  States his chest infection getting better since he arrived here.  Denies any current hallucinations.  States he hasn't slept in 3 days.    I have reviewed the Medications, Allergies, Past Medical and Surgical History, and Social History in the [facility] system.  Past Medical History:   Diagnosis Date     Asthma      Bipolar disorder (H)      Chronic kidney disease      Hyperlipidemia      Hypertension      LVH (left ventricular hypertrophy) 2015     Polysubstance abuse     Methamphetamine, Cocaine, Alcohol, THC, LSD       Past Surgical History:   Procedure Laterality Date     NO HISTORY OF SURGERY         Family History   Problem Relation Age of Onset     DIABETES Mother      Hypertension Mother      Coronary Artery Disease Father      KIDNEY DISEASE Father        Social History   Substance Use Topics     Smoking status: Heavy Tobacco Smoker     Packs/day: 3.00     Smokeless tobacco: Not on file     Alcohol use 0.0 oz/week     0 Standard drinks or equivalent per week      Comment: One pint per day of vodka per day for past five days       Review of Systems   Constitutional: Negative for fever.   Respiratory: Positive for shortness of breath.    Cardiovascular: " Positive for chest pain.   Gastrointestinal: Negative for abdominal pain.   Psychiatric/Behavioral: Positive for dysphoric mood, sleep disturbance and suicidal ideas. The patient is nervous/anxious.    All other systems reviewed and are negative.      Physical Exam   BP: (!) 201/137  Heart Rate: 72  Temp: 98.4  F (36.9  C)  Resp: 16  Weight: 103.4 kg (228 lb)  SpO2: 97 %  Physical Exam   Vital Signs and Nursing Notes Reviewed.  General:  NAD  HEENT: Oropharynx clear.  No obvious signs of trauma.  PERRL.  EOMI  Neck: Supple.  No lymphadenopathy.  Cardiac: RRR.  No murmurs, rubs or gallops  Lungs: Clear bilaterally.  Normal respiratory rate.    Abdomen:  Soft, Nontender, no rebound or guarding.  Back: No CVA tenderness.  Skin:  No rash.  No diaphoresis  Extremities:  No cyanosis, clubbing, or edema.  Neurological:  CN II-XII intact, Strength intact, Sensation intact, No pronator drift, Normal gait.  Pulse:  Symmetric in bilateral upper and lower extremities.     ED Course     ED Course     Procedures             EKG Interpretation:      Interpreted by Tj Mccabe  Time reviewed: 516  Symptoms at time of EKG: chest pain   Rhythm: normal sinus   Rate: Normal  Axis: Normal  Ectopy: none  Conduction: normal  ST Segments/ T Waves: Non-specific ST-T wave changes  Q Waves: none  Comparison to prior: Unchanged from 4/17/17    Clinical Impression: NSR with no acute changes.                   Critical Care time:  none   I independently visualized the xray:  Chest Xray:  No acute disease.  No infiltate, pneumothorax or effusion.           Labs Ordered and Resulted from Time of ED Arrival Up to the Time of Departure from the ED   CBC WITH PLATELETS DIFFERENTIAL - Abnormal; Notable for the following:        Result Value    RBC Count 4.23 (*)     Hemoglobin 12.5 (*)     Hematocrit 38.0 (*)     RDW 15.3 (*)     All other components within normal limits   BASIC METABOLIC PANEL - Abnormal; Notable for the following:      Potassium 3.0 (*)     Creatinine 1.50 (*)     GFR Estimate 53 (*)     All other components within normal limits   TROPONIN I            Assessments & Plan (with Medical Decision Making)   32 year old who presents with chest pain depression and suicidal ideation.  Phimosis relates to his cocaine abuse.  Initial EKG does not show any acute changes.  No signs of acute ischemia.  Initial troponins negative.  Creatinine site elevated at 1.50.  Will be given IV fluids.  Potassium is slightly low at 3.  Chest x-ray is clear.  Will repeat a 4 hour troponin.  If this is negative he will be medically cleared.  At this point likely will need admission to mental health.  Patient signed out to Dr. Rodriguez with the above plan.    I have reviewed the nursing notes.    I have reviewed the findings, diagnosis, plan and need for follow up with the patient.    New Prescriptions    No medications on file       Final diagnoses:   Cocaine use   Acute chest pain   Severe episode of recurrent major depressive disorder, without psychotic features (H)       7/21/2017   [facility], [place], EMERGENCY DEPARTMENT     Tj Mccabe MD  07/21/17 0915

## 2017-07-22 LAB
ALBUMIN SERPL-MCNC: 2.8 G/DL (ref 3.4–5)
ALP SERPL-CCNC: 52 U/L (ref 40–150)
ALT SERPL W P-5'-P-CCNC: 24 U/L (ref 0–70)
ANION GAP SERPL CALCULATED.3IONS-SCNC: 10 MMOL/L (ref 3–14)
AST SERPL W P-5'-P-CCNC: 13 U/L (ref 0–45)
BASOPHILS # BLD AUTO: 0 10E9/L (ref 0–0.2)
BASOPHILS NFR BLD AUTO: 0.9 %
BILIRUB SERPL-MCNC: 0.3 MG/DL (ref 0.2–1.3)
BUN SERPL-MCNC: 13 MG/DL (ref 7–30)
CALCIUM SERPL-MCNC: 8.3 MG/DL (ref 8.5–10.1)
CHLORIDE SERPL-SCNC: 108 MMOL/L (ref 94–109)
CO2 SERPL-SCNC: 25 MMOL/L (ref 20–32)
CREAT SERPL-MCNC: 1.6 MG/DL (ref 0.66–1.25)
DIFFERENTIAL METHOD BLD: ABNORMAL
EOSINOPHIL # BLD AUTO: 0.2 10E9/L (ref 0–0.7)
EOSINOPHIL NFR BLD AUTO: 3.3 %
ERYTHROCYTE [DISTWIDTH] IN BLOOD BY AUTOMATED COUNT: 15.1 % (ref 10–15)
GFR SERPL CREATININE-BSD FRML MDRD: 50 ML/MIN/1.7M2
GLUCOSE SERPL-MCNC: 99 MG/DL (ref 70–99)
HCT VFR BLD AUTO: 37.1 % (ref 40–53)
HGB BLD-MCNC: 12.2 G/DL (ref 13.3–17.7)
IMM GRANULOCYTES # BLD: 0 10E9/L (ref 0–0.4)
IMM GRANULOCYTES NFR BLD: 0.2 %
LYMPHOCYTES # BLD AUTO: 1.7 10E9/L (ref 0.8–5.3)
LYMPHOCYTES NFR BLD AUTO: 36.5 %
MCH RBC QN AUTO: 29.7 PG (ref 26.5–33)
MCHC RBC AUTO-ENTMCNC: 32.9 G/DL (ref 31.5–36.5)
MCV RBC AUTO: 90 FL (ref 78–100)
MONOCYTES # BLD AUTO: 0.3 10E9/L (ref 0–1.3)
MONOCYTES NFR BLD AUTO: 6.4 %
NEUTROPHILS # BLD AUTO: 2.4 10E9/L (ref 1.6–8.3)
NEUTROPHILS NFR BLD AUTO: 52.7 %
NRBC # BLD AUTO: 0 10*3/UL
NRBC BLD AUTO-RTO: 0 /100
PLATELET # BLD AUTO: 315 10E9/L (ref 150–450)
POTASSIUM SERPL-SCNC: 3.1 MMOL/L (ref 3.4–5.3)
PROT SERPL-MCNC: 6.1 G/DL (ref 6.8–8.8)
RBC # BLD AUTO: 4.11 10E12/L (ref 4.4–5.9)
SODIUM SERPL-SCNC: 143 MMOL/L (ref 133–144)
T PALLIDUM IGG+IGM SER QL: NEGATIVE
TSH SERPL DL<=0.005 MIU/L-ACNC: 0.94 MU/L (ref 0.4–4)
WBC # BLD AUTO: 4.5 10E9/L (ref 4–11)

## 2017-07-22 PROCEDURE — 85025 COMPLETE CBC W/AUTO DIFF WBC: CPT | Performed by: PSYCHIATRY & NEUROLOGY

## 2017-07-22 PROCEDURE — 84443 ASSAY THYROID STIM HORMONE: CPT | Performed by: PSYCHIATRY & NEUROLOGY

## 2017-07-22 PROCEDURE — 25000132 ZZH RX MED GY IP 250 OP 250 PS 637: Performed by: PSYCHIATRY & NEUROLOGY

## 2017-07-22 PROCEDURE — 80053 COMPREHEN METABOLIC PANEL: CPT | Performed by: PSYCHIATRY & NEUROLOGY

## 2017-07-22 PROCEDURE — 25000132 ZZH RX MED GY IP 250 OP 250 PS 637: Performed by: EMERGENCY MEDICINE

## 2017-07-22 PROCEDURE — 86803 HEPATITIS C AB TEST: CPT | Performed by: PSYCHIATRY & NEUROLOGY

## 2017-07-22 PROCEDURE — 12400001 ZZH R&B MH UMMC

## 2017-07-22 PROCEDURE — 36415 COLL VENOUS BLD VENIPUNCTURE: CPT | Performed by: PSYCHIATRY & NEUROLOGY

## 2017-07-22 PROCEDURE — 86780 TREPONEMA PALLIDUM: CPT | Performed by: PSYCHIATRY & NEUROLOGY

## 2017-07-22 PROCEDURE — 87389 HIV-1 AG W/HIV-1&-2 AB AG IA: CPT | Performed by: PSYCHIATRY & NEUROLOGY

## 2017-07-22 RX ADMIN — LABETALOL HCL 200 MG: 200 TABLET, FILM COATED ORAL at 09:39

## 2017-07-22 RX ADMIN — LABETALOL HCL 200 MG: 200 TABLET, FILM COATED ORAL at 21:19

## 2017-07-22 RX ADMIN — LISINOPRIL 40 MG: 40 TABLET ORAL at 09:39

## 2017-07-22 RX ADMIN — ATORVASTATIN CALCIUM 10 MG: 10 TABLET, FILM COATED ORAL at 09:41

## 2017-07-22 RX ADMIN — RISPERIDONE 1 MG: 1 TABLET ORAL at 21:19

## 2017-07-22 RX ADMIN — AMLODIPINE BESYLATE 10 MG: 10 TABLET ORAL at 09:39

## 2017-07-22 RX ADMIN — RANITIDINE HYDROCHLORIDE 150 MG: 150 TABLET, FILM COATED ORAL at 18:42

## 2017-07-22 RX ADMIN — RANITIDINE HYDROCHLORIDE 150 MG: 150 TABLET, FILM COATED ORAL at 09:38

## 2017-07-22 RX ADMIN — TRAZODONE HYDROCHLORIDE 50 MG: 50 TABLET ORAL at 21:23

## 2017-07-22 RX ADMIN — ACETAMINOPHEN 650 MG: 325 TABLET, FILM COATED ORAL at 21:20

## 2017-07-22 ASSESSMENT — ACTIVITIES OF DAILY LIVING (ADL)
DRESS: INDEPENDENT
GROOMING: INDEPENDENT
ORAL_HYGIENE: INDEPENDENT

## 2017-07-22 NOTE — H&P
"H&P dictated  Patient seen, chart reviewed, care discussed with staff.    Blood pressure 133/68, pulse 68, temperature 97.3  F (36.3  C), resp. rate 16, height 1.676 m (5' 6\"), weight 102.1 kg (225 lb), SpO2 99 %.    Alert.  Affect good, varies appropriately.  Speech normal.  Eye contact good.  Psychomotor behavior and gait  normal.  No delusions or hallucinations.  Thoughts logical.  Associations intact. Cognitions intact.  Not suicidal today.    Plan: continue Risperdal    "

## 2017-07-22 NOTE — PROGRESS NOTES
07/22/17 1033   Behavioral Health   Hallucinations denies / not responding to hallucinations   Thinking poor concentration   Orientation person: oriented;place: oriented;date: oriented   Memory baseline memory   Insight poor   Judgement impaired   Eye Contact at examiner   Affect blunted, flat   Mood depressed   Physical Appearance/Attire attire appropriate to age and situation   Hygiene well groomed   Suicidality other (see comments)  (Denies)   Self Injury other (see comment)  (Denies)   Activity isolative;withdrawn  (Napping)   Speech clear;coherent   Psychomotor / Gait balanced;steady   Psycho Education   Type of Intervention 1:1 intervention   Response participates, initiates socially appropriate   Hours 0.5   Treatment Detail (Check In)   Activities of Daily Living   Hygiene/Grooming independent   Oral Hygiene independent   Dress independent   Room Organization independent   Patient was admitting during the overnight shift and he reports being tired and needing to catch up on sleep.  Patient reports feeling depressed.  Patient did not want to have a long check in, but was willing to briefly talk.  Patient denies SI and SiB.  Patient was in his room sleeping most of the morning, which was his goal for the shift.

## 2017-07-22 NOTE — PROGRESS NOTES
"Pt continues to decline to complete admission profile with this writer. Pt states, \"I'll do it when I have energy!\"    Addendum: After lunch pt became agreeable to completing admission profile.   "

## 2017-07-22 NOTE — DISCHARGE INSTRUCTIONS
Behavioral Discharge Planning and Instructions      Summary:  You were admitted on 7/21/2017  For Chemical Use Issues.  You were treated by Dr. Daniel Lowry MD and discharged on ***/***/*** from Station *** to {[facility] D/C Locations:977815}      Main Diagnosis:       Health Care Follow-up Appointments:   Dr. Oshea  [facility]- [address] [facility], [address] [place] [address].   Phone: [phone] Fax: [phone]      If no appointments scheduled, explain ***.  Attend all scheduled appointments with your outpatient providers. Call at least 24 hours in advance if you need to reschedule an appointment to ensure continued access to your outpatient providers.   Major Treatments, Procedures and Findings:  You were provided with: a psychiatric assessment, assessed for medical stability and milieu management    Symptoms to Report: feeling more aggressive, increased confusion, losing more sleep, mood getting worse or thoughts of suicide    Early warning signs can include: increased depression or anxiety sleep disturbances increased thoughts or behaviors of suicide or self-harm  increased unusual thinking, such as paranoia or hearing voices    Safety and Wellness:  {Age Group Safety Wellness:756103}    Resources:   Crisis Intervention: 590.530.5750 or 189-411-7923 (TTY: 125.769.2299).  Call anytime for help.  Alcoholics Anonymous (www.alcoholics-anonymous.org): Check your phone book for your local chapter.  Mental Health Consumer/Survivor Network of [place] (www.mhcsn.net): 733.656.2779 or 686-302-6220  Mental [place] Association [place] (www.mentalhealth.org): 148.414.4929 or 482-551-8977    {AVS END STATEMENT:337924}

## 2017-07-22 NOTE — PROGRESS NOTES
Initial Psychosocial Assessment    I have reviewed the chart, met with the patient, and developed Care Plan. Information for assessment was obtained from: Pt, medical record                                                        voluntary    Presenting Problem: Pt drove to the [facility] [place]. He denies any thoughts of suicide to this writer but feels hopeless after relapsing on cocaine.      History of Mental Health and Chemical Dependency:  More than 13 prior hospitalizations at [facility] for similar presentation, most recent 5/17 on station 22. Multiple hospitalizations at [facility], [facility] and [facility]    Four prior suicide attempts      Significant Life Events   (Illness, Abuse, Trauma, Death): none reported    Family: born and raised in [place], has lived in [place] for 10 years, never  two children ages 5 and 3 who live with their mother    Living Situation: living out of car      Educational Background: HS grad       Financial Status: unemployed, receiving GA, has worked as an     Legal Issues:  Stay of commitment 5/2017    Ethnic/Cultural Issues:     Spiritual Orientation:  Spiritual but no Christian     Service History: none    Social Functioning (organization, interests): music    Current Treatment Providers are:    [place] [address], [phone]   FAX: [phone]    : [place] from [facility]    Social Service Assessment/Plan:   Pt wants sober housing  Manage meds per psychiatry  CTC to contact CM regarding housing  Offer groups for coping skills

## 2017-07-23 VITALS
SYSTOLIC BLOOD PRESSURE: 146 MMHG | BODY MASS INDEX: 36.96 KG/M2 | TEMPERATURE: 97.8 F | HEART RATE: 98 BPM | DIASTOLIC BLOOD PRESSURE: 71 MMHG | HEIGHT: 66 IN | OXYGEN SATURATION: 99 % | RESPIRATION RATE: 16 BRPM | WEIGHT: 230 LBS

## 2017-07-23 PROCEDURE — 36415 COLL VENOUS BLD VENIPUNCTURE: CPT | Performed by: PSYCHIATRY & NEUROLOGY

## 2017-07-23 PROCEDURE — 82306 VITAMIN D 25 HYDROXY: CPT | Performed by: PSYCHIATRY & NEUROLOGY

## 2017-07-23 PROCEDURE — 25000132 ZZH RX MED GY IP 250 OP 250 PS 637: Performed by: EMERGENCY MEDICINE

## 2017-07-23 RX ADMIN — ATORVASTATIN CALCIUM 10 MG: 10 TABLET, FILM COATED ORAL at 12:07

## 2017-07-23 RX ADMIN — LISINOPRIL 40 MG: 40 TABLET ORAL at 12:07

## 2017-07-23 RX ADMIN — RANITIDINE HYDROCHLORIDE 150 MG: 150 TABLET, FILM COATED ORAL at 12:06

## 2017-07-23 RX ADMIN — LABETALOL HCL 200 MG: 200 TABLET, FILM COATED ORAL at 12:07

## 2017-07-23 RX ADMIN — AMLODIPINE BESYLATE 10 MG: 10 TABLET ORAL at 12:07

## 2017-07-23 ASSESSMENT — ACTIVITIES OF DAILY LIVING (ADL)
DRESS: INDEPENDENT
ORAL_HYGIENE: INDEPENDENT
GROOMING: INDEPENDENT

## 2017-07-23 NOTE — PLAN OF CARE
Problem: Depressive Symptoms  Intervention: Social and Therapeutic Interv (Depressive Symptoms)     Occupational Therapy:  Pt has yet to attend OT-facilitated group sessions. Pt will be encouraged to attend groups and be provided a self assessment form, or writer will review this information verbally with patient.  OT staff will explain the value of OT including pt in treatment planning and offer options to meet needs and identified goals.

## 2017-07-23 NOTE — PLAN OF CARE
Problem: Depressive Symptoms  Goal: Depressive Symptoms  Signs and symptoms of listed problems will be absent or manageable.   Outcome: No Change     48-Hour Nursing Assessment:     Patient is isolative and withdrawn. He usually goes back to his room after meals. He is calm. He denies SI/SIB nor hallucinations. Patient took all of his medications.He is napping during the day. He seldom interacts with peers and staff. His appetite is good. No racing thoughts reported. His speech is clear.

## 2017-07-23 NOTE — DISCHARGE SUMMARY
"Admit note not transcribed at time of dc.  Admit summary:  H&P dictated  Patient seen, chart reviewed, care discussed with staff.     Blood pressure 133/68, pulse 68, temperature 97.3  F (36.3  C), resp. rate 16, height 1.676 m (5' 6\"), weight 102.1 kg (225 lb), SpO2 99 %.     Alert.  Affect good, varies appropriately.  Speech normal.  Eye contact good.  Psychomotor behavior and gait  normal.  No delusions or hallucinations.  Thoughts logical.  Associations intact. Cognitions intact.  Not suicidal today.     Plan: continue Risperdal    He stabilized and was discharged safe to home.    Prescription Medications as of 7/23/2017             melatonin 3 MG tablet Take 2 tablets (6 mg) by mouth nightly as needed for sleep    aspirin 81 MG chewable tablet Take 1 tablet (81 mg) by mouth daily    albuterol (ALBUTEROL) 108 (90 BASE) MCG/ACT Inhaler Inhale 2 puffs into the lungs every 4 hours as needed for shortness of breath / dyspnea    traZODone (DESYREL) 50 MG tablet Take 0.5 tablets (25 mg) by mouth nightly as needed for sleep    atorvastatin (LIPITOR) 10 MG tablet Take 1 tablet (10 mg) by mouth daily    lisinopril (PRINIVIL/ZESTRIL) 40 MG tablet Take 1 tablet (40 mg) by mouth daily    risperiDONE (RISPERDAL) 1 MG tablet Take 1 tablet (1 mg) by mouth At Bedtime    labetalol (NORMODYNE) 200 MG tablet Take 1 tablet (200 mg) by mouth 2 times daily Hold for SBP<110 or HR<55.    amLODIPine (NORVASC) 10 MG tablet Take 1 tablet (10 mg) by mouth daily Hold for SBP<110.    EPINEPHrine 0.3 MG/0.3ML injection Inject 0.3 mLs (0.3 mg) into the muscle once as needed for anaphylaxis    multivitamin, therapeutic (THERA-VIT) TABS tablet Take 1 tablet by mouth daily    ranitidine (ZANTAC) 150 MG tablet Take 1 tablet (150 mg) by mouth 2 times daily    cholecalciferol 2000 UNITS tablet Take 2,000 Units by mouth daily      Facility Administered Medications as of 7/23/2017             atorvastatin (LIPITOR) tablet 10 mg Take 1 tablet (10 mg) " "by mouth daily    lisinopril (PRINIVIL/ZESTRIL) tablet 40 mg Take 1 tablet (40 mg) by mouth daily    risperiDONE (risperDAL) tablet 1 mg Take 1 tablet (1 mg) by mouth daily    labetalol (NORMODYNE) tablet 200 mg Take 1 tablet (200 mg) by mouth every 12 hours    amLODIPine (NORVASC) tablet 10 mg Take 1 tablet (10 mg) by mouth daily    ranitidine (ZANTAC) tablet 150 mg Take 1 tablet (150 mg) by mouth 2 times daily    hydrOXYzine (ATARAX) tablet 50 mg Take 1 tablet (50 mg) by mouth every 4 hours as needed for anxiety    acetaminophen (TYLENOL) tablet 650 mg Take 2 tablets (650 mg) by mouth every 4 hours as needed for mild pain    alum & mag hydroxide-simethicone (MYLANTA ES/MAALOX  ES) suspension 30 mL Take 30 mLs by mouth every 4 hours as needed for indigestion    magnesium hydroxide (MILK OF MAGNESIA) suspension 30 mL Take 30 mLs by mouth nightly as needed for constipation    bisacodyl (DULCOLAX) Suppository 10 mg Place 1 suppository (10 mg) rectally daily as needed for constipation    traZODone (DESYREL) tablet 50 mg Take 1 tablet (50 mg) by mouth nightly as needed for sleep    OLANZapine (zyPREXA) tablet 5 mg Take 1 tablet (5 mg) by mouth every 2 hours as needed for agitation (associated with psychosis or ortega)    Linked Group 1:  \"Or\" Linked Group Details     OLANZapine (zyPREXA) injection 5 mg Inject 5 mg into the muscle every 2 hours as needed for agitation (associated with psychosis or ortega)    Linked Group 1:  \"Or\" Linked Group Details           "

## 2017-07-24 LAB
DEPRECATED CALCIDIOL+CALCIFEROL SERPL-MC: 34 UG/L (ref 20–75)
HCV AB SERPL QL IA: NORMAL
HIV 1+2 AB+HIV1 P24 AG SERPL QL IA: NORMAL

## 2017-07-24 NOTE — PLAN OF CARE
"Problem: Depressive Symptoms  Goal: Depressive Symptoms  Signs and symptoms of listed problems will be absent or manageable.   Outcome: Adequate for Discharge Date Met:  07/23/17  Discharge Assessment     /71  Pulse 98  Temp 97.8  F (36.6  C) (Oral)  Resp 16  Ht 1.676 m (5' 6\")  Wt 104.3 kg (230 lb)  SpO2 99%  BMI 37.12 kg/m2     Pt signed 12 hour intent to leave (please see additional note). This was approved by on-call provider, who had seen pt on previous day. Discharge for 07/23/17 approved, and discharge order placed.    Pt was discharged with all belongings he had upon admission. Pt denies all suicidal thoughts and states \"I was never in here for suicidal thoughts. I just needed rest.\" Pt also denies homicidal thoughts, and reports feeling good and \"rested\". He states that he will keep himself safe, and away from drugs. He has a support system in place that consists of his family and all of his friends. Pt was in no acute distress.    Pt states that he has all scheduled medications in his car, which [facility] at [facility]. Pt was taking bus to this location upon discharge. He says that he will be compliant with his medications.    AVS sheet and belongings sheet signed.      "

## 2017-07-24 NOTE — H&P
"  The patient, date of birth 1985, is a 32-year-old man admitted to [facility] on July 21, 2017.    He was admitted to the hospital to treat depression symptoms in the onset of chemical dependency relapse.  He notes that he left here under the care of Dr. Crawford in late May and went to treatment at [facility].  He was sober until about 3 weeks ago when he began drinking and then relapsed on cocaine and \"it went downhill from there.\"  He states he is now at rock bottom and is here to \"get it right.\"    He states that he had a good job and things were going well and he started \"speeding up.\"    Diagnosis from Dr. Crawford were bipolar illness type 2, cocaine dependency, and he does have a history of alcohol use as well.    He had thoughts of overdosing or shooting himself when he came to the emergency room.    He did experience some chest pain as well.    Troponin was 0.050.     Laboratory work shows potassium slightly low at 3.1, creatinine elevated at 1.6, and GFR decreased at 60.  Protein and albumin are low.  Hemoglobin is low at 12.2, hematocrit low at 37.  Red blood cell count is slightly low at 4.1.  Vital signs show temperature 97.3, pulse 68, heart rate 62, respirations 16, blood pressure 133/68.  SpO2, 99 on room air.     Mental status examination shows an alert man.  He is lying in bed.  He is fully cooperative with the evaluation.  Speech production is normal, motor behavior is normal except for being in bed.  There are no signs of abnormal movements, tremors or restlessness.  Speech production is good, affect is fine and varies with content.  He denies psychosis, denies being suicidal, there is no sign of psychosis.  Associations are intact.  Speech rate and volume are intact.    I think it is possible he had a hypomanic swing not covered by his general med of Risperdal 1 mg per day and this contributed to his relapse first on alcohol and then for at least the last " "week on cocaine.    PLAN:  Plan is to continue the Risperdal.  I discussed adding a mood leveling medicine with him but he states, \"I'd like to keep it simple.\"  Estimated length of stay is 5 days.  Discharge criteria is a safe plan.  He is on a stay of commitment.        Pantera Nolan MD    D:  07/22/2017 11:45 T:  07/24/2017 09:51  Document:  [id number]  "

## 2017-07-27 ENCOUNTER — HOSPITAL ENCOUNTER (EMERGENCY)
Facility: CLINIC | Age: 32
Discharge: HOME | End: 2017-07-27
Attending: EMERGENCY MEDICINE | Admitting: EMERGENCY MEDICINE
Payer: COMMERCIAL

## 2017-07-27 ENCOUNTER — APPOINTMENT (OUTPATIENT)
Dept: GENERAL RADIOLOGY | Facility: CLINIC | Age: 32
End: 2017-07-27
Attending: EMERGENCY MEDICINE
Payer: COMMERCIAL

## 2017-07-27 VITALS
SYSTOLIC BLOOD PRESSURE: 176 MMHG | TEMPERATURE: 98.4 F | HEART RATE: 81 BPM | DIASTOLIC BLOOD PRESSURE: 115 MMHG | BODY MASS INDEX: 35.36 KG/M2 | HEIGHT: 66 IN | WEIGHT: 220 LBS | RESPIRATION RATE: 24 BRPM | OXYGEN SATURATION: 100 %

## 2017-07-27 DIAGNOSIS — R07.9 CHEST PAIN, UNSPECIFIED TYPE: ICD-10-CM

## 2017-07-27 DIAGNOSIS — R07.9 CHEST PAIN, UNSPECIFIED: ICD-10-CM

## 2017-07-27 DIAGNOSIS — F19.20 CHEMICAL DEPENDENCY (H): ICD-10-CM

## 2017-07-27 LAB
ANION GAP SERPL CALCULATED.3IONS-SCNC: 14 MMOL/L (ref 3–14)
BASOPHILS # BLD AUTO: 0 10E9/L (ref 0–0.2)
BASOPHILS NFR BLD AUTO: 0.5 %
BUN SERPL-MCNC: 12 MG/DL (ref 7–30)
CALCIUM SERPL-MCNC: 9.1 MG/DL (ref 8.5–10.1)
CHLORIDE SERPL-SCNC: 100 MMOL/L (ref 94–109)
CO2 SERPL-SCNC: 21 MMOL/L (ref 20–32)
CREAT SERPL-MCNC: 1.51 MG/DL (ref 0.66–1.25)
DIFFERENTIAL METHOD BLD: ABNORMAL
EOSINOPHIL # BLD AUTO: 0.1 10E9/L (ref 0–0.7)
EOSINOPHIL NFR BLD AUTO: 0.6 %
ERYTHROCYTE [DISTWIDTH] IN BLOOD BY AUTOMATED COUNT: 15.1 % (ref 10–15)
GFR SERPL CREATININE-BSD FRML MDRD: 53 ML/MIN/1.7M2
GLUCOSE SERPL-MCNC: 85 MG/DL (ref 70–99)
HCT VFR BLD AUTO: 40.4 % (ref 40–53)
HGB BLD-MCNC: 13.7 G/DL (ref 13.3–17.7)
IMM GRANULOCYTES # BLD: 0 10E9/L (ref 0–0.4)
IMM GRANULOCYTES NFR BLD: 0.1 %
INTERPRETATION ECG - MUSE: NORMAL
LYMPHOCYTES # BLD AUTO: 2.1 10E9/L (ref 0.8–5.3)
LYMPHOCYTES NFR BLD AUTO: 26.9 %
MAGNESIUM SERPL-MCNC: 2.2 MG/DL (ref 1.6–2.3)
MCH RBC QN AUTO: 29.6 PG (ref 26.5–33)
MCHC RBC AUTO-ENTMCNC: 33.9 G/DL (ref 31.5–36.5)
MCV RBC AUTO: 87 FL (ref 78–100)
MONOCYTES # BLD AUTO: 0.7 10E9/L (ref 0–1.3)
MONOCYTES NFR BLD AUTO: 8.6 %
NEUTROPHILS # BLD AUTO: 4.9 10E9/L (ref 1.6–8.3)
NEUTROPHILS NFR BLD AUTO: 63.3 %
NRBC # BLD AUTO: 0 10*3/UL
NRBC BLD AUTO-RTO: 0 /100
PLATELET # BLD AUTO: 368 10E9/L (ref 150–450)
POTASSIUM SERPL-SCNC: 2.9 MMOL/L (ref 3.4–5.3)
RBC # BLD AUTO: 4.63 10E12/L (ref 4.4–5.9)
SODIUM SERPL-SCNC: 135 MMOL/L (ref 133–144)
TROPONIN I SERPL-MCNC: 0.07 UG/L (ref 0–0.04)
WBC # BLD AUTO: 7.8 10E9/L (ref 4–11)

## 2017-07-27 PROCEDURE — 99284 EMERGENCY DEPT VISIT MOD MDM: CPT | Mod: 25 | Performed by: EMERGENCY MEDICINE

## 2017-07-27 PROCEDURE — 85025 COMPLETE CBC W/AUTO DIFF WBC: CPT | Performed by: EMERGENCY MEDICINE

## 2017-07-27 PROCEDURE — 25000132 ZZH RX MED GY IP 250 OP 250 PS 637: Performed by: EMERGENCY MEDICINE

## 2017-07-27 PROCEDURE — 84484 ASSAY OF TROPONIN QUANT: CPT | Performed by: EMERGENCY MEDICINE

## 2017-07-27 PROCEDURE — 99285 EMERGENCY DEPT VISIT HI MDM: CPT | Mod: 25 | Performed by: EMERGENCY MEDICINE

## 2017-07-27 PROCEDURE — 71020 XR CHEST 2 VW: CPT

## 2017-07-27 PROCEDURE — 83735 ASSAY OF MAGNESIUM: CPT | Performed by: EMERGENCY MEDICINE

## 2017-07-27 PROCEDURE — 93010 ELECTROCARDIOGRAM REPORT: CPT | Mod: Z6 | Performed by: EMERGENCY MEDICINE

## 2017-07-27 PROCEDURE — 80048 BASIC METABOLIC PNL TOTAL CA: CPT | Performed by: EMERGENCY MEDICINE

## 2017-07-27 PROCEDURE — 93005 ELECTROCARDIOGRAM TRACING: CPT | Performed by: EMERGENCY MEDICINE

## 2017-07-27 RX ORDER — POTASSIUM CHLORIDE 750 MG/1
40 TABLET, EXTENDED RELEASE ORAL ONCE
Status: COMPLETED | OUTPATIENT
Start: 2017-07-27 | End: 2017-07-27

## 2017-07-27 RX ADMIN — POTASSIUM CHLORIDE 40 MEQ: 750 TABLET, EXTENDED RELEASE ORAL at 08:06

## 2017-07-27 NOTE — ED PROVIDER NOTES
"  History     Chief Complaint   Patient presents with     Addiction Problem     Chest Pain     HPI  Bryon Alonzo is a 32 year old male with a history of polysubstance abuse who presents with chest pain.  He states that he went on a binge this weekend and had alcohol, methamphetamine and cocaine.  He also last used heroin 4 days ago.  He drinks about a 1/2 a liter of vodka per day.  No history of seizures or withdrawals.  He began to have chest pain after using cocaine.  His pain was anterior and non-radiating.  He has a history of hypertension and high cholesterol.  His father has a history of heart disease.  He denies any shortness of breath.  No history of DVT or pulmonary embolism.  No cough or fever.  He states he is homeless and needs some type of shelter.    PAST MEDICAL HISTORY:   Past Medical History:   Diagnosis Date     Asthma      Bipolar disorder (H)      Chronic kidney disease      Hyperlipidemia      Hypertension      LVH (left ventricular hypertrophy) 2015     Polysubstance abuse     Methamphetamine, Cocaine, Alcohol, THC, LSD       PAST SURGICAL HISTORY:   Past Surgical History:   Procedure Laterality Date     NO HISTORY OF SURGERY         FAMILY HISTORY:   Family History   Problem Relation Age of Onset     DIABETES Mother      Hypertension Mother      Coronary Artery Disease Father      KIDNEY DISEASE Father        SOCIAL HISTORY:   Social History   Substance Use Topics     Smoking status: Heavy Tobacco Smoker     Packs/day: 3.00     Smokeless tobacco: Never Used     Alcohol use 0.0 oz/week     0 Standard drinks or equivalent per week      Comment: \"1/2 gallon\" of vodka and 4 loco/ day for past 4 days       Patient's Medications   New Prescriptions    No medications on file   Previous Medications    ALBUTEROL (ALBUTEROL) 108 (90 BASE) MCG/ACT INHALER    Inhale 2 puffs into the lungs every 4 hours as needed for shortness of breath / dyspnea    AMLODIPINE (NORVASC) 10 MG TABLET    Take 1 tablet " "(10 mg) by mouth daily Hold for SBP<110.    ASPIRIN 81 MG CHEWABLE TABLET    Take 1 tablet (81 mg) by mouth daily    ATORVASTATIN (LIPITOR) 10 MG TABLET    Take 1 tablet (10 mg) by mouth daily    CHOLECALCIFEROL 2000 UNITS TABLET    Take 2,000 Units by mouth daily    EPINEPHRINE 0.3 MG/0.3ML INJECTION    Inject 0.3 mLs (0.3 mg) into the muscle once as needed for anaphylaxis    LABETALOL (NORMODYNE) 200 MG TABLET    Take 1 tablet (200 mg) by mouth 2 times daily Hold for SBP<110 or HR<55.    LISINOPRIL (PRINIVIL/ZESTRIL) 40 MG TABLET    Take 1 tablet (40 mg) by mouth daily    MELATONIN 3 MG TABLET    Take 2 tablets (6 mg) by mouth nightly as needed for sleep    MULTIVITAMIN, THERAPEUTIC (THERA-VIT) TABS TABLET    Take 1 tablet by mouth daily    RANITIDINE (ZANTAC) 150 MG TABLET    Take 1 tablet (150 mg) by mouth 2 times daily    RISPERIDONE (RISPERDAL) 1 MG TABLET    Take 1 tablet (1 mg) by mouth At Bedtime    TRAZODONE (DESYREL) 50 MG TABLET    Take 0.5 tablets (25 mg) by mouth nightly as needed for sleep   Modified Medications    No medications on file   Discontinued Medications    No medications on file          Allergies   Allergen Reactions     Banana Anaphylaxis         I have reviewed the Medications, Allergies, Past Medical and Surgical History, and Social History in the Epic system.    Review of Systems   All other systems reviewed and are negative.      Physical Exam   BP: (!) 191/123  Pulse: 81  Heart Rate: 83  Temp: 98.4  F (36.9  C)  Resp: 22  Height: 167.6 cm (5' 6\")  Weight: 99.8 kg (220 lb)  SpO2: 98 %  Physical Exam   Constitutional: He is oriented to person, place, and time. No distress.   HENT:   Head: Normocephalic and atraumatic.   Right Ear: External ear normal.   Left Ear: External ear normal.   Mouth/Throat: Oropharynx is clear and moist. No oropharyngeal exudate.   Eyes: Conjunctivae are normal. Pupils are equal, round, and reactive to light. Right eye exhibits no discharge. Left eye exhibits " no discharge.   Neck: Normal range of motion. Neck supple.   Cardiovascular: Normal rate and intact distal pulses.    Pulmonary/Chest: Effort normal. No respiratory distress. He has no wheezes. He has no rales. He exhibits no tenderness.   Abdominal: Soft. There is no tenderness. There is no rebound and no guarding.   Musculoskeletal: Normal range of motion.   Neurological: He is alert and oriented to person, place, and time.   Skin: Skin is warm and dry. No rash noted. He is not diaphoretic.   Psychiatric: He has a normal mood and affect. His behavior is normal. Thought content normal.   Nursing note and vitals reviewed.      ED Course     ED Course     Procedures             EKG Interpretation:      Interpreted by Anthony Carter  Time reviewed: 531  Symptoms at time of EKG: cp  Rhythm: normal sinus   Rate: normal  Axis: normal  Ectopy: none  Conduction: normal  ST Segments/ T Waves: Non specific t wave changes  Q Waves: none  Comparison to prior: non specific t wave inversions anerolateral    Clinical Impression: non specific t wave changes             Critical Care time:  none             Labs Ordered and Resulted from Time of ED Arrival Up to the Time of Departure from the ED   CBC WITH PLATELETS DIFFERENTIAL - Abnormal; Notable for the following:        Result Value    RDW 15.1 (*)     All other components within normal limits   BASIC METABOLIC PANEL - Abnormal; Notable for the following:     Potassium 2.9 (*)     Creatinine 1.51 (*)     GFR Estimate 53 (*)     All other components within normal limits   TROPONIN I - Abnormal; Notable for the following:     Troponin I ES 0.071 (*)     All other components within normal limits   MAGNESIUM            Assessments & Plan (with Medical Decision Making)   1.  Chest pain    32-year-old male with a history of hypertension and high cholesterol and family history of heart disease presents with chest pain.  He has a history of polysubstance abuse and was using  methamphetamine and cocaine this weekend, when he began to have chest pain.  EKG shows some nonspecific ST segment changes in the anterolateral leads.  Troponin 0.071 with a creatinine of 1.51.  His troponin is close to his recent baseline however given his risk factors we will obtain a stress echo.  No risk for pulmonary embolism and no evidence of other serious pathology to explain his chest pain. CXR pending.  If unchanged second troponin and normal stress echo, he may be discharged.      Addendum:  The patient later changed his mind about further testing and decided to leave.  He had no further chest pain.  He was told to follow up with his PCP to discuss further testing of his chest pain, or return if worsening symptoms.         I have reviewed the nursing notes.    I have reviewed the findings, diagnosis, plan and need for follow up with the patient.    New Prescriptions    No medications on file       Final diagnoses:   None       7/27/2017   [facility], [place], EMERGENCY DEPARTMENT     Anthony Carter MD  07/27/17 0826

## 2017-07-27 NOTE — ED NOTES
PT attempting to leave ED. MD Carter to bedside, removed pt's IV and discussed options. Pt eloped prior to signing AMA form.

## 2017-07-27 NOTE — DISCHARGE INSTRUCTIONS
Please talk to your primary doctor about evaluation of your chest pain.  If any worsening problems, return to the ER.        *CHEST PAIN, UNCERTAIN CAUSE    Based on your exam today, the exact cause of your chest pain is not certain. Your condition does not seem serious at this time, and your pain does not appear to be coming from your heart. However, sometimes the signs of a serious problem take more time to appear. Therefore, watch for the warning signs listed below.  HOME CARE:  1. Rest today and avoid strenuous activity.  2. Take any prescribed medicine as directed.  FOLLOW UP with your doctor in 1-3 days.   GET PROMPT MEDICAL ATTENTION if any of the following occur:    A change in the type of pain: if it feels different, becomes more severe, lasts longer, or begins to spread into your shoulder, arm, neck, jaw or back    Shortness of breath or increased pain with breathing    Weakness, dizziness, or fainting    Cough with blood or dark colored sputum (phlegm)    Fever over 101  F (38.3  C)    Swelling, pain or redness in one leg    [id number] [address], [place] [address]. All rights reserved. This information is not intended as a substitute for professional medical care. Always follow your healthcare professional's instructions.

## 2017-07-27 NOTE — ED NOTES
Pt presents with c/o chest tightness after recent drug binge. Reports that for past 4 days he has been using cocaine, marijuana, alcohol and methamphetamine. Last use of alcohol and cocaine was 1 hr ago. Reports feeling suicidal during drug binges but denies any SI at this time and has contracted for safety. Denies ever having seizures with alcohol withdrawal.

## 2019-01-01 ENCOUNTER — APPOINTMENT (OUTPATIENT)
Dept: CT IMAGING | Facility: CLINIC | Age: 34
End: 2019-01-01
Attending: EMERGENCY MEDICINE
Payer: COMMERCIAL

## 2019-01-01 ENCOUNTER — HOSPITAL ENCOUNTER (EMERGENCY)
Facility: CLINIC | Age: 34
Discharge: HOME | End: 2019-01-01
Attending: EMERGENCY MEDICINE | Admitting: EMERGENCY MEDICINE
Payer: COMMERCIAL

## 2019-01-01 VITALS
DIASTOLIC BLOOD PRESSURE: 117 MMHG | OXYGEN SATURATION: 98 % | BODY MASS INDEX: 39.55 KG/M2 | RESPIRATION RATE: 16 BRPM | HEART RATE: 59 BPM | HEIGHT: 66 IN | TEMPERATURE: 98.1 F | SYSTOLIC BLOOD PRESSURE: 169 MMHG | WEIGHT: 246.1 LBS

## 2019-01-01 DIAGNOSIS — L03.213 PRESEPTAL CELLULITIS OF LEFT EYE: ICD-10-CM

## 2019-01-01 LAB
ANION GAP SERPL CALCULATED.3IONS-SCNC: 8 MMOL/L (ref 3–14)
BASOPHILS # BLD AUTO: 0.1 10E9/L (ref 0–0.2)
BASOPHILS NFR BLD AUTO: 1.4 %
BUN SERPL-MCNC: 21 MG/DL (ref 7–30)
CALCIUM SERPL-MCNC: 8.6 MG/DL (ref 8.5–10.1)
CHLORIDE SERPL-SCNC: 104 MMOL/L (ref 94–109)
CO2 SERPL-SCNC: 25 MMOL/L (ref 20–32)
CREAT SERPL-MCNC: 1.24 MG/DL (ref 0.66–1.25)
DIFFERENTIAL METHOD BLD: ABNORMAL
EOSINOPHIL # BLD AUTO: 0.2 10E9/L (ref 0–0.7)
EOSINOPHIL NFR BLD AUTO: 4.3 %
ERYTHROCYTE [DISTWIDTH] IN BLOOD BY AUTOMATED COUNT: 13.8 % (ref 10–15)
GFR SERPL CREATININE-BSD FRML MDRD: 74 ML/MIN/{1.73_M2}
GLUCOSE SERPL-MCNC: 93 MG/DL (ref 70–99)
HCT VFR BLD AUTO: 38.5 % (ref 40–53)
HGB BLD-MCNC: 13.2 G/DL (ref 13.3–17.7)
IMM GRANULOCYTES # BLD: 0 10E9/L (ref 0–0.4)
IMM GRANULOCYTES NFR BLD: 0 %
LYMPHOCYTES # BLD AUTO: 2.2 10E9/L (ref 0.8–5.3)
LYMPHOCYTES NFR BLD AUTO: 43.3 %
MCH RBC QN AUTO: 28.6 PG (ref 26.5–33)
MCHC RBC AUTO-ENTMCNC: 34.3 G/DL (ref 31.5–36.5)
MCV RBC AUTO: 83 FL (ref 78–100)
MONOCYTES # BLD AUTO: 0.4 10E9/L (ref 0–1.3)
MONOCYTES NFR BLD AUTO: 7.5 %
NEUTROPHILS # BLD AUTO: 2.2 10E9/L (ref 1.6–8.3)
NEUTROPHILS NFR BLD AUTO: 43.5 %
NRBC # BLD AUTO: 0 10*3/UL
NRBC BLD AUTO-RTO: 0 /100
PLATELET # BLD AUTO: 336 10E9/L (ref 150–450)
POTASSIUM SERPL-SCNC: 3.3 MMOL/L (ref 3.4–5.3)
RBC # BLD AUTO: 4.62 10E12/L (ref 4.4–5.9)
SODIUM SERPL-SCNC: 137 MMOL/L (ref 133–144)
WBC # BLD AUTO: 5.1 10E9/L (ref 4–11)

## 2019-01-01 PROCEDURE — 25000128 H RX IP 250 OP 636: Performed by: EMERGENCY MEDICINE

## 2019-01-01 PROCEDURE — 25000125 ZZHC RX 250: Performed by: EMERGENCY MEDICINE

## 2019-01-01 PROCEDURE — 80048 BASIC METABOLIC PNL TOTAL CA: CPT | Performed by: EMERGENCY MEDICINE

## 2019-01-01 PROCEDURE — 70481 CT ORBIT/EAR/FOSSA W/DYE: CPT

## 2019-01-01 PROCEDURE — 85025 COMPLETE CBC W/AUTO DIFF WBC: CPT | Performed by: EMERGENCY MEDICINE

## 2019-01-01 PROCEDURE — 99285 EMERGENCY DEPT VISIT HI MDM: CPT | Mod: 25

## 2019-01-01 RX ORDER — IOPAMIDOL 755 MG/ML
50 INJECTION, SOLUTION INTRAVASCULAR ONCE
Status: COMPLETED | OUTPATIENT
Start: 2019-01-01 | End: 2019-01-01

## 2019-01-01 RX ADMIN — IOPAMIDOL 50 ML: 755 INJECTION, SOLUTION INTRAVENOUS at 02:20

## 2019-01-01 RX ADMIN — SODIUM CHLORIDE 60 ML: 9 INJECTION, SOLUTION INTRAVENOUS at 02:20

## 2019-01-01 ASSESSMENT — ENCOUNTER SYMPTOMS: EYE PAIN: 1

## 2019-01-01 ASSESSMENT — MIFFLIN-ST. JEOR: SCORE: 1994.05

## 2019-01-01 NOTE — ED AVS SNAPSHOT
Emergency Department  [address] [facility] [address]  Phone:  [phone]  Fax:  [phone]                                    Bryon Alonzo   MRN: [id number]    Department:   Emergency Department   Date of Visit:  1/1/2019           After Visit Summary Signature Page    I have received my discharge instructions, and my questions have been answered. I have discussed any challenges I see with this plan with the nurse or doctor.    ..........................................................................................................................................  Patient/Patient Representative Signature      ..........................................................................................................................................  Patient Representative Print Name and Relationship to Patient    ..................................................               ................................................  Date                                   Time    ..........................................................................................................................................  Reviewed by Signature/Title    ...................................................              ..............................................  Date                                               Time          22EPIC Rev 08/18

## 2019-01-01 NOTE — LETTER
January 1, 2019      To Whom It May Concern:      Bryon Alonzo was seen in our Emergency Department today, 01/01/19 and was discharged at approximately 3:30 AM.    Sincerely,        Garry Dillard MD

## 2019-01-01 NOTE — ED NOTES
"Pt reports he has hx of high blood pressure. When asked if he is on medication for HTN, pt reports he has not been in to see his doctor lately for a refill. Pt encouraged to make appt with primary provider for refill. Patient reports, \"Yeah, I should do that... I just work so much, haven't found time to get the doctor. \" Pt asked for note for work due to his visit to ED. Provider notified.   "

## 2019-03-31 NOTE — PLAN OF CARE
Problem: Depressive Symptoms  Goal: Depressive Symptoms  Signs and symptoms of listed problems will be absent or manageable.   -pt will remain safe without any self harm during hospitalization.  -pt will have decreased thoughts of self harm and or suicidal ideation.  -pt will report improved sleep.  -pt will have adequate daily oral intake.  -pt will have improvement in self care and person hygiene.  -pt will spend time out of their room.  -pt will express decrease feelings of hopelessness.  Outcome: No Change  Bryon admitted 1150 via ambulance transfer from [place] ED-signed consent for tx-re introduced to staff and unit-requested juice and crackers-declined admit profile stating he hasn't slept for four days and needs to rest-cordial with staff-slept through lunch-given box lunch at 1445 post meeting with MD-admission profile to follow       [facility]

## 2019-10-01 ENCOUNTER — HEALTH MAINTENANCE LETTER (OUTPATIENT)
Age: 34
End: 2019-10-01

## 2019-12-15 ENCOUNTER — HOSPITAL ENCOUNTER (EMERGENCY)
Facility: CLINIC | Age: 34
Discharge: HOME | End: 2019-12-16
Attending: EMERGENCY MEDICINE | Admitting: EMERGENCY MEDICINE
Payer: COMMERCIAL

## 2019-12-15 ENCOUNTER — APPOINTMENT (OUTPATIENT)
Dept: GENERAL RADIOLOGY | Facility: CLINIC | Age: 34
End: 2019-12-15
Attending: EMERGENCY MEDICINE
Payer: COMMERCIAL

## 2019-12-15 DIAGNOSIS — I10 HYPERTENSION, UNSPECIFIED TYPE: ICD-10-CM

## 2019-12-15 LAB
AMPHETAMINES UR QL SCN: NEGATIVE
ANION GAP SERPL CALCULATED.3IONS-SCNC: 5 MMOL/L (ref 3–14)
BARBITURATES UR QL: NEGATIVE
BASOPHILS # BLD AUTO: 0 10E9/L (ref 0–0.2)
BASOPHILS NFR BLD AUTO: 0.4 %
BENZODIAZ UR QL: NEGATIVE
BUN SERPL-MCNC: 17 MG/DL (ref 7–30)
CALCIUM SERPL-MCNC: 8.7 MG/DL (ref 8.5–10.1)
CANNABINOIDS UR QL SCN: NEGATIVE
CHLORIDE SERPL-SCNC: 103 MMOL/L (ref 94–109)
CO2 SERPL-SCNC: 28 MMOL/L (ref 20–32)
COCAINE UR QL: NEGATIVE
CREAT SERPL-MCNC: 1.34 MG/DL (ref 0.66–1.25)
DIFFERENTIAL METHOD BLD: NORMAL
EOSINOPHIL # BLD AUTO: 0.1 10E9/L (ref 0–0.7)
EOSINOPHIL NFR BLD AUTO: 2.1 %
ERYTHROCYTE [DISTWIDTH] IN BLOOD BY AUTOMATED COUNT: 14.1 % (ref 10–15)
GFR SERPL CREATININE-BSD FRML MDRD: 67 ML/MIN/{1.73_M2}
GLUCOSE SERPL-MCNC: 110 MG/DL (ref 70–99)
HCT VFR BLD AUTO: 41.1 % (ref 40–53)
HGB BLD-MCNC: 14 G/DL (ref 13.3–17.7)
IMM GRANULOCYTES # BLD: 0 10E9/L (ref 0–0.4)
IMM GRANULOCYTES NFR BLD: 0.2 %
INTERPRETATION ECG - MUSE: NORMAL
LYMPHOCYTES # BLD AUTO: 1.5 10E9/L (ref 0.8–5.3)
LYMPHOCYTES NFR BLD AUTO: 28.1 %
MCH RBC QN AUTO: 29.4 PG (ref 26.5–33)
MCHC RBC AUTO-ENTMCNC: 34.1 G/DL (ref 31.5–36.5)
MCV RBC AUTO: 86 FL (ref 78–100)
MONOCYTES # BLD AUTO: 0.3 10E9/L (ref 0–1.3)
MONOCYTES NFR BLD AUTO: 5.4 %
NEUTROPHILS # BLD AUTO: 3.4 10E9/L (ref 1.6–8.3)
NEUTROPHILS NFR BLD AUTO: 63.8 %
NRBC # BLD AUTO: 0 10*3/UL
NRBC BLD AUTO-RTO: 0 /100
NT-PROBNP SERPL-MCNC: 138 PG/ML (ref 0–450)
OPIATES UR QL SCN: NEGATIVE
PCP UR QL SCN: NEGATIVE
PLATELET # BLD AUTO: 308 10E9/L (ref 150–450)
POTASSIUM SERPL-SCNC: 3.5 MMOL/L (ref 3.4–5.3)
RBC # BLD AUTO: 4.76 10E12/L (ref 4.4–5.9)
SODIUM SERPL-SCNC: 136 MMOL/L (ref 133–144)
TROPONIN I SERPL-MCNC: 0.08 UG/L (ref 0–0.04)
WBC # BLD AUTO: 5.3 10E9/L (ref 4–11)

## 2019-12-15 PROCEDURE — 80307 DRUG TEST PRSMV CHEM ANLYZR: CPT | Performed by: EMERGENCY MEDICINE

## 2019-12-15 PROCEDURE — 99285 EMERGENCY DEPT VISIT HI MDM: CPT | Mod: 25

## 2019-12-15 PROCEDURE — 25000128 H RX IP 250 OP 636: Performed by: EMERGENCY MEDICINE

## 2019-12-15 PROCEDURE — 84484 ASSAY OF TROPONIN QUANT: CPT | Performed by: EMERGENCY MEDICINE

## 2019-12-15 PROCEDURE — 71046 X-RAY EXAM CHEST 2 VIEWS: CPT

## 2019-12-15 PROCEDURE — 25000132 ZZH RX MED GY IP 250 OP 250 PS 637: Performed by: EMERGENCY MEDICINE

## 2019-12-15 PROCEDURE — 85025 COMPLETE CBC W/AUTO DIFF WBC: CPT | Performed by: EMERGENCY MEDICINE

## 2019-12-15 PROCEDURE — 93005 ELECTROCARDIOGRAM TRACING: CPT

## 2019-12-15 PROCEDURE — 80048 BASIC METABOLIC PNL TOTAL CA: CPT | Performed by: EMERGENCY MEDICINE

## 2019-12-15 PROCEDURE — 83880 ASSAY OF NATRIURETIC PEPTIDE: CPT | Performed by: EMERGENCY MEDICINE

## 2019-12-15 PROCEDURE — 96374 THER/PROPH/DIAG INJ IV PUSH: CPT

## 2019-12-15 PROCEDURE — 96375 TX/PRO/DX INJ NEW DRUG ADDON: CPT

## 2019-12-15 RX ORDER — ASPIRIN 81 MG/1
324 TABLET, CHEWABLE ORAL ONCE
Status: COMPLETED | OUTPATIENT
Start: 2019-12-15 | End: 2019-12-15

## 2019-12-15 RX ORDER — AMLODIPINE BESYLATE 5 MG/1
10 TABLET ORAL ONCE
Status: COMPLETED | OUTPATIENT
Start: 2019-12-15 | End: 2019-12-15

## 2019-12-15 RX ORDER — METOCLOPRAMIDE HYDROCHLORIDE 5 MG/ML
10 INJECTION INTRAMUSCULAR; INTRAVENOUS ONCE
Status: COMPLETED | OUTPATIENT
Start: 2019-12-15 | End: 2019-12-15

## 2019-12-15 RX ORDER — DEXAMETHASONE SODIUM PHOSPHATE 4 MG/ML
10 INJECTION, SOLUTION INTRA-ARTICULAR; INTRALESIONAL; INTRAMUSCULAR; INTRAVENOUS; SOFT TISSUE ONCE
Status: COMPLETED | OUTPATIENT
Start: 2019-12-15 | End: 2019-12-15

## 2019-12-15 RX ORDER — KETOROLAC TROMETHAMINE 15 MG/ML
15 INJECTION, SOLUTION INTRAMUSCULAR; INTRAVENOUS ONCE
Status: DISCONTINUED | OUTPATIENT
Start: 2019-12-15 | End: 2019-12-15

## 2019-12-15 RX ORDER — LISINOPRIL 10 MG/1
40 TABLET ORAL ONCE
Status: COMPLETED | OUTPATIENT
Start: 2019-12-15 | End: 2019-12-15

## 2019-12-15 RX ORDER — HYDROCHLOROTHIAZIDE 12.5 MG/1
25 CAPSULE ORAL DAILY
Status: DISCONTINUED | OUTPATIENT
Start: 2019-12-16 | End: 2019-12-16 | Stop reason: HOSPADM

## 2019-12-15 RX ADMIN — AMLODIPINE BESYLATE 10 MG: 5 TABLET ORAL at 22:09

## 2019-12-15 RX ADMIN — LISINOPRIL 40 MG: 10 TABLET ORAL at 22:09

## 2019-12-15 RX ADMIN — DEXAMETHASONE SODIUM PHOSPHATE 10 MG: 4 INJECTION, SOLUTION INTRAMUSCULAR; INTRAVENOUS at 23:10

## 2019-12-15 RX ADMIN — ASPIRIN 81 MG 324 MG: 81 TABLET ORAL at 22:21

## 2019-12-15 RX ADMIN — METOCLOPRAMIDE 10 MG: 5 INJECTION, SOLUTION INTRAMUSCULAR; INTRAVENOUS at 22:09

## 2019-12-15 ASSESSMENT — ENCOUNTER SYMPTOMS
SHORTNESS OF BREATH: 1
PHOTOPHOBIA: 1
HEADACHES: 1
NUMBNESS: 0
NECK PAIN: 1
NAUSEA: 1
WEAKNESS: 0

## 2019-12-15 ASSESSMENT — MIFFLIN-ST. JEOR: SCORE: 1916.02

## 2019-12-15 NOTE — ED AVS SNAPSHOT
Emergency Department  [address] [facility] [address]  Phone:  [phone]  Fax:  [phone]                                    Bryon Alonzo   MRN: [id number]    Department:   Emergency Department   Date of Visit:  12/15/2019           After Visit Summary Signature Page    I have received my discharge instructions, and my questions have been answered. I have discussed any challenges I see with this plan with the nurse or doctor.    ..........................................................................................................................................  Patient/Patient Representative Signature      ..........................................................................................................................................  Patient Representative Print Name and Relationship to Patient    ..................................................               ................................................  Date                                   Time    ..........................................................................................................................................  Reviewed by Signature/Title    ...................................................              ..............................................  Date                                               Time          22EPIC Rev 08/18

## 2019-12-16 VITALS
RESPIRATION RATE: 22 BRPM | HEIGHT: 66 IN | SYSTOLIC BLOOD PRESSURE: 145 MMHG | BODY MASS INDEX: 36.96 KG/M2 | TEMPERATURE: 97.9 F | WEIGHT: 230 LBS | OXYGEN SATURATION: 97 % | HEART RATE: 66 BPM | DIASTOLIC BLOOD PRESSURE: 82 MMHG

## 2019-12-16 LAB — TROPONIN I SERPL-MCNC: 0.08 UG/L (ref 0–0.04)

## 2019-12-16 PROCEDURE — 84484 ASSAY OF TROPONIN QUANT: CPT | Performed by: INTERNAL MEDICINE

## 2019-12-16 PROCEDURE — 99205 OFFICE O/P NEW HI 60 MIN: CPT | Performed by: INTERNAL MEDICINE

## 2019-12-16 NOTE — CONSULTS
[facility]    History and Physical - Hospitalist Service       Date of consult:  12/15/2019    Assessment & Plan   Bryon Alonzo is a 34 year old male who presented to the emergency department on 12/15/2019. He presented with complaints of headache, nausea, muscular discomfort and is found to have malignant hypertension.    Malignant hypertension: Patient with headache, nausea, troponin elevation to 0.07 range.  On presentation, systolic blood pressure in the 200s.  Patient reports that he has not been taking his medications for the past month secondary to a transition between his insurances.  I believe he was previously on Medicaid, and now is covered by his work insurance.  Coverage transition has not yet been approved/gone through, and patient was awaiting this process to be complete before picking up his home medications.  Received amlodipine 10 mg, lisinopril 40 mg in the emergency department and blood pressure normalized to the 140 range.  -On discharge, patient will  amlodipine 10 mg daily, chlorthalidone 25 mg daily and resume these medications.  He tells me that his blood pressure medications have already been ordered by his primary care provider, and appears this took place 10/30/2019 by review of outside records.  -Repeat blood pressure at a.m. clinic follow-up    Troponin elevation: Suspect secondary to malignant hypertension as above.  No chest discomfort, though he did report chest discomfort to ER provider.  Troponin of 0.078.  Following control of blood pressure with resumption of home blood pressure medications, patient's headache has now resolved, and he requests to go home.  Given suspected etiology of troponin elevation was malignant hypertension, this seems reasonable.  Normal coronaries in 2015 at [facility] by review of outside records. repeat troponin 0 0.076, and patient was discharged home.  Note that patient has a history of persistently elevated troponin  -Following  resumption of blood pressure medications and control of blood pressure, consider repeat echocardiogram through primary care system for persistent uncontrolled hypertension.  This can likely wait until patient's insurance coverage is ensured unless interval development of symptoms.  2015 echocardiogram through [facility] with moderate concentric LVH.  Ejection fraction of 55 to 60%.  Patient received aspirin 324 mg in the emergency department.  This has not been continued  -Given history of amphetamine and cocaine abuse, urine tox screen was obtained in the emergency department.  This confirms patient's reported sobriety.    History of cocaine and methamphetamine use, history of alcohol use disorder: Patient reports that he has been sober for approximately 2.5 years.  This is consistent with last documented urine drug screen being positive.  Given presentation with malignant hypertension and troponin elevation with admission request for additional cardiac evaluation, I did request urine tox screen to be sent to ensure profound hypertension was not stimulant induced and potentially changing treatment of symptoms to include benzodiazepines.  Urine tox Green returned negative.    Suspected sleep apnea: Discussed with patient as well as wife at bedside.  It appears that patient is already scheduled/awaiting to schedule outpatient sleep study.    Obesity: Patient follows with bariatric clinic through [facility].  Reports weight loss, though has gone back on his diet and subsequently regained his weight.  -Discharged home now given stable troponin and lack of symptoms, follow-up in bariatric clinic at 10 AM as previously scheduled.    Asthma: Not currently in exacerbation.  -Resume prior to admission Flovent and albuterol when available.       Diet: Regular adult diet  DVT Prophylaxis: Ambulate  [place]ke[place] Catheter: not present  Code Status: Full code    Disposition Plan   Expected discharge: Today,  recommended to prior living arrangement with plan to  and resume home antihypertensive medications  Entered: Yehuda Escalera MD 12/16/2019, 12:12 AM     The patient's care was discussed with the patient, patient's significant other at bedside, Dr. Pepper in the emergency department    Yehuda Escalera MD  [facility]    ______________________________________________________________________    Chief Complaint   Headache    History is obtained from patient, patient's significant other at bedside, discussion with Dr. Lipscomb in the emergency department, chart review, and review of outside records including prior coronary angiography, prior echocardiogram through [facility], recent follow-up at [facility] with patient's primary care provider at the end of October.    History of Present Illness   Bryon Alonzo is a 34 year old male who presents to the emergency department for evaluation of headache, single episode of nausea, muscle aches in his shoulders over the preceding weeks.    On initial evaluation in the emergency department, patient is found to have a systolic blood pressure in the 200 range.  History of significant hypertension, historically precipitated in the setting of amphetamine and cocaine abuse.  He reports no chest pain, though a troponin was obtained and was elevated at 0.78.  Note that patient has a history of chronically elevated troponin by review of prior laboratory studies.  Last documented troponin through [place] system was 0.71.    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  With work, he has transitioned off of Medicaid, and is transitioning his insurance to his work insurance.  Unfortunately, during this transition, patient tells me that he did not have coverage that has been approved, and he is waiting for paperwork to go through prior to picking up his home medications.  Patient has not  been taking his medications for the past month including amlodipine, chlorthalidone.  Patient cannot recall all of his medications, though he names these for me,  And this is consistent with most recent PCP note from late October.  Patient tells me that over the past weeks has been experiencing some muscle aches in his shoulders for which massage from his significant other have been palliative.  Today he developed a headache, largely left-sided, which was worse than his typical headaches which he will occasionally experience and for which he finds aspirin to be palliative.  He also had some mild nausea, attempted to eat a small meal this late afternoon, and this resulted in a single episode of emesis.  He did not feel better following episode of emesis, and in fact he continued to feel worse.  Patient recognized that he has had some similar symptoms in the past associated with elevated blood pressures, recognized that he likely had elevated blood pressure given he had been off of his medications.  Patient tells me that he found an old bottle full of labetalol in his medicine cabinet, and took 2 tablets of this.  Given his symptoms persisted, presented to the emergency department for evaluation.    Patient received amlodipine and lisinopril in the emergency department, the systolic blood pressure improved to the 140s range.  Patient did initially still have a headache which persisted and for which he received Reglan and Decadron, though at time of my evaluation of the patient in the emergency department, he is asymptomatic with a systolic blood pressure of 140.  He has no nausea, no chest pain, headache is resolved.    I had met with patient after being requested by Dr. Lipscomb to admit him to observation status in the setting of persistent headache, elevated troponin.  Following my interview and discussion with patient and significant other regarding likely etiology of elevated troponin and headache being malignant  hypertension as well as plan of care for reinitiating antihypertensive therapies and monitoring, patient requests to go home rather than be admitted.    Given resolution of symptoms, improvement in blood pressure with resumption of oral medications, this did not seem unreasonable.  Patient amenable to repeat troponin to ensure stability given history of chronic elevation in troponin.  Patient has follow-up with bariatric clinic at 10 AM today, and can have a recheck of his blood pressure at that time.  Patient tells me that even though he had not filled his antihypertensive prescriptions, he would be retroactively covered if he picked up his medications, and his antihypertensive medications currently available at pharmacy are typically only $4 regardless.  He tells me that he will be able to pick these up and initiate his medications.  I discussed this with Dr. Lipscomb as well, if troponin stable and not increasing, can discharge home with close follow-up as he is asymptomatic.  I was not present in the emergency department at time of troponin return, though it appears patient was discharged uneventfully with troponin of 0.076.  As above, consider outpatient TTE through primary system when insurance has cleared and blood pressure better controlled    Review of Systems    The 10 point Review of Systems is negative other than noted in the HPI or here.  Single episode nausea with emesis  Left-sided headache, now resolved  Shortness of breath with exertion has been increasing over the past month.  Currently not short of breath    Past Medical History    I have reviewed this patient's medical history and updated it with pertinent information if needed.   Past Medical History:   Diagnosis Date     ACS (acute coronary syndrome) (H)      Amphetamine and psychostimulant intoxication (H)      Asthma      Bipolar disorder (H)      Chronic kidney disease      Cocaine abuse (H)      Hyperlipidemia      Hypertension      LVH (left  "ventricular hypertrophy) 2015     NSTEMI (non-ST elevated myocardial infarction) (H)      Polysubstance abuse (H)     Methamphetamine, Cocaine, Alcohol, THC, LSD     Suicidal behavior      Suicidal ideation        Past Surgical History   I have reviewed this patient's surgical history and updated it with pertinent information if needed.  Past Surgical History:   Procedure Laterality Date     NO HISTORY OF SURGERY         Social History   I have reviewed this patient's social history and updated it with pertinent information if needed.  Social History     Tobacco Use     Smoking status: Former Smoker     Packs/day: 3.00     Smokeless tobacco: Never Used   Substance Use Topics     Alcohol use: Not Currently     Alcohol/week: 0.0 standard drinks     Comment: \"1/2 gallon\" of vodka and 4 loco/ day for past 4 days     Drug use: Not Currently     Types: Cocaine, Methamphetamines, Marijuana     Comment: cocaine, crack, marijuana and meth x past 4 days       Family History   I have reviewed this patient's family history and updated it with pertinent information if needed.   Family History   Problem Relation Age of Onset     Diabetes Mother      Hypertension Mother      Coronary Artery Disease Father      Kidney Disease Father        Prior to Admission Medications   Prior to Admission Medications   Prescriptions Last Dose Informant Patient Reported? Taking?   EPINEPHrine 0.3 MG/0.3ML injection   No No   Sig: Inject 0.3 mLs (0.3 mg) into the muscle once as needed for anaphylaxis   albuterol (ALBUTEROL) 108 (90 BASE) MCG/ACT Inhaler   No No   Sig: Inhale 2 puffs into the lungs every 4 hours as needed for shortness of breath / dyspnea   amLODIPine (NORVASC) 10 MG tablet   No No   Sig: Take 1 tablet (10 mg) by mouth daily Hold for SBP<110.   aspirin 81 MG chewable tablet   No No   Sig: Take 1 tablet (81 mg) by mouth daily   atorvastatin (LIPITOR) 10 MG tablet   No No   Sig: Take 1 tablet (10 mg) by mouth daily   cholecalciferol " 2000 UNITS tablet   No No   Sig: Take 2,000 Units by mouth daily   labetalol (NORMODYNE) 200 MG tablet   No No   Sig: Take 1 tablet (200 mg) by mouth 2 times daily Hold for SBP<110 or HR<55.   lisinopril (PRINIVIL/ZESTRIL) 40 MG tablet   No No   Sig: Take 1 tablet (40 mg) by mouth daily   melatonin 3 MG tablet   No No   Sig: Take 2 tablets (6 mg) by mouth nightly as needed for sleep   multivitamin, therapeutic (THERA-VIT) TABS tablet   No No   Sig: Take 1 tablet by mouth daily   ranitidine (ZANTAC) 150 MG tablet   No No   Sig: Take 1 tablet (150 mg) by mouth 2 times daily   risperiDONE (RISPERDAL) 1 MG tablet   No No   Sig: Take 1 tablet (1 mg) by mouth At Bedtime   traZODone (DESYREL) 50 MG tablet   No No   Sig: Take 0.5 tablets (25 mg) by mouth nightly as needed for sleep      Facility-Administered Medications: None     Allergies   Allergies   Allergen Reactions     Banana Anaphylaxis       Physical Exam   Vital Signs: Temp: 97.9  F (36.6  C) Temp src: Oral BP: (!) 145/83 Pulse: 78 Heart Rate: 78 Resp: 22 SpO2: 96 % O2 Device: None (Room air)    Weight: 230 lbs 0 oz    General Appearance: Obese -American male laying comfortably in bed in no distress.  Appears well.  Eyes: No scleral icterus or injection  HEENT: Normocephalic  Respiratory: Breath sounds are clear bilaterally to auscultation without wheezes or crackles.  No increased work of breathing  Cardiovascular: Regular rate and rhythm with heart rate currently in the 80 range.  No appreciable murmur.  Limited by habitus.  GI: Abdomen obese, soft, nontender to palpation.  No reproducible nausea.  No palpable mass.  Lymph/Hematologic: No pitting edema of lower extremities  Skin: No rashes  Musculoskeletal: *Muscular tone intact in all extremities.  Neurologic: Alert, conversant, appropriate in conversation.  Mental status grossly intact.  Psychiatric: Normal affect, very pleasant    Data   Data reviewed today: I reviewed all medications, new labs and  imaging results over the last 24 hours. I personally reviewed the EKG tracing showing Early repolarization/LVH pattern.    Recent Labs   Lab 12/16/19  0040 12/15/19  2109   WBC  --  5.3   HGB  --  14.0   MCV  --  86   PLT  --  308   NA  --  136   POTASSIUM  --  3.5   CHLORIDE  --  103   CO2  --  28   BUN  --  17   CR  --  1.34*   ANIONGAP  --  5   KATY  --  8.7   GLC  --  110*   TROPI 0.076* 0.078*

## 2019-12-16 NOTE — ED PROVIDER NOTES
History     Chief Complaint:  Chest Pain and Headache      HPI   Bryon Alonzo is a 34 year old male with a history of LVH, NSTEMI, ACS, CKD, hyperlipidemia, hypertension, and substance abuse who presents to the emergency department for evaluation of chest pain and headache. The patient reports that between 1828-1819 today he had the sudden onset of intermittent nausea. He then ate some lunch hoping this would help, but then developed a severe, left sided headache, blurred vision, photophobia, and chest pain. He does get headaches regularly that he manages with Aspirin, but today's was more severe than usual. He was concerned this was due to hypertension so he took two labetalol that he had left over form an old prescription. He then called a friend who brought him to the ED. He's also had severe neck pain and spasming and shortness of breath on exertion for the past two weeks. He has not been able to take any of his prescribed medications since November due to insurance issues. He states he has not used drugs in two years. Patient denies numbness, weakness, or leg swelling.    Cardiac Risk Factors   Sex: Male   Tobacco: Former  Hypertension: Positive  Diabetes: Negative  Hyperlipidemia: Positive  Family History: Positive    PE/DVT Risk Factors   Personal History: Negative  Recent Travel: Negative   Recent Surgery/Hospitalization: Negative  Tobacco: Former  Family History: Negative  Hormone Use: Negative   Cancer: Negative  Trauma: Negative      Allergies:  No Known Drug Allergies     Medications:    Albuterol  Norvasc  Aspirin 81 mg  Lipitor  EpiPen  Normodyne  Lisinopril   Melatonin   Zantac  Risperdal  Desyrel    Hydrochlorothiazide    Past Medical History:    Asthma  Bipolar disorder  CKD  Hyperlipidemia  Hypertension  LVH  Substance abuse - cocaine, meth  Suicidal ideation   ACS  NSTEMI  Chemosis   GERD    Past Surgical History:    History reviewed. No pertinent past surgical history.    Family History:   "  Diabetes  Hypertension  CAD: father (MI at 30)  Kidney disease  Stroke   Alcohol/drug abuse  Asthma   COPD  Liver disease   Migraines   Cerebrovascular disease   Depression     Social History:  Tobacco Use: Former 3 ppd  Alcohol Use: No  Sober from cocaine, marijuana, and methamphetamines for 2 years   PCP: [facility]  Marital Status:  Single      Review of Systems   Eyes: Positive for photophobia and visual disturbance.   Respiratory: Positive for shortness of breath.    Cardiovascular: Positive for chest pain. Negative for leg swelling.   Gastrointestinal: Positive for nausea.   Musculoskeletal: Positive for neck pain.   Neurological: Positive for headaches. Negative for weakness and numbness.   All other systems reviewed and are negative.      Physical Exam     Patient Vitals for the past 24 hrs:   BP Temp Temp src Pulse Heart Rate Resp SpO2 Height Weight   12/16/19 0005 (!) 145/83 -- -- 78 78 22 96 % -- --   12/16/19 0004 -- -- -- -- 80 20 98 % -- --   12/16/19 0000 116/68 -- -- 75 -- -- -- -- --   12/15/19 2345 119/66 -- -- 76 -- -- -- -- --   12/15/19 2101 (!) 200/117 97.9  F (36.6  C) Oral -- 59 20 96 % 1.676 m (5' 6\") 104.3 kg (230 lb)     Physical Exam    Gen: Pleasant, appears stated age.    Eye:   Pupils are equal, round, and reactive.     Sclera non-injected.    ENT:   Moist mucus membranes.     Normal tongue.    Oropharynx without lesions.    Cardiac:     Normal rate and regular rhythm.    No murmurs, gallops, or rubs.   Pulses 2+ bilaterally     Pulmonary:     Clear to auscultation bilaterally.    No wheezes, rales, or rhonchi.    Abdomen:     Normal active bowel sounds.     Abdomen is soft and non-distended, without focal tenderness.    Musculoskeletal:     Normal movement of all extremities without evidence for deficit.    Extremities:    No edema.    Skin:   Warm and dry.    Neurologic:    Non-focal exam without asymmetric weakness or numbness.    Normal " tone    Psychiatric:     Normal affect with appropriate interaction with examiner.    Emergency Department Course   ECG:  @ 2102  Indication: Chest pain, headache  Vent. Rate 56 bpm. TN interval 236 ms. QRS duration 98 ms. QT/QTc 432/416 ms. P-R-T axis 45 13 38.   Sinus bradycardia with 1st degree AV block. Minimal voltage criteria for LVH, may be normal variant. Nonspecific T wave abnormality. Abnormal ECG.  No significant change when compared to previous ECG from 7/27/17   Read @ 2205 by Dr. Lipscomb.    Imaging:  Chest X-Ray. 2 Views:   IMPRESSION: Negative chest.  Reading per radiology.     Radiographic findings were communicated with the patient who voiced understanding of the findings.    Laboratory:  CBC: WBC: 5.3, HGB: 14.0, PLT: 308  BMP: Glucose 110 (H), Creatinine: 1.34 (H), o/w WNL     2109 Troponin I: 0.078 (H)    BNP: 138    Drug abuse screen 77 urine: Negative    Interventions:  2209 Reglan 10 mg IV  2209 Norvasc 10 mg tablet PO  2209 Lisinopril 40 mg tablet PO  2221 Aspirin 324 mg tablet PO  2310 Decadron 10 mg IV  Medications   hydrochlorothiazide (MICROZIDE) capsule 25 mg (has no administration in time range)   metoclopramide (REGLAN) injection 10 mg (10 mg Intravenous Given 12/15/19 2209)   amLODIPine (NORVASC) tablet 10 mg (10 mg Oral Given 12/15/19 2209)   lisinopril (PRINIVIL/ZESTRIL) tablet 40 mg (40 mg Oral Given 12/15/19 2209)   aspirin (ASA) chewable tablet 324 mg (324 mg Oral Given 12/15/19 2221)   dexamethasone (DECADRON) injection 10 mg (10 mg Intravenous Given 12/15/19 2310)       Emergency Department Course:  2125 Nursing notes and vitals reviewed. I performed an exam of the patient as documented above.     EKG was done, interpretation as above.    IV inserted. Medicine administered as documented above. Blood drawn. This was sent to the lab for further testing, results above.    The patient provided a urine sample here in the emergency department. This was sent for laboratory testing,  findings above.     The patient was sent for a chest XR while in the emergency department, findings above.     2248 I rechecked the patient and discussed the results of his workup thus far. His blood pressure has improved and his chest pain resolved.     2315  I consulted with Dr. Escalera of the hospitalist services. They are in agreement to accept the patient for admission.    Findings and plan explained to the Patient who consents to admission. Discussed the patient with Dr. Escalera, who will admit the patient to an observation bed for further monitoring, evaluation, and treatment.    Impression & Plan    Medical Decision Making:  This is a 34 year old male with a history of hypertension, distant history of substance abuse, who presents today with an elevated blood pressure, headache, and chest pain. On exam, the patient is normal neurologically. He has markedly elevated blood pressures. EKG is negative for signs of ischemia. Troponin is elevated to 0.078, likely demand ischemia from severe hypertension. Otherwise, at this point I do not suspect aortic dissection. He has normal mentation and no signs of hypertension encephalopathy or stroke. Creatinine is slightly elevated to 1.34. Blood pressure and chest pain improved dramatically with home doses of medications. The patient has not been able to take those for at least the last two months. Headache still persists but is much improved. Given normal neurologic exam, the patient is otherwise well appearing and improved, I do not think he needs workup for subarachnoid hemorrhage or aneurysm at this time. He will need to be brought in for serial troponin and blood pressure management.     Patient discussed with Dr. Escalera that he would like to be discharged home. Dr. Escalera plans to send home. Troponin flat.  Blood pressure much improved.  Has access to meds tomorrow, symptoms resolved.  Will return for increasing chest pain, headache, syncope, shortness of breath, or other  concerns.    Diagnosis:    ICD-10-CM    1. Hypertensive emergency I16.1 Drug abuse screen 77 urine ([place], , )       Disposition:  Admitted to Dr. Escalera    Scribe Disclosure:  I, Michael Zeng, am serving as a scribe on 12/15/2019 at 9:25 PM to personally document services performed by Juliana Solitario MD based on my observations and the provider's statements to me.     Michael Zeng  12/15/2019    EMERGENCY DEPARTMENT       Juliana Lipscomb MD  12/16/19 0131

## 2019-12-16 NOTE — ED TRIAGE NOTES
Hx of enlarged heart, kidney disease and high blood pressure. Pt has been out of his medication for a month due to insurance. Pt c/o chest pain, headache. dizziness.

## 2019-12-16 NOTE — PROGRESS NOTES
RECEIVING UNIT ED HANDOFF REVIEW    ED Nurse Handoff Report was reviewed by: Rei Anna RN on December 16, 2019 at 12:22 AM

## 2019-12-27 ENCOUNTER — HOSPITAL ENCOUNTER (EMERGENCY)
Facility: CLINIC | Age: 34
Discharge: HOME | End: 2019-12-27
Attending: EMERGENCY MEDICINE | Admitting: EMERGENCY MEDICINE
Payer: COMMERCIAL

## 2019-12-27 VITALS
TEMPERATURE: 98.8 F | HEART RATE: 82 BPM | WEIGHT: 256 LBS | HEIGHT: 66 IN | SYSTOLIC BLOOD PRESSURE: 164 MMHG | DIASTOLIC BLOOD PRESSURE: 94 MMHG | OXYGEN SATURATION: 100 % | RESPIRATION RATE: 18 BRPM | BODY MASS INDEX: 41.14 KG/M2

## 2019-12-27 DIAGNOSIS — J45.21 MILD INTERMITTENT ASTHMA WITH EXACERBATION: ICD-10-CM

## 2019-12-27 PROCEDURE — 25000125 ZZHC RX 250: Performed by: EMERGENCY MEDICINE

## 2019-12-27 PROCEDURE — 94640 AIRWAY INHALATION TREATMENT: CPT

## 2019-12-27 PROCEDURE — 25000131 ZZH RX MED GY IP 250 OP 636 PS 637: Performed by: EMERGENCY MEDICINE

## 2019-12-27 PROCEDURE — 99283 EMERGENCY DEPT VISIT LOW MDM: CPT | Mod: 25

## 2019-12-27 RX ORDER — IPRATROPIUM BROMIDE AND ALBUTEROL SULFATE 2.5; .5 MG/3ML; MG/3ML
3 SOLUTION RESPIRATORY (INHALATION) ONCE
Status: COMPLETED | OUTPATIENT
Start: 2019-12-27 | End: 2019-12-27

## 2019-12-27 RX ORDER — PREDNISONE 20 MG/1
TABLET ORAL
Qty: 10 TABLET | Refills: 0 | Status: SHIPPED | OUTPATIENT
Start: 2019-12-27

## 2019-12-27 RX ORDER — PREDNISONE 20 MG/1
40 TABLET ORAL ONCE
Status: COMPLETED | OUTPATIENT
Start: 2019-12-27 | End: 2019-12-27

## 2019-12-27 RX ORDER — ALBUTEROL SULFATE 0.83 MG/ML
2.5 SOLUTION RESPIRATORY (INHALATION) EVERY 4 HOURS PRN
Qty: 1 BOX | Refills: 1 | Status: SHIPPED | OUTPATIENT
Start: 2019-12-27 | End: 2020-01-26

## 2019-12-27 RX ADMIN — IPRATROPIUM BROMIDE AND ALBUTEROL SULFATE 3 ML: .5; 3 SOLUTION RESPIRATORY (INHALATION) at 20:47

## 2019-12-27 RX ADMIN — PREDNISONE 40 MG: 20 TABLET ORAL at 20:48

## 2019-12-27 ASSESSMENT — ENCOUNTER SYMPTOMS
SHORTNESS OF BREATH: 1
WHEEZING: 1
CHILLS: 0
FEVER: 0

## 2019-12-27 ASSESSMENT — MIFFLIN-ST. JEOR: SCORE: 2033.96

## 2019-12-27 NOTE — ED AVS SNAPSHOT
Emergency Department  [address] [facility] [address]  Phone:  [phone]  Fax:  [phone]                                    Bryon Alonzo   MRN: [id number]    Department:   Emergency Department   Date of Visit:  12/27/2019           After Visit Summary Signature Page    I have received my discharge instructions, and my questions have been answered. I have discussed any challenges I see with this plan with the nurse or doctor.    ..........................................................................................................................................  Patient/Patient Representative Signature      ..........................................................................................................................................  Patient Representative Print Name and Relationship to Patient    ..................................................               ................................................  Date                                   Time    ..........................................................................................................................................  Reviewed by Signature/Title    ...................................................              ..............................................  Date                                               Time          22EPIC Rev 08/18

## 2019-12-28 NOTE — ED PROVIDER NOTES
"  History     Chief Complaint:  Shortness of Breath      HPI   Bryon Alonzo is a 34 year old male who presents with shortness of breath with a history of asthma, NSTEMI, and left ventricular hypertrophy. The patient complains of difficulty breathing that feels like asthma, but is not relieved by his inhaler. He has tried his inhaler over 15 times without success and he states a nebulizer is the only thing that will help. He denies any leg swelling, fever, or chills. The patient requests to get nebulizer equipment in the hospital and prescription to pick it up in the pharmacy.     Allergies:  Semaglutide     Medications:    Albuterol  Amlodipine  81 mg Aspirin  Lipitor  Epinephrine  Labetalol  Lisinopril  Melatonin  Zantac  Risperdal  Trazodone    Past Medical History:    Cocaine abuse  Chemical dependency  Substance use disorder  NSTEMI  Amphetamine and psychostimulant dependence  Bipolar affective disorder  Acute coronary syndrome  Asthma  Chronic kidney disease  Polysubstance abuse  Left ventricular hypertrophy    Past Surgical History:    History reviewed. No pertinent surgical history.    Family History:    Diabetes  Hypertension  CAD  Kidney disease    Social History:  Smoking status: Former smoker of 3.0 ppd  Alcohol use: Not currently, \"1/2 gallon\" of vodka and 4 loco/day for past 4 days  Drug use: Not currently, cocaine, crack marijuana and meth for the past 4 days.  The patient presents to the emergency department with his significant other.  [facility]  Marital Status:  Single [1]     Review of Systems   Constitutional: Negative for chills and fever.   Respiratory: Positive for shortness of breath and wheezing.    Cardiovascular: Negative for leg swelling.   All other systems reviewed and are negative.        Physical Exam   Patient Vitals for the past 24 hrs:   BP Temp Temp src Pulse Heart Rate Resp SpO2 Height Weight   12/27/19 2114 -- -- -- -- -- -- 100 % -- -- " "  12/27/19 2100 (!) 164/94 -- -- 82 -- -- 100 % -- --   12/27/19 1927 (!) 179/98 98.8  F (37.1  C) Oral 77 77 18 98 % 1.676 m (5' 6\") 116.1 kg (256 lb)     Physical Exam  General: Alert, interactive in mild distress  Head:  Scalp is atraumatic  Eyes:  The pupils are equal, round, and reactive to light    EOM's intact    No scleral icterus  ENT:      Nose:  The external nose is normal  Ears:  External ears are normal  Mouth/Throat: The oropharynx is normal    Mucus membranes are moist      Neck:  Normal range of motion.      There is no rigidity.    Trachea is in the midline         CV:  Regular rate and rhythm    No murmur   Resp:  Mild wheezing diffusely, no retractions or accessory muscle use      GI:  Abdomen is soft, no distension, no tenderness.       MS:  Normal strength in all 4 extremities, No peripheral edema.   Skin:  Warm and dry, No rash or lesions noted.  Neuro: Strength 5/5 x4.  Sensation intact  In all 4 extremities.        Psych:  Awake. Alert.  Normal affect.      Appropriate interactions.    Emergency Department Course   Interventions:  2047 DuoNeb 3 mL Nebulization  2048 Prednisone 40 mg PO    Emergency Department Course:  Past medical records, nursing notes, and vitals reviewed.  2035: I performed an exam of the patient and obtained history, as documented above.     Patient given interventions, as stated above.    2109: I rechecked the patient. Findings and plan explained to the Patient. Patient discharged home with instructions regarding supportive care, medications, and reasons to return. The importance of close follow-up was reviewed.   Impression & Plan    Medical Decision Making:  Following presentation history and physical examination were performed, findings are consistent with an acute asthma exacerbation.  He has had no respiratory distress and I believe he can safely be discharged home.  He received a bronchodilator here and was feeling much improved.  We have discharged him with a " nebulizer machine as well as albuterol nebulizer carpules.  I have also placed him on a 5-day burst of prednisone.  He is got no pain to suggest acute coronary syndrome, pulmonary embolism, or more concerning illness.  There is no fever or significant cough to suggest infectious etiology.  I do not believe he needs advanced imaging.  Patient was in agreement this plan will follow-up with his primary care provider and was subsequently discharged home.    Diagnosis:    ICD-10-CM   1. Mild intermittent asthma with exacerbation J45.21     Disposition:  Discharged to home with instructions for follow up.    Discharge Medications:  New Prescriptions    ALBUTEROL (PROVENTIL) (2.5 MG/3ML) 0.083% NEB SOLUTION    Take 1 vial (2.5 mg) by nebulization every 4 hours as needed for shortness of breath / dyspnea    PREDNISONE (DELTASONE) 20 MG TABLET    Take two tablets (= 40mg) each day for 5 (five) days     Ashlyn Parker  12/27/2019    EMERGENCY DEPARTMENT  Scribe Disclosure:  I, Ashlyn Parker, am serving as a scribe at 8:35 PM on 12/27/2019 to document services personally performed by Juan Lin MD based on my observations and the provider's statements to me.   Scribe Disclosure:  I, Bev Cyr, am serving as a scribe at 9:59 PM on 12/27/2019 to document services personally performed by Juan Lin MD based on my observations and the provider's statements to me.      Juan Lin MD  12/27/19 7207

## 2020-01-18 ENCOUNTER — HOSPITAL ENCOUNTER (EMERGENCY)
Facility: CLINIC | Age: 35
Discharge: HOME | End: 2020-01-18
Attending: EMERGENCY MEDICINE | Admitting: EMERGENCY MEDICINE
Payer: COMMERCIAL

## 2020-01-18 VITALS
HEIGHT: 66 IN | RESPIRATION RATE: 16 BRPM | BODY MASS INDEX: 40.18 KG/M2 | HEART RATE: 70 BPM | OXYGEN SATURATION: 99 % | DIASTOLIC BLOOD PRESSURE: 91 MMHG | WEIGHT: 250 LBS | SYSTOLIC BLOOD PRESSURE: 152 MMHG | TEMPERATURE: 97.5 F

## 2020-01-18 DIAGNOSIS — H10.32 ACUTE CONJUNCTIVITIS OF LEFT EYE, UNSPECIFIED ACUTE CONJUNCTIVITIS TYPE: ICD-10-CM

## 2020-01-18 PROCEDURE — 25000125 ZZHC RX 250: Performed by: EMERGENCY MEDICINE

## 2020-01-18 PROCEDURE — 99283 EMERGENCY DEPT VISIT LOW MDM: CPT

## 2020-01-18 RX ORDER — POLYMYXIN B SULFATE AND TRIMETHOPRIM 1; 10000 MG/ML; [USP'U]/ML
1-2 SOLUTION OPHTHALMIC EVERY 4 HOURS
Qty: 1 BOTTLE | Refills: 0 | Status: SHIPPED | OUTPATIENT
Start: 2020-01-18 | End: 2020-07-11

## 2020-01-18 RX ORDER — PROPARACAINE HYDROCHLORIDE 5 MG/ML
1 SOLUTION/ DROPS OPHTHALMIC ONCE
Status: COMPLETED | OUTPATIENT
Start: 2020-01-18 | End: 2020-01-18

## 2020-01-18 RX ADMIN — FLUORESCEIN SODIUM 1 STRIP: 1 STRIP OPHTHALMIC at 12:17

## 2020-01-18 RX ADMIN — PROPARACAINE HYDROCHLORIDE 1 DROP: 5 SOLUTION/ DROPS OPHTHALMIC at 12:17

## 2020-01-18 ASSESSMENT — ENCOUNTER SYMPTOMS
EYE ITCHING: 1
EYE REDNESS: 1

## 2020-01-18 ASSESSMENT — MIFFLIN-ST. JEOR: SCORE: 2006.74

## 2020-01-18 NOTE — DISCHARGE INSTRUCTIONS
"Discharge Instructions  Conjunctivitis  Conjunctivitis, or \"pinkeye\", is inflammation of the conjunctiva, which is the thin membrane that lines the inner surface of the eyelids and the whites of the eyes.   There are four main types of conjunctivitis: viral, bacterial, allergic, and non-specific. Both bacterial and viral conjunctivitis spread easily from one person to another by contact with the eye or another person s hands, by an object the infected person has touched (such as a door handle), or by sharing an object that has touched their eye (such as a towel or pillowcase). Because of this, children with bacterial conjunctivitis cannot go back to school or  until they have been on antibiotics for 24 hours.  Generally, every Emergency Department visit should have a follow-up clinic visit with either a primary or a specialty clinic/provider. Please follow-up as instructed by your emergency provider today.  VIRAL CONJUNCTIVITIS: The virus that causes the common cold and is often seen as part of a general cold typically causes this type of conjunctivitis.  This type of conjunctivitis is not treated with antibiotics, and usually lasts 3 - 5 days.  An over-the-counter antihistamine/decongestant eye drop may help to relieve the itching and irritation of viral conjunctivitis.  BACTERIAL CONJUNCTIVITIS:  This is treated with an antibiotic ointment or eye drop.  In both bacterial and viral conjunctivitis, do not wear contact lenses until your eye is no longer red.   Your contact case should be thrown away and the contacts disinfected overnight, or replaced if disposable.  NON-SPECIFIC CONJUNCTIVITIS: Sometimes a red eye is caused by other things such as dry eye, chemical exposure, or foreign body in the eye such as dust or eyelash.   All of these problems generally improve on their own within 24 hours.  ALLERGIC CONJUNCTIVITIS: These are eye symptoms caused by allergies. This type of conjunctivitis will be treated " with allergy medications.    Return to the Emergency Department if:  If you have blurry vision.  If you have increasing eye pain or drainage.  If you have new redness or swelling in the skin around the eye.  If you were given a prescription for medicine here today, be sure to read all of the information (including the package insert) that comes with your prescription.  This will include important information about the medicine, its side effects, and any warnings that you need to know about.  The pharmacist who fills the prescription can provide more information and answer questions you may have about the medicine.  If you have questions or concerns that the pharmacist cannot address, please call or return to the Emergency Department.   Remember that you can always come back to the Emergency Department if you are not able to see your regular provider in the amount of time listed above, if you get any new symptoms, or if there is anything that worries you.

## 2020-01-18 NOTE — ED AVS SNAPSHOT
Emergency Department  [address] [facility] [address]  Phone:  [phone]  Fax:  [phone]                                    Bryon Alonzo   MRN: [id number]    Department:   Emergency Department   Date of Visit:  1/18/2020           After Visit Summary Signature Page    I have received my discharge instructions, and my questions have been answered. I have discussed any challenges I see with this plan with the nurse or doctor.    ..........................................................................................................................................  Patient/Patient Representative Signature      ..........................................................................................................................................  Patient Representative Print Name and Relationship to Patient    ..................................................               ................................................  Date                                   Time    ..........................................................................................................................................  Reviewed by Signature/Title    ...................................................              ..............................................  Date                                               Time          22EPIC Rev 08/18

## 2020-01-18 NOTE — ED PROVIDER NOTES
"  History   Chief Complaint:  Red eye    HPI   Bryon Alonzo is a 34 year old male with history of bipolar disorder, chronic kidney disease, hypertension, and hyperlipidemia who presents with red eye. The patient reports yesterday at work he noted his left eye became red and itchy. He denies any vision changes or irritation in front of his ear. He denies any exposure to small particles such as sanding or metal shrapnel. He denies any sick exposures to pink eye but states he has two small children.     Allergies:  Banana  Semaglutide    Medications:   Albuterol  Amlodipine  Aspirin 81 mg   Atorvastatin  Epinephrine   Labetalol  Lisinopril  Multivitamin  Risperidone  Trazodone     Past Medical History:    Acute coronary syndrome   Amphetamine and psychostimulant intoxication   Asthma  Bipolar disorder  Chronic kidney disease  Cocaine abuse  Hyperlipidemia  Hypertension   Left ventricular hypertrophy  NSTEMI  Suicidal behavior and suicidal ideation      Past Surgical History:    The patient does not have any pertinent past surgical history.    Family History:    Mother: diabetes, hypertension  Father: coronary artery disease , kidney disease     Social History:  Smoking Status: former smoker  Smokeless Tobacco: Never Used  Alcohol Use: Not currently  Drug Use: Not currently   Cocaine, methamphetamines, and marijuana   PCP: [facility]     Review of Systems   Eyes: Positive for redness and itching. Negative for visual disturbance.   All other systems reviewed and are negative.    Physical Exam     Patient Vitals for the past 24 hrs:   BP Temp Temp src Pulse Resp SpO2 Height Weight   01/18/20 1114 (!) 152/91 97.5  F (36.4  C) Temporal 70 16 99 % 1.676 m (5' 6\") 113.4 kg (250 lb)       Physical Exam  General: Resting on the gurney, appears comfortable  Head:  The scalp, face, and head appear normal  Eye:   Left eye with conjunctival injection. No foreign body seen.     Fluorescein with woods " lamp without abrasion, ulceration, or evidence of foreign body.     Mouth/Throat: Mucus membranes are moist  CV:  Regular rate    Normal S1 and S2  No pathological murmur   Resp:  Breath sounds clear and equal bilaterally    Non-labored, no retractions or accessory muscle use    No coarseness    No wheezing   GI:  Abdomen is soft, no rigidity    No tenderness to palpation  MS:  Normal motor assessment of all extremities.    Good capillary refill noted.  Skin:   No rash or lesions noted.  Neuro:   Speech is normal and fluent. No apparent deficit.  Psych: Awake. Alert.  Normal affect.      Appropriate interactions.    Emergency Department Course   Interventions:  Fluorescein ophthalmic strip 1 strop left eye   Proparacaine ophthalmic solution 1 drop left eye    Emergency Department Course:  Nursing notes and vitals reviewed.    1134 I performed an exam of the patient as documented above.     1149 I preformed an eye exam with the woods lamp as noted above.     Findings and plan explained to the Patient. Patient discharged home with instructions regarding supportive care, medications, and reasons to return. The importance of close follow-up was reviewed. The patient was prescribed Polytrim     Impression & Plan    Medical Decision Making:  Bryon Alonzo is a 34 year old male who presents for evaluation of a red eye.  A broad differential diagnosis was considered including bacterial conjunctivitis, viral conjunctivitis, foreign body, corneal abrasion, chemical vs allergic conjunctivitis, corneal ulcer, HSV, herpes zoster opthalmicus, endopthalmitis, orbital cellulitis, etc.  Signs and symptoms consistent with a conjunctivitis, likely viral, but will start abx drops in case early bacterial. Will start antibiotics and have close follow-up of eye physician.  No red flag symptoms to suggest any of the above worrisome etiologies.      Diagnosis:    ICD-10-CM    1. Acute conjunctivitis of left eye, unspecified acute  conjunctivitis type H10.32        Disposition:   discharged to home    Discharge Medications:  New Prescriptions    TRIMETHOPRIM-POLYMYXIN B (POLYTRIM) 23244-2.1 UNIT/ML-% OPHTHALMIC SOLUTION    Place 1-2 drops Into the left eye every 4 hours       Scribe Disclosure:  I, Sydney Xavier, am serving as a scribe at 11:30 AM on 1/18/2020 to document services personally performed by Marge Dominguez MD based on my observations and the provider's statements to me.   [facility] EMERGENCY DEPARTMENT       Marge Dominguez MD  01/19/20 1538

## 2020-07-11 ENCOUNTER — HOSPITAL ENCOUNTER (EMERGENCY)
Facility: CLINIC | Age: 35
Discharge: HOME | End: 2020-07-11
Attending: EMERGENCY MEDICINE | Admitting: EMERGENCY MEDICINE
Payer: COMMERCIAL

## 2020-07-11 VITALS
DIASTOLIC BLOOD PRESSURE: 91 MMHG | WEIGHT: 230 LBS | BODY MASS INDEX: 36.96 KG/M2 | RESPIRATION RATE: 16 BRPM | TEMPERATURE: 97.5 F | SYSTOLIC BLOOD PRESSURE: 136 MMHG | HEIGHT: 66 IN | OXYGEN SATURATION: 95 %

## 2020-07-11 DIAGNOSIS — H10.32 ACUTE CONJUNCTIVITIS OF LEFT EYE, UNSPECIFIED ACUTE CONJUNCTIVITIS TYPE: ICD-10-CM

## 2020-07-11 PROCEDURE — 25000125 ZZHC RX 250

## 2020-07-11 PROCEDURE — 99283 EMERGENCY DEPT VISIT LOW MDM: CPT

## 2020-07-11 RX ORDER — PROPARACAINE HYDROCHLORIDE 5 MG/ML
1 SOLUTION/ DROPS OPHTHALMIC ONCE
Status: DISCONTINUED | OUTPATIENT
Start: 2020-07-11 | End: 2020-07-11 | Stop reason: HOSPADM

## 2020-07-11 RX ORDER — POLYMYXIN B SULFATE AND TRIMETHOPRIM 1; 10000 MG/ML; [USP'U]/ML
1-2 SOLUTION OPHTHALMIC EVERY 4 HOURS
Qty: 10 ML | Refills: 0 | Status: SHIPPED | OUTPATIENT
Start: 2020-07-11 | End: 2020-07-18

## 2020-07-11 ASSESSMENT — MIFFLIN-ST. JEOR: SCORE: 1911.02

## 2020-07-11 NOTE — ED AVS SNAPSHOT
Emergency Department  [address] [facility] [address]  Phone:  [phone]  Fax:  [phone]                                    Bryon Alonzo   MRN: [id number]    Department:   Emergency Department   Date of Visit:  7/11/2020           After Visit Summary Signature Page    I have received my discharge instructions, and my questions have been answered. I have discussed any challenges I see with this plan with the nurse or doctor.    ..........................................................................................................................................  Patient/Patient Representative Signature      ..........................................................................................................................................  Patient Representative Print Name and Relationship to Patient    ..................................................               ................................................  Date                                   Time    ..........................................................................................................................................  Reviewed by Signature/Title    ...................................................              ..............................................  Date                                               Time          22EPIC Rev 08/18

## 2020-07-11 NOTE — ED PROVIDER NOTES
History     Chief Complaint:  Eye Problem      HPI   Bryon Alonzo is a 35 year old male with history of hypertension, hyperlipidemia, and NSTEMI who presents with left eye redness.  Symptoms started yesterday, when he noticed some swelling in the eye.  It became progressively more red throughout the day.  He tried Visine without any effect.  This morning, he had increased discharge and the eye was matted shut.  It was slightly itchy, but he has not had any pain in the eye.  Early in the week, he thinks he may have had an eyelash stuck in the eye but symptoms were delayed by another several days.  He denies any visual changes, fever, or other systemic symptoms.  He states that this tends to happen every 6 months or so, but his children do not have other symptoms and he has not had any ill contacts.  He does suffer from seasonal allergies.    Allergies:    Allergies   Allergen Reactions     Banana Anaphylaxis     Semaglutide Itching        Medications:      albuterol (ALBUTEROL) 108 (90 BASE) MCG/ACT Inhaler  albuterol (PROVENTIL) (2.5 MG/3ML) 0.083% neb solution  amLODIPine (NORVASC) 10 MG tablet  aspirin 81 MG chewable tablet  atorvastatin (LIPITOR) 10 MG tablet  cholecalciferol 2000 UNITS tablet  EPINEPHrine 0.3 MG/0.3ML injection  labetalol (NORMODYNE) 200 MG tablet  lisinopril (PRINIVIL/ZESTRIL) 40 MG tablet  melatonin 3 MG tablet  multivitamin, therapeutic (THERA-VIT) TABS tablet  predniSONE (DELTASONE) 20 MG tablet  ranitidine (ZANTAC) 150 MG tablet  risperiDONE (RISPERDAL) 1 MG tablet  traZODone (DESYREL) 50 MG tablet  trimethoprim-polymyxin b (POLYTRIM) 38906-8.1 UNIT/ML-% ophthalmic solution      Past Medical History:      Past Medical History:   Diagnosis Date     ACS (acute coronary syndrome) (H)      Amphetamine and psychostimulant intoxication (H)      Asthma      Bipolar disorder (H)      Chronic kidney disease      Cocaine abuse (H)      Hyperlipidemia      Hypertension      LVH (left ventricular  "hypertrophy) 2015     NSTEMI (non-ST elevated myocardial infarction) (H)      Polysubstance abuse (H)      Suicidal behavior      Suicidal ideation        Past Surgical History:      Past Surgical History:   Procedure Laterality Date     NO HISTORY OF SURGERY         Family History:      Family History   Problem Relation Age of Onset     Diabetes Mother      Hypertension Mother      Coronary Artery Disease Father      Kidney Disease Father        Social History:    Marital Status:  Single [1]  Social History     Tobacco Use     Smoking status: Former Smoker     Packs/day: 3.00     Smokeless tobacco: Never Used   Substance Use Topics     Alcohol use: Not Currently     Alcohol/week: 0.0 standard drinks     Comment: \"1/2 gallon\" of vodka and 4 loco/ day for past 4 days     Drug use: Not Currently     Types: Cocaine, Methamphetamines, Marijuana     Comment: cocaine, crack, marijuana and meth x past 4 days        Review of Systems   Constitutional: Negative for chills and fever.   HENT: Negative for congestion and ear pain.    Eyes: Positive for discharge, redness and itching. Negative for photophobia, pain and visual disturbance.   All other systems reviewed and are negative.      Physical Exam   First Vitals:  BP: (!) 136/91  Heart Rate: 62  Temp: 97.5  F (36.4  C)  Resp: 16  Height: 167.6 cm (5' 6\")  Weight: 104.3 kg (230 lb)  SpO2: 95 %      Physical Exam  Gen:  Pleasant, appears stated age.    L Eye:    Minimal periorbital edema.  VA: OD 20/20, OS 20/20  Lashes - normal  Lids - Palpebral conjunctiva not inflamed.  Lid flipped - no foreign body indentified  Sclera - mildly injected  Pupils - are equal, round, and reactive.  No pain with consensual response.  Fluoroscein/wood's lamp - no fluoroscein uptake in cornea  Slit lamp - No cell/flare in L eye    Musculoskeletal:     Normal movement of all extremities without evidence for deficit.    Extremities:    No edema.  Atraumatic.      Skin:   Warm and dry.  No " rash.    Neurologic:    Non-focal exam without asymmetric weakness or numbness.    Fluent speech.    Psychiatric:     Normal affect with appropriate interaction with examiner.        Emergency Department Course       Emergency Department Course:  Patient taken to eye room for slit lamp exam       Impression & Plan      Medical Decision Making:  Gold Alonzo is a 35-year-old male with history of hypertension, hyperlipidemia, and NSTEMI who presents with left eye redness and discharge.  Visual acuity is intact.  Lack of pain makes endophthalmitis, uveitis, corneal abrasion, or glaucoma much less likely.  Slit-lamp exam does not demonstrate any cell and flare, or corneal defect.  I flipped his eyelid I do not see any evidence of foreign body.  Etiology could be allergic given associated itching although this is not a primary complaint.  He will be treated for conjunctivitis.  He will return to the ED immediately for any worsening vision, fever, pain in the eye, increased discharge, or for other concerns.      Diagnosis:    ICD-10-CM    1. Acute conjunctivitis of left eye, unspecified acute conjunctivitis type  H10.32        Disposition:  discharged to home    Discharge Medications:  Discharge Medication List as of 7/11/2020  9:56 AM          Juliana Lipscomb MD  7/11/2020    EMERGENCY DEPARTMENT       Juliana Lipscomb MD  07/12/20 0910

## 2020-07-12 ASSESSMENT — ENCOUNTER SYMPTOMS
EYE REDNESS: 1
EYE DISCHARGE: 1
EYE ITCHING: 1
CHILLS: 0
EYE PAIN: 0
FEVER: 0
PHOTOPHOBIA: 0

## 2020-11-29 ENCOUNTER — HOSPITAL ENCOUNTER (EMERGENCY)
Facility: CLINIC | Age: 35
Discharge: HOME | End: 2020-11-29
Attending: NURSE PRACTITIONER | Admitting: NURSE PRACTITIONER
Payer: COMMERCIAL

## 2020-11-29 VITALS
OXYGEN SATURATION: 95 % | RESPIRATION RATE: 16 BRPM | TEMPERATURE: 97.9 F | HEART RATE: 79 BPM | DIASTOLIC BLOOD PRESSURE: 103 MMHG | SYSTOLIC BLOOD PRESSURE: 157 MMHG

## 2020-11-29 DIAGNOSIS — I10 HIGH BLOOD PRESSURE: ICD-10-CM

## 2020-11-29 DIAGNOSIS — R31.29 MICROSCOPIC HEMATURIA: ICD-10-CM

## 2020-11-29 DIAGNOSIS — R80.9 PROTEINURIA: ICD-10-CM

## 2020-11-29 DIAGNOSIS — N34.2 URETHRITIS: ICD-10-CM

## 2020-11-29 LAB
ALBUMIN UR-MCNC: 100 MG/DL
APPEARANCE UR: CLEAR
BACTERIA #/AREA URNS HPF: ABNORMAL /HPF
BILIRUB UR QL STRIP: NEGATIVE
COLOR UR AUTO: YELLOW
GLUCOSE UR STRIP-MCNC: NEGATIVE MG/DL
HGB UR QL STRIP: ABNORMAL
KETONES UR STRIP-MCNC: NEGATIVE MG/DL
LEUKOCYTE ESTERASE UR QL STRIP: NEGATIVE
NITRATE UR QL: NEGATIVE
PH UR STRIP: 6.5 PH (ref 5–7)
RBC #/AREA URNS AUTO: 3 /HPF (ref 0–2)
SOURCE: ABNORMAL
SP GR UR STRIP: 1.01 (ref 1–1.03)
UROBILINOGEN UR STRIP-MCNC: NORMAL MG/DL (ref 0–2)
WBC #/AREA URNS AUTO: 3 /HPF (ref 0–5)

## 2020-11-29 PROCEDURE — 87491 CHLMYD TRACH DNA AMP PROBE: CPT | Performed by: NURSE PRACTITIONER

## 2020-11-29 PROCEDURE — 99284 EMERGENCY DEPT VISIT MOD MDM: CPT | Mod: 25

## 2020-11-29 PROCEDURE — 250N000011 HC RX IP 250 OP 636: Performed by: NURSE PRACTITIONER

## 2020-11-29 PROCEDURE — 87591 N.GONORRHOEAE DNA AMP PROB: CPT | Performed by: NURSE PRACTITIONER

## 2020-11-29 PROCEDURE — 86780 TREPONEMA PALLIDUM: CPT | Performed by: NURSE PRACTITIONER

## 2020-11-29 PROCEDURE — 250N000013 HC RX MED GY IP 250 OP 250 PS 637: Performed by: NURSE PRACTITIONER

## 2020-11-29 PROCEDURE — 96372 THER/PROPH/DIAG INJ SC/IM: CPT | Performed by: NURSE PRACTITIONER

## 2020-11-29 PROCEDURE — 81001 URINALYSIS AUTO W/SCOPE: CPT | Performed by: NURSE PRACTITIONER

## 2020-11-29 PROCEDURE — 250N000009 HC RX 250: Performed by: NURSE PRACTITIONER

## 2020-11-29 RX ORDER — AZITHROMYCIN 250 MG/1
1000 TABLET, FILM COATED ORAL ONCE
Status: COMPLETED | OUTPATIENT
Start: 2020-11-29 | End: 2020-11-29

## 2020-11-29 RX ORDER — METRONIDAZOLE 500 MG/1
2000 TABLET ORAL ONCE
Status: COMPLETED | OUTPATIENT
Start: 2020-11-29 | End: 2020-11-29

## 2020-11-29 RX ADMIN — LIDOCAINE HYDROCHLORIDE 250 MG: 10 INJECTION, SOLUTION EPIDURAL; INFILTRATION; INTRACAUDAL; PERINEURAL at 16:08

## 2020-11-29 RX ADMIN — AZITHROMYCIN 1000 MG: 250 TABLET, FILM COATED ORAL at 15:50

## 2020-11-29 RX ADMIN — METRONIDAZOLE 2000 MG: 500 TABLET ORAL at 15:50

## 2020-11-29 ASSESSMENT — ENCOUNTER SYMPTOMS
BACK PAIN: 0
ANAL BLEEDING: 0
CONSTIPATION: 0
DYSURIA: 1
SHORTNESS OF BREATH: 0
HEADACHES: 0
VOMITING: 0
FLANK PAIN: 0
FEVER: 0
DIARRHEA: 0
CHEST TIGHTNESS: 0
HEMATURIA: 0
RECTAL PAIN: 0
ABDOMINAL PAIN: 0
ABDOMINAL DISTENTION: 0
NAUSEA: 0
BLOOD IN STOOL: 0
DIZZINESS: 0
FREQUENCY: 0
FATIGUE: 0

## 2020-11-29 NOTE — ED TRIAGE NOTES
Pt reports swollen lymph nodes in groin and discharge from penis.  Reports partner may have been exposed to STD.

## 2020-11-29 NOTE — ED AVS SNAPSHOT
[facility] Emergency Dept  [address] [facility] [address]  Phone: [phone]  Fax: [phone]                                    Bryon Alonzo   MRN: [id number]    Department: [facility] Emergency Dept   Date of Visit: 11/29/2020           After Visit Summary Signature Page    I have received my discharge instructions, and my questions have been answered. I have discussed any challenges I see with this plan with the nurse or doctor.    ..........................................................................................................................................  Patient/Patient Representative Signature      ..........................................................................................................................................  Patient Representative Print Name and Relationship to Patient    ..................................................               ................................................  Date                                   Time    ..........................................................................................................................................  Reviewed by Signature/Title    ...................................................              ..............................................  Date                                               Time          22EPIC Rev 08/18

## 2020-11-29 NOTE — ED PROVIDER NOTES
History     Chief Complaint:  Exposure to STD (Pt reports swollen lymph nodes in groin and discharge from penis.  Reports partner may have been exposed to STD.)       KOKO Alonzo is a 35 year old male who presents with concern for urethral discharge.  Patient noted onset of white discharge from the penis today and tenderness along the right inguinal canal.  He states in discussion with his girlfriend that he found out she had additional partners and was at risk for sexual transmitted infection.  He denies fevers, scrotal swelling or pain, painful or nonpainful open sores, abdominal pain.    Allergies:  Banana  Semaglutide     Medications:         albuterol (ALBUTEROL) 108 (90 BASE) MCG/ACT Inhaler       albuterol (PROVENTIL) (2.5 MG/3ML) 0.083% neb solution       amLODIPine (NORVASC) 10 MG tablet       aspirin 81 MG chewable tablet       atorvastatin (LIPITOR) 10 MG tablet       cholecalciferol 2000 UNITS tablet       EPINEPHrine 0.3 MG/0.3ML injection       labetalol (NORMODYNE) 200 MG tablet       lisinopril (PRINIVIL/ZESTRIL) 40 MG tablet       melatonin 3 MG tablet       multivitamin, therapeutic (THERA-VIT) TABS tablet       predniSONE (DELTASONE) 20 MG tablet       ranitidine (ZANTAC) 150 MG tablet       risperiDONE (RISPERDAL) 1 MG tablet       traZODone (DESYREL) 50 MG tablet      Past Medical History:    Past Medical History:   Diagnosis Date     ACS (acute coronary syndrome) (H)      Amphetamine and psychostimulant intoxication (H)      Asthma      Bipolar disorder (H)      Chronic kidney disease      Cocaine abuse (H)      Hyperlipidemia      Hypertension      LVH (left ventricular hypertrophy) 2015     NSTEMI (non-ST elevated myocardial infarction) (H)      Polysubstance abuse (H)      Suicidal behavior      Suicidal ideation        Patient Active Problem List    Diagnosis Date Noted     Cocaine use 04/12/2017     Priority: Medium     Chemical dependency (H) 12/26/2016     Priority: Medium      Substance use disorder 09/05/2016     Priority: Medium     NSTEMI (non-ST elevated myocardial infarction) (H) 07/23/2016     Priority: Medium     Amphetamine and psychostimulant dependence, binge pattern (H) 06/23/2016     Priority: Medium     Bipolar affective disorder (H) 06/21/2016     Priority: Medium     ACS (acute coronary syndrome) (H) 06/19/2016     Priority: Medium     Chest pain 05/23/2016     Priority: Medium     Suicidal behavior 05/23/2016     Priority: Medium     Suicidal ideation 01/26/2016     Priority: Medium      Past Surgical History:    Past Surgical History:   Procedure Laterality Date     NO HISTORY OF SURGERY        Family History:    family history includes Coronary Artery Disease in his father; Diabetes in his mother; Hypertension in his mother; Kidney Disease in his father.    Social History:   reports that he has quit smoking. He smoked 3.00 packs per day. He has never used smokeless tobacco. He reports previous alcohol use. He reports previous drug use. Drugs: Cocaine, Methamphetamines, and Marijuana.    PCP: [facility]     Review of Systems   Constitutional: Negative for fatigue and fever.   Respiratory: Negative for chest tightness and shortness of breath.    Cardiovascular: Negative for chest pain and leg swelling.   Gastrointestinal: Negative for abdominal distention, abdominal pain, anal bleeding, blood in stool, constipation, diarrhea, nausea, rectal pain and vomiting.   Genitourinary: Positive for discharge and dysuria. Negative for decreased urine volume, flank pain, frequency, genital sores, hematuria, penile pain, penile swelling, scrotal swelling, testicular pain and urgency.   Musculoskeletal: Negative for back pain.   Skin: Negative for rash.   Neurological: Negative for dizziness and headaches.   All other systems reviewed and are negative.    Physical Exam     Patient Vitals for the past 24 hrs:   BP Temp Temp src Pulse Resp SpO2   11/29/20  1611 (!) 157/103 -- -- 79 -- --   11/29/20 1525 -- -- -- -- -- 95 %   11/29/20 1524 -- -- -- -- -- 100 %   11/29/20 1523 (!) 196/113 97.9  F (36.6  C) Temporal 78 16 98 %        Physical Exam  Nursing notes reviewed. Vitals reviewed.  General: Alert. Well kept.  Eyes:  Conjunctiva non-injected, non-icteric.  Neck/Throat: Moist mucous membranes. Normal voice.  Cardiac: Regular rhythm. Normal heart sounds.  Pulmonary: Clear and equal breath sounds bilaterally.   Abdomen: soft and non-tender  : evaluation performed in presence of chaperone. Circumcized, no penile rash/sores. Bilateral descended testicles, no masses, no swelling, no erythema, no rashes. No tenderness to palpation along bilateral epididymus. No [place] deformity bilateral.    Musculoskeletal: Normal gross range of motion of all 4 extremities.   Neurological: Alert and oriented x4.   Skin: Warm and dry. Normal appearance of visualized exposed skin without rashes or petechiae.  Psych: Affect normal. Good eye contact.    Emergency Department Course   Laboratory:  Labs Ordered and Resulted from Time of ED Arrival Up to the Time of Departure from the ED   ROUTINE UA WITH MICROSCOPIC REFLEX TO CULTURE - Abnormal; Notable for the following components:       Result Value    Blood Urine Small (*)     Protein Albumin Urine 100 (*)     RBC Urine 3 (*)     Bacteria Urine Few (*)     All other components within normal limits   TREPONEMA ABS W REFLEX TO RPR AND TITER   CHLAMYDIA TRACHOMATIS PCR   NEISSERIA GONORRHOEAE PCR        Interventions:  Medications   azithromycin (ZITHROMAX) tablet 1,000 mg (1,000 mg Oral Given 11/29/20 1550)   cefTRIAXone (ROCEPHIN) 250 mg in lidocaine (PF) (XYLOCAINE) 1 % injection (250 mg Intramuscular Given 11/29/20 1608)   metroNIDAZOLE (FLAGYL) tablet 2,000 mg (2,000 mg Oral Given 11/29/20 1550)        Emergency Department Course:  Past medical records, nursing notes, and vitals reviewed.  I performed an exam of the patient and  obtained history, as documented above.    I rechecked the patient. Findings and plan explained to the Patient. Patient was discharged home.    Impression & Plan    Medical Decision Making:  Bryon Alonzo is a 35 year old male who presents with concern for urethral discharge. This is consistent with urethritis.  Given age and risk factors will treat empirically for chlamydia, gonorrhea, trichomonas and will send for testing due to high risk.  Syphilis testing pending.  No open sores or known risk factors to indicate prophylactic treatment today.  No definitive signs of other STI's although patient cautioned he needs testing for HIV and hepatitis.  UA shows microscopic hematuria and proteinuria.  He does have a nephrology follow-up ordered from primary care.  No flank pain or history of kidney stones to indicate need for imaging today. Doubt straight UTI. No signs of systemic symptoms.  No signs of pyelonephritis.  No signs of acute serious autoimmune disease but needs to follow up with primary for further evaluation.       Diagnosis:    ICD-10-CM    1. Urethritis  N34.2    2. High blood pressure  I10    3. Microscopic hematuria  R31.29    4. Proteinuria  R80.9         Discharge Medications:  Discharge Medication List as of 11/29/2020  4:50 PM         11/29/2020   Gary, Cherelle, CNP        Gary, Cherelle, CNP  11/29/20 2017

## 2020-11-30 ENCOUNTER — TELEPHONE (OUTPATIENT)
Dept: EMERGENCY MEDICINE | Facility: CLINIC | Age: 35
End: 2020-11-30

## 2020-11-30 LAB
C TRACH DNA SPEC QL NAA+PROBE: NEGATIVE
N GONORRHOEA DNA SPEC QL NAA+PROBE: POSITIVE
SPECIMEN SOURCE: ABNORMAL
SPECIMEN SOURCE: NORMAL
T PALLIDUM AB SER QL: NONREACTIVE

## 2020-11-30 NOTE — TELEPHONE ENCOUNTER
[facility] result notification:     [place] ED lab result protocol used  N. Gonorrhea protocol     Reason for call  Notify of lab results, assess symptoms,  review ED providers recommendations/discharge instructions (if necessary) and advise per ED lab result f/u protocol     Lab Result   Final N. Gonorrhoeae PCR is POSITIVE.   Patient was treated appropriately in the ED [Yes or No]:  Yes       If Yes, list what was given in the ED:  Azithromycin 1000 mg PO x 1 AND Ceftriaxone (Rocephin) 250 mg IM x 1  If treated appropriately in the [place] ED, notify patient of result and STD instructions.    RN Assessment (Patient s current Symptoms), include time called.  [Insert Left message here if message left]  2:45PM: Patient returned call. States that he is feeling better. Has a follow up appointment scheduled with his PCP for 12/4.    RN Recommendations/Instructions per [place] ED lab result protocol  Patient notified of lab result and treatment recommendations.  RN reviewed information about Gonorrhea from protocol RN Action and CDC frequently asked questions.   STD Patient Instructions:    We recommend that you contact any recent sexual partners within the last 2 months and have them evaluated by a physician.    Avoid sexual activity for 7 to 10 days or until both your and your partner(s) have completed all antibiotic medications.    We advise that you consider following up with your PCP at approximately 3 months for retesting to be sure the infection has cleared.    Advised to keep his follow up appointment as scheduled.  The patient is comfortable with the information given and has no further questions.     Please Contact your PCP clinic or return to the Emergency department if your:    Symptoms return.    Symptoms worsen or other concerning symptom's.    PCP follow-up Questions asked: YES       [RN Name]  Irina Weller RN  [facility] RN  Lung  Nodule and ED Lab Result RN  Epic pool (ED late result f/u RN): [id number]  [facility] INCIDENTAL RADIOLOGY F/U NURSES: [id number]  # [phone]

## 2020-11-30 NOTE — TELEPHONE ENCOUNTER
[facility] result notification:    [place] ED lab result protocol used  N. Gonorrhea protocol    Reason for call  Notify of lab results, assess symptoms,  review ED providers recommendations/discharge instructions (if necessary) and advise per ED lab result f/u protocol    Lab Result   Final N. Gonorrhoeae PCR is POSITIVE.   Patient was treated appropriately in the ED [Yes or No]:  Yes       If Yes, list what was given in the ED:  Azithromycin 1000 mg PO x 1 AND Ceftriaxone (Rocephin) 250 mg IM x 1  If treated appropriately in the [place] ED, notify patient of result and STD instructions.  Information table from ED Provider visit on 11/29/2020  Symptoms reported at ED visit (Chief complaint, HPI) Exposure to STD (Pt reports swollen lymph nodes in groin and discharge from penis.  Reports partner may have been exposed to STD.)        HPI   Bryon Alonzo is a 35 year old male who presents with concern for urethral discharge.  Patient noted onset of white discharge from the penis today and tenderness along the right inguinal canal.  He states in discussion with his girlfriend that he found out she had additional partners and was at risk for sexual transmitted infection.  He denies fevers, scrotal swelling or pain, painful or nonpainful open sores, abdominal pain.   ED providers Impression and Plan (applicable information) Bryon Alonzo is a 35 year old male who presents with concern for urethral discharge. This is consistent with urethritis.  Given age and risk factors will treat empirically for chlamydia, gonorrhea, trichomonas and will send for testing due to high risk.  Syphilis testing pending.  No open sores or known risk factors to indicate prophylactic treatment today.  No definitive signs of other STI's although patient cautioned he needs testing for HIV and hepatitis.  UA shows microscopic hematuria and proteinuria.  He does have a nephrology  follow-up ordered from primary care.  No flank pain or history of kidney stones to indicate need for imaging today. Doubt straight UTI. No signs of systemic symptoms.  No signs of pyelonephritis.  No signs of acute serious autoimmune disease but needs to follow up with primary for further evaluation.    Miscellaneous information Syphilis testing pending     RN Assessment (Patient s current Symptoms), include time called.  [Insert Left message here if message left]  1:51PM: Left voicemail message requesting a call back to [facility] ED Lab Result RN at [phone].  RN is available every day between 10 a.m. and 6:30 p.m..    [RN Name]  Irina Weller RN  [facility] RN  Lung Nodule and ED Lab Result RN  Epic pool (ED late result f/u RN): [id number]  [facility] INCIDENTAL RADIOLOGY F/U NURSES: [id number]  # [phone]      Copy of Lab result

## 2020-12-04 ENCOUNTER — TELEPHONE (OUTPATIENT)
Dept: EMERGENCY MEDICINE | Facility: CLINIC | Age: 35
End: 2020-12-04

## 2020-12-04 NOTE — TELEPHONE ENCOUNTER
"[facility] Emergency Department Lab result notification     Patient/parent Name  Clinton Slater    Reason for call  Patient requesting information on treatment from ER visit 11/29/20  Specifically if treated for trichomonas.      Recommendations/Instructions  Reviewed chart and informed patient has was treated  \"empirically for chlamydia, gonorrhea, trichomonas \" per ER note.      PCP follow-up Questions asked: YES       [RN Name]  Cynthia Corona  [facility]  Emergency Dept Lab Result RN  Ph# [phone]        "

## 2021-01-15 ENCOUNTER — HEALTH MAINTENANCE LETTER (OUTPATIENT)
Age: 36
End: 2021-01-15

## 2021-05-30 NOTE — ED PROVIDER NOTES
"  History     Chief Complaint:  Eye Problem     HPI   Bryon Alonzo is a 33 year old male with a history of chemosis who presents with eye problem. The patient states that he got up form bed as he felt a sharp pain in his eye and went to look in the mirror and it that time his left upper lid swelled and was pulsating. The patient states that it started about 30 minutes ago. The patient states that he has never had this happen before, and he denies any trauma.      Allergies:  Banana      Medications:    Albuterol  Norvasc  Lipitor  Epinephrine  Melatonin   lisinopril  trazodone  Risperdal    Past Medical History:    Asthma   Bipolar disorder   Chronic kidney disease   Hyperlipidemia   Hypertension   LVH (left ventricular hypertrophy)   Polysubstance abuse   cocaine use  chemical dependency  chest pain  ACS  NSTEMI  Suicidal behavior     Past Surgical History:    The patient does not have any pertinent past surgical history.    Family History:    Mother: diabetes, hypertension  Father: CAD, kidney disease    Social History:  The patient was accompanied to the ED by friend.  Smoking Status: heavy tobacco smoker  Smokeless Tobacco: Never Used  Alcohol Use: Positive  Marital Status:  Single     Review of Systems   Eyes: Positive for pain.        Lid swelling   All other systems reviewed and are negative.      Physical Exam     Patient Vitals for the past 24 hrs:   BP Temp Temp src Pulse Resp SpO2 Height Weight   01/01/19 0010 (!) 165/106 98.1  F (36.7  C) Oral 53 16 98 % 1.676 m (5' 6\") 111.6 kg (246 lb 1.6 oz)       Physical Exam  Vitals: reviewed by me  General: Pt seen on [facility], pleasant, cooperative, and alert to conversation  Eyes: Tracking well, clear conjunctiva BL.  Has some swelling and scant erythema to left upper and left lower eyelids.  Minimal pain with extraocular movements, no proptosis.  Does have mild chemosis noted.  Pupils round and reactive.  ENT: MMM, midline trachea.   Lungs: No " tachypnea, no accessory muscle use. No respiratory distress.   CV: Rate as above, regular rhythm.    Abd: Soft, non tender, no guarding, no rebound. Non distended  MSK: no peripheral edema or joint effusion.  No evidence of trauma  Skin: No rash, normal turgor and temperature  Neuro: Clear speech and no facial droop.  Psych: Not RIS, no e/o AH/VH      Emergency Department Course     Imaging:  Radiology findings were communicated with the patient who voiced understanding of the findings.    CT Orbital w Contrast   Superficial edema about the left orbit. No abscess. No  intraorbital abnormality.  Reading per radiology    Laboratory:  Laboratory findings were communicated with the patient who voiced understanding of the findings.    CBC: WBC 5.1, HGB 13.2(L),   BMP: potassium 3.3(L) o/w WNL (Creatinine 1.24)    Interventions:  0220 Iopamidol 50 mL IV  0220 NS flush 60 mL IV    Emergency Department Course:    0030 Nursing notes and vitals reviewed.    0035 I performed an exam of the patient as documented above.     0058 IV was inserted and blood was drawn for laboratory testing, results above.    0225 The patient was sent for a CT Orbital while in the emergency department, results above.     0248 I returned to update the patient. I personally reviewed the laboratory and imaging results with the patient and answered all related questions prior to discharge.    Impression & Plan      Medical Decision Making:  Bryon Alonzo is a 33 year old male who presents to the emergency department today for evaluation of inflammation of this left eye, unclear cause. May have preseptal cellulitis given progression and tenderness to palpation. However, he has normal visual acuity here, he does have some chemosis, but CT scan demonstrates only minimal inflammation and no evidence of overdose cellulitis. Patient's labs are otherwise unremarkable. I do think he will do well supportive therapy. Primary care follow up in the next  72 hours, as well as antibiotics for pre-sepal cellulitis. Patient is okay with plan as well as friend at heart side.     Diagnosis:    ICD-10-CM    1. Preseptal cellulitis of left eye L03.213      Disposition:   The patient is discharged to home.    Discharge Medications:     START taking      Dose / Directions   amoxicillin-clavulanate 875-125 MG tablet  Commonly known as:  AUGMENTIN      Dose:  1 tablet  Take 1 tablet by mouth 2 times daily for 7 days  Quantity:  14 tablet  Refills:  0           Where to get your medicines      Some of these will need a paper prescription and others can be bought over the counter. Ask your nurse if you have questions.    Bring a paper prescription for each of these medications    amoxicillin-clavulanate 875-125 MG tablet         Scribe Disclosure:  I, Sydney Park, am serving as a scribe at 12:41 AM on 1/1/2019 to document services personally performed by Luis Antonio Dillard based on my observations and the provider's statements to me.   EMERGENCY DEPARTMENT       Luis Antonio Dillard MD  01/01/19 0434     Yes

## 2021-10-24 ENCOUNTER — HEALTH MAINTENANCE LETTER (OUTPATIENT)
Age: 36
End: 2021-10-24

## 2021-12-07 ENCOUNTER — OFFICE VISIT (OUTPATIENT)
Dept: URGENT CARE | Facility: URGENT CARE | Age: 36
End: 2021-12-07
Payer: COMMERCIAL

## 2021-12-07 ENCOUNTER — TELEPHONE (OUTPATIENT)
Dept: URGENT CARE | Facility: URGENT CARE | Age: 36
End: 2021-12-07

## 2021-12-07 VITALS
OXYGEN SATURATION: 95 % | TEMPERATURE: 100.2 F | HEART RATE: 83 BPM | BODY MASS INDEX: 41.97 KG/M2 | WEIGHT: 260 LBS | DIASTOLIC BLOOD PRESSURE: 92 MMHG | SYSTOLIC BLOOD PRESSURE: 132 MMHG

## 2021-12-07 DIAGNOSIS — Z20.822 SUSPECTED COVID-19 VIRUS INFECTION: Primary | ICD-10-CM

## 2021-12-07 DIAGNOSIS — J45.21 MILD INTERMITTENT ASTHMA WITH EXACERBATION: ICD-10-CM

## 2021-12-07 LAB
FLUAV AG SPEC QL IA: NEGATIVE
FLUBV AG SPEC QL IA: NEGATIVE
SARS-COV-2 RNA RESP QL NAA+PROBE: POSITIVE

## 2021-12-07 PROCEDURE — 99203 OFFICE O/P NEW LOW 30 MIN: CPT | Performed by: FAMILY MEDICINE

## 2021-12-07 PROCEDURE — U0003 INFECTIOUS AGENT DETECTION BY NUCLEIC ACID (DNA OR RNA); SEVERE ACUTE RESPIRATORY SYNDROME CORONAVIRUS 2 (SARS-COV-2) (CORONAVIRUS DISEASE [COVID-19]), AMPLIFIED PROBE TECHNIQUE, MAKING USE OF HIGH THROUGHPUT TECHNOLOGIES AS DESCRIBED BY CMS-2020-01-R: HCPCS | Performed by: FAMILY MEDICINE

## 2021-12-07 PROCEDURE — 87804 INFLUENZA ASSAY W/OPTIC: CPT | Performed by: FAMILY MEDICINE

## 2021-12-07 PROCEDURE — U0005 INFEC AGEN DETEC AMPLI PROBE: HCPCS | Performed by: FAMILY MEDICINE

## 2021-12-07 RX ORDER — ALBUTEROL SULFATE 90 UG/1
2 AEROSOL, METERED RESPIRATORY (INHALATION) EVERY 4 HOURS PRN
Qty: 18 G | Refills: 0 | Status: SHIPPED | OUTPATIENT
Start: 2021-12-07

## 2021-12-07 RX ORDER — FLUTICASONE PROPIONATE 110 UG/1
1 AEROSOL, METERED RESPIRATORY (INHALATION) 2 TIMES DAILY
Qty: 12 G | Refills: 0 | Status: SHIPPED | OUTPATIENT
Start: 2021-12-07 | End: 2021-12-22

## 2021-12-07 NOTE — TELEPHONE ENCOUNTER
Coronavirus (COVID-19) Notification    Reason for call  Notify of POSITIVE  COVID-19 lab result, assess symptoms,  review [facility] recommendations    Lab Result   Lab test for 2019-nCoV rRt-PCR or SARS-COV-2 PCR  Oropharyngeal AND/OR nasopharyngeal swabs were POSITIVE for 2019-nCoV RNA [OR] SARS-COV-2 RNA (COVID-19) RNA     We have been unable to reach Patient by phone at this time to notify of their Positive COVID-19 result.  Left voicemail message requesting a call back to [phone] [facility] for results.        POSITIVE COVID-19 Letter sent.    Zulay Mckeon LPN

## 2021-12-07 NOTE — LETTER
[facility] [place]  [address] [address]  Phone: [phone]    December 7, 2021      RE:  Bryon Alonzo  [address]  [facility] [address]          To whom it may concern:    RE: Bryon Alonzo    Patient was seen and treated today at our clinic and missed work.    Please excuse Bryon from work until 12/10/2021 due to medical illness.  Thank you.      Sincerely,        Arthur Cruz MD

## 2021-12-07 NOTE — PATIENT INSTRUCTIONS
Patient Education     Viral Upper Respiratory Illness with Wheezing (Adult)    You have a viral upper respiratory illness (URI), which is another term for the common cold. When the infection causes a lot of irritation, the air passages can go into spasm. This causes wheezing and shortness of breath.  This illness is contagious during the first few days. It is spread through the air by coughing and sneezing. It may also be spread by direct contact. This could be by touching the sick person and then touching your own eyes, nose, or mouth. Frequent handwashing will decrease the risk.  Most viral illnesses go away within 7 to 10 days with rest and simple home remedies. Sometimes the illness may last for several weeks. Antibiotics will not kill a virus, and they are generally not prescribed for this condition.  Home care    If symptoms are severe, rest at home for the first 2 to 3 days. When you resume activity, don't let yourself get too tired.    If you smoke, stop. Ask your healthcare provider if you need help.    Stay away from secondhand cigarette smoke. Don't let people smoke in your house or car.    You may use acetaminophen or ibuprofen to control pain and fever, unless another medicine was prescribed. Take the medicine only as directed on the label. If you have chronic liver or kidney disease, have ever had a stomach ulcer or gastrointestinal bleeding, or are taking blood-thinning medicines, talk with your healthcare provider before using these medicines. Aspirin should never be given to anyone under 18 years of age who is ill with a viral infection or fever. It may cause severe liver or brain damage.    Your appetite may be poor, so a light diet is fine. Stay well hydrated by drinking 6 to 8 glasses of fluids per day (water, soft drinks, juices, tea, or soup). Extra fluids will help loosen secretions in the nose and lungs.    Over-the-counter cold medicines will not shorten the length of time you re sick, but  they may be helpful for the following symptoms: cough, sore throat, and nasal and sinus congestion. Ask your healthcare provider or pharmacist which over-the-counter medicine to use. Don't use decongestants if you have high blood pressure.  Follow-up care  Follow up with your healthcare provider, or as advised.  When to seek medical advice  Call your healthcare provider right away if any of these occur:    Cough with lots of colored sputum (mucus)    Severe headache; face, neck, or ear pain    Difficulty swallowing due to throat pain    Fever of 100.4 F (38 C) or higher, or as directed by your healthcare provider  Call 911  Call 911 if any of these occur:    Chest pain, shortness of breath, worsening wheezing, or difficulty breathing    Coughing up blood    Very severe pain when swallowing, especially if it goes along with a muffled voice  Sathya last reviewed this educational content on 6/1/2018 2000-2021 [employer]. All rights reserved. This information is not intended as a substitute for professional medical care. Always follow your healthcare professional's instructions.           Patient Education     Controlling Your Asthma  You can do a lot to manage your asthma and improve your quality of life. You will need to work with your healthcare provider to make a plan. But it s up to you to put this plan into action.  Why you need to take control  You need to control the inflammation in your lungs to feel well and stay healthy. Take all medicine as directed, especially controller medicines. Take them even if you feel that your asthma is under good control. You also need to ease symptoms when you have them. These are long-term tasks. But the more you stay in control, the better you ll feel. If you don t stay in control:    Asthma symptoms may cause you to miss school, work, or activities that you enjoy.    Asthma flare-ups can be dangerous, even deadly.    Uncontrolled asthma makes it more likely  that you will need emergency care.    Uncontrolled asthma may cause lasting damage to your lungs.    Peak flow monitoring helps measure how open your airways are.   Taking medicine helps you control your asthma and ease symptoms when they occur.     Using an Asthma Action Plan will help you keep track of and respond to asthma symptoms.   Staying away from triggers--the things that inflame your airways--will help prevent symptoms and flare-ups.   Your action plan  Your healthcare provider will help you prepare, and when needed, update your personal asthma action plan. Your plan tells you what to do based on your current symptoms. If you don't have an asthma action plan, or if yours isn't up-to-date, make sure you talk with your healthcare provider. Keep a journal of your symptoms and triggers. Take your journal and asthma action plan with you when you visit your healthcare provider. That way, your treatment plan can be reviewed and updated regularly.  Sathya last reviewed this educational content on 5/1/2019 2000-2021 [employer]. All rights reserved. This information is not intended as a substitute for professional medical care. Always follow your healthcare professional's instructions.

## 2021-12-08 NOTE — PROGRESS NOTES
SUBJECTIVE:  Bryon Alonzo, a 36 year old male scheduled an appointment to discuss the following issues:     Suspected COVID-19 virus infection  Fever  Mild intermittent asthma with exacerbation    Medical, social, surgical, and family histories reviewed.     Urgent Care (dizzy, shaky, fever, headaches and then woke this morning with muslce around the eys hurting )    Fever and chills for 4 days.  Has hx of intermittent asthma on Albuterol PRN only.  Pt is not vaccinated for COVID-19.  Has some headache/head pain behind left eye.  No trauma or injuries.  Works form home but has been out and about.    ROS:  See HPI.  No nausea/vomiting.  Low grade fever/chills.  No chest pain/SOB.  No BM/urine problems.  No dizziness or syncope.      OBJECTIVE:  BP (!) 132/92   Pulse 83   Temp 100.2  F (37.9  C)   Wt 117.9 kg (260 lb)   SpO2 95%   BMI 41.97 kg/m    EXAM:  GENERAL APPEARANCE: alert and mild distress; low grade temp; no cyanosis or accessory muscle use; no meningeal signs, moist mucus membrane  EYES: Eyes grossly normal to inspection, PERRL and minimally injected conjunctivae left eye, no discharge; normal EOM  HENT: ear canals and TM's normal and nose and mouth without ulcers or lesions  NECK: no adenopathy, no asymmetry, masses, or scars and thyroid normal to palpation  RESP: mild scattered rhonchi and wheezes bilateral lungs; otherwise fair air entry bilaterally  CV: regular rates and rhythm, normal S1 S2, no S3 or S4 and no murmur, click or rub  LYMPHATICS: no cervical adenopathy  ABDOMEN: soft, nontender, without hepatosplenomegaly or masses and bowel sounds normal  MS: extremities normal- no gross deformities noted  SKIN: no suspicious lesions or rashes  NEURO: Normal strength and tone, mentation intact and speech normal      ASSESSMENT/PLAN:  (Z20.822) Suspected COVID-19 virus infection  (primary encounter diagnosis)  Comment: COVID-19 pending  Plan: Symptomatic COVID-19 Virus (Coronavirus) by PCR         Nose        (R50.9) Fever  Comment: Influenza A & B negative  Plan: Influenza A/B antigen      (J45.21) Mild intermittent asthma with exacerbation  Comment: viral related  Plan: albuterol (PROAIR HFA/PROVENTIL HFA/VENTOLIN         HFA) 108 (90 Base) MCG/ACT inhaler, fluticasone        (FLOVENT HFA) 110 MCG/ACT inhaler    Care instructions given.  Pt to f/up PCP in 1 week if no improvement or worsening.  Warning signs and symptoms explained.

## 2022-02-13 ENCOUNTER — HEALTH MAINTENANCE LETTER (OUTPATIENT)
Age: 37
End: 2022-02-13

## 2022-10-16 ENCOUNTER — HEALTH MAINTENANCE LETTER (OUTPATIENT)
Age: 37
End: 2022-10-16

## 2022-12-06 NOTE — PROGRESS NOTES
04/17/17 1958   Patient Belongings   Did you bring any home meds/supplements to the hospital?  No   Patient Belongings cell phone/electronics;clothing;keys;shoes;wallet   Disposition of Belongings placed in pt locker, valuables placed in unit lockbox   Belongings Search Yes   Clothing Search Yes   Second Staff Mina TRIANA     2ea Cellphone - silver colored  Keys w/ car remote  1pr grey shoes  1pr grey sweatpants  1ea white t-shirt  1ea grey baseball cap  1ea black wallet w/ ID, insurance card and misc cards      Placed in security envelope [id number] (placed in unit lockbox per pt request, to be picked up by friend later)  EBT card - [id number]  Visa - [phone]  Visa - [phone]  Visa - [phone]                   Admission:  I am responsible for any personal items that are not sent to the safe or pharmacy.  [facility] is not responsible for loss, theft or damage of any property in my possession.    Signature:  _________________________________ Date: _______  Time: _____                                              Staff Signature:  ____________________________ Date: ________  Time: _____      2nd Staff person, if patient is unable/unwilling to sign:    Signature: ________________________________ Date: ________  Time: _____     Discharge:  [facility] has returned all of my personal belongings:    Signature: _________________________________ Date: ________  Time: _____                                          Staff Signature:  ____________________________ Date: ________  Time: _____      Clindamycin Counseling: I counseled the patient regarding use of clindamycin as an antibiotic for prophylactic and/or therapeutic purposes. Clindamycin is active against numerous classes of bacteria, including skin bacteria. Side effects may include nausea, diarrhea, gastrointestinal upset, rash, hives, yeast infections, and in rare cases, colitis.

## 2023-03-26 ENCOUNTER — HEALTH MAINTENANCE LETTER (OUTPATIENT)
Age: 38
End: 2023-03-26

## 2024-01-13 ENCOUNTER — HEALTH MAINTENANCE LETTER (OUTPATIENT)
Age: 39
End: 2024-01-13

## 2024-01-13 ENCOUNTER — HOSPITAL ENCOUNTER (EMERGENCY)
Facility: CLINIC | Age: 39
Discharge: HOME | End: 2024-01-13
Attending: EMERGENCY MEDICINE | Admitting: EMERGENCY MEDICINE
Payer: COMMERCIAL

## 2024-01-13 VITALS
OXYGEN SATURATION: 97 % | DIASTOLIC BLOOD PRESSURE: 75 MMHG | HEART RATE: 55 BPM | RESPIRATION RATE: 16 BRPM | WEIGHT: 250 LBS | TEMPERATURE: 98.3 F | HEIGHT: 67 IN | BODY MASS INDEX: 39.24 KG/M2 | SYSTOLIC BLOOD PRESSURE: 134 MMHG

## 2024-01-13 DIAGNOSIS — N34.2 URETHRITIS: ICD-10-CM

## 2024-01-13 LAB
C TRACH DNA SPEC QL NAA+PROBE: NEGATIVE
CLUE CELLS: ABNORMAL
HIV 1+2 AB+HIV1 P24 AG SERPL QL IA: NONREACTIVE
N GONORRHOEA DNA SPEC QL NAA+PROBE: NEGATIVE
T PALLIDUM AB SER QL: NONREACTIVE
TRICHOMONAS, WET PREP: ABNORMAL
WBC'S/HIGH POWER FIELD, WET PREP: ABNORMAL
YEAST, WET PREP: ABNORMAL

## 2024-01-13 PROCEDURE — 87389 HIV-1 AG W/HIV-1&-2 AB AG IA: CPT | Performed by: EMERGENCY MEDICINE

## 2024-01-13 PROCEDURE — 36415 COLL VENOUS BLD VENIPUNCTURE: CPT | Performed by: EMERGENCY MEDICINE

## 2024-01-13 PROCEDURE — 87210 SMEAR WET MOUNT SALINE/INK: CPT | Performed by: EMERGENCY MEDICINE

## 2024-01-13 PROCEDURE — 250N000013 HC RX MED GY IP 250 OP 250 PS 637: Performed by: EMERGENCY MEDICINE

## 2024-01-13 PROCEDURE — 99284 EMERGENCY DEPT VISIT MOD MDM: CPT

## 2024-01-13 PROCEDURE — 87591 N.GONORRHOEAE DNA AMP PROB: CPT | Performed by: EMERGENCY MEDICINE

## 2024-01-13 PROCEDURE — 96372 THER/PROPH/DIAG INJ SC/IM: CPT | Performed by: EMERGENCY MEDICINE

## 2024-01-13 PROCEDURE — 250N000011 HC RX IP 250 OP 636: Performed by: EMERGENCY MEDICINE

## 2024-01-13 PROCEDURE — 250N000009 HC RX 250: Performed by: EMERGENCY MEDICINE

## 2024-01-13 PROCEDURE — 87491 CHLMYD TRACH DNA AMP PROBE: CPT | Performed by: EMERGENCY MEDICINE

## 2024-01-13 PROCEDURE — 86780 TREPONEMA PALLIDUM: CPT | Performed by: EMERGENCY MEDICINE

## 2024-01-13 RX ORDER — DOXYCYCLINE 100 MG/1
100 CAPSULE ORAL 2 TIMES DAILY
Qty: 14 CAPSULE | Refills: 0 | Status: SHIPPED | OUTPATIENT
Start: 2024-01-13 | End: 2024-01-20

## 2024-01-13 RX ORDER — DOXYCYCLINE 100 MG/1
100 CAPSULE ORAL ONCE
Status: COMPLETED | OUTPATIENT
Start: 2024-01-13 | End: 2024-01-13

## 2024-01-13 RX ADMIN — DOXYCYCLINE HYCLATE 100 MG: 100 CAPSULE ORAL at 07:53

## 2024-01-13 RX ADMIN — LIDOCAINE HYDROCHLORIDE 500 MG: 10 INJECTION, SOLUTION EPIDURAL; INFILTRATION; INTRACAUDAL; PERINEURAL at 07:53

## 2024-01-13 ASSESSMENT — ACTIVITIES OF DAILY LIVING (ADL): ADLS_ACUITY_SCORE: 37

## 2024-01-13 NOTE — ED PROVIDER NOTES
History     Chief Complaint:  Exposure to STD       HPI   Bryon Alonzo is a 38 year old male who presents to the emergency department for STD testing.  Patient had unprotected intercourse with a woman and afterwards he developed a whitish discharge from his penis.  [facility]  His discharge gone away but it is since come back.  He denies any sores or rashes in the groin he denies any oral involvement.      Independent Historian:    Patient    Review of External Notes:  None    Medications:    doxycycline hyclate (VIBRAMYCIN) 100 MG capsule  albuterol (ALBUTEROL) 108 (90 BASE) MCG/ACT Inhaler  albuterol (PROAIR HFA/PROVENTIL HFA/VENTOLIN HFA) 108 (90 Base) MCG/ACT inhaler  amLODIPine (NORVASC) 10 MG tablet  aspirin 81 MG chewable tablet  atorvastatin (LIPITOR) 10 MG tablet  cholecalciferol 2000 UNITS tablet  EPINEPHrine 0.3 MG/0.3ML injection  fluticasone (FLOVENT HFA) 110 MCG/ACT inhaler  labetalol (NORMODYNE) 200 MG tablet  lisinopril (PRINIVIL/ZESTRIL) 40 MG tablet  melatonin 3 MG tablet  multivitamin, therapeutic (THERA-VIT) TABS tablet  predniSONE (DELTASONE) 20 MG tablet  ranitidine (ZANTAC) 150 MG tablet  risperiDONE (RISPERDAL) 1 MG tablet  traZODone (DESYREL) 50 MG tablet        Past Medical History:    Past Medical History:   Diagnosis Date    ACS (acute coronary syndrome) (H)     Amphetamine and psychostimulant intoxication (H)     Asthma     Bipolar disorder (H)     Chronic kidney disease     Cocaine abuse (H)     Hyperlipidemia     Hypertension     LVH (left ventricular hypertrophy) 2015    NSTEMI (non-ST elevated myocardial infarction) (H)     Polysubstance abuse (H)     Suicidal behavior     Suicidal ideation        Past Surgical History:    Past Surgical History:   Procedure Laterality Date    NO HISTORY OF SURGERY            Physical Exam   Patient Vitals for the past 24 hrs:   BP Temp Temp src Pulse Resp SpO2 Height Weight   01/13/24 0643 -- 98.3  " F (36.8  C) Oral -- 16 -- -- --   01/13/24 0641 134/75 -- -- 55 -- 97 % 1.702 m (5' 7\") 113.4 kg (250 lb)        Physical Exam  Constitutional: Black male supine no respiratory distress  Genitourinary: Bilateral descended testes no tenderness.  Penis circumcised no sores minimal discharge.  No inguinal adenopathy no inguinal lesions  Lymphadenopathy: No lymphadenopathy.   Neurological: Alert and appropriate skin: Skin is warm and dry. No rash noted. No erythema.      Emergency Department Course     No orders to display         Laboratory:  Labs Ordered and Resulted from Time of ED Arrival to Time of ED Departure   WET PREPARATION - Abnormal       Result Value    Trichomonas Absent      Yeast Absent      Clue Cells Absent      WBCs/high power field 1+ (*)    TREPONEMA ABS W REFLEX TO RPR AND TITER   HIV ANTIGEN ANTIBODY COMBO   NEISSERIA GONORRHOEAE PCR   CHLAMYDIA TRACHOMATIS PCR        Procedures   None    Emergency Department Course & Assessments:             Interventions:  Medications   cefTRIAXone (ROCEPHIN) 500 mg in lidocaine injection (has no administration in time range)   doxycycline hyclate (VIBRAMYCIN) capsule 100 mg (has no administration in time range)        Assessments:  Seen and examined    Independent Interpretation (X-rays, CTs, rhythm strip):  None    Consultations/Discussion of Management or Tests:  None       Social Determinants of Health affecting care:  None     Disposition:  Discharge    Impression & Plan      Medical Decision Making:  Patient presents with concerns for STD exposure.  He developed drainage from his penis after unprotected intercourse with a new partner.  He had initial testing at the [facility] which was negative but his symptoms have recurred and we have sent off testing.  I will empirically treat him with Rocephin and doxycycline and he should follow-up with this clinic in 1 week.  I have told him to use protected intercourse in the time being and make sure he completes his " course of antibiotics.  HIV RPR GC and Chlamydia are pending.        Diagnosis:    ICD-10-CM    1. Urethritis  N34.2            Discharge Medications:  New Prescriptions    DOXYCYCLINE HYCLATE (VIBRAMYCIN) 100 MG CAPSULE    Take 1 capsule (100 mg) by mouth 2 times daily for 14 doses          Macho Cochran MD  1/13/2024   Macho Cochran MD Steinman, Randall Ira, MD  01/13/24 0755

## 2024-01-14 NOTE — RESULT ENCOUNTER NOTE
Final result for both N. Gonorrhoeae PCR and Chlamydia Trachomatis PCR are NEGATIVE.  No treatment or change in treatment per [facility] ED Lab Result N. Gonorrhea AND/OR Chlamydia T. protocol.

## 2024-01-14 NOTE — RESULT ENCOUNTER NOTE
Final result testing for Syphilis (Treponema pallidum antibody, IgG Serum or Anti-Treponema) is NEGATIVE.    No treatment or change in treatment per [facility] ED Lab Result Syphilis protocol.

## 2024-02-22 ENCOUNTER — HOSPITAL ENCOUNTER (EMERGENCY)
Facility: CLINIC | Age: 39
Discharge: HOME | End: 2024-02-22
Attending: EMERGENCY MEDICINE | Admitting: EMERGENCY MEDICINE
Payer: COMMERCIAL

## 2024-02-22 VITALS
HEART RATE: 60 BPM | OXYGEN SATURATION: 97 % | DIASTOLIC BLOOD PRESSURE: 106 MMHG | SYSTOLIC BLOOD PRESSURE: 160 MMHG | TEMPERATURE: 98 F | RESPIRATION RATE: 18 BRPM

## 2024-02-22 DIAGNOSIS — N34.2 URETHRITIS: ICD-10-CM

## 2024-02-22 LAB
C TRACH DNA SPEC QL PROBE+SIG AMP: NEGATIVE
N GONORRHOEA DNA SPEC QL NAA+PROBE: NEGATIVE

## 2024-02-22 PROCEDURE — 250N000013 HC RX MED GY IP 250 OP 250 PS 637: Performed by: EMERGENCY MEDICINE

## 2024-02-22 PROCEDURE — 250N000009 HC RX 250: Performed by: EMERGENCY MEDICINE

## 2024-02-22 PROCEDURE — 96372 THER/PROPH/DIAG INJ SC/IM: CPT | Performed by: EMERGENCY MEDICINE

## 2024-02-22 PROCEDURE — 250N000011 HC RX IP 250 OP 636: Performed by: EMERGENCY MEDICINE

## 2024-02-22 PROCEDURE — 87491 CHLMYD TRACH DNA AMP PROBE: CPT | Performed by: EMERGENCY MEDICINE

## 2024-02-22 PROCEDURE — 99284 EMERGENCY DEPT VISIT MOD MDM: CPT

## 2024-02-22 RX ORDER — DOXYCYCLINE 100 MG/1
100 CAPSULE ORAL ONCE
Status: COMPLETED | OUTPATIENT
Start: 2024-02-22 | End: 2024-02-22

## 2024-02-22 RX ORDER — DOXYCYCLINE 100 MG/1
100 CAPSULE ORAL 2 TIMES DAILY
Qty: 20 CAPSULE | Refills: 0 | Status: SHIPPED | OUTPATIENT
Start: 2024-02-22 | End: 2024-03-03

## 2024-02-22 RX ADMIN — LIDOCAINE HYDROCHLORIDE 500 MG: 10 INJECTION, SOLUTION EPIDURAL; INFILTRATION; INTRACAUDAL; PERINEURAL at 09:09

## 2024-02-22 RX ADMIN — DOXYCYCLINE HYCLATE 100 MG: 100 CAPSULE ORAL at 09:12

## 2024-02-22 NOTE — ED PROVIDER NOTES
History     Chief Complaint:  Penile Discharge       HPI   Bryon Alonzo is a 38 year old male presents with concerns for STDs.  He notes that he has been having sex with his ex-girlfriend and noted some yellowish discharge this morning.  He has no urinary symptoms.  He has no testicular pain.  He notes he had something similar to this in the past was negative for STDs which surprised him.      Independent Historian:    Patient    Review of External Notes:  Chart review and prior STD testing    Medications:    doxycycline hyclate (VIBRAMYCIN) 100 MG capsule  albuterol (ALBUTEROL) 108 (90 BASE) MCG/ACT Inhaler  albuterol (PROAIR HFA/PROVENTIL HFA/VENTOLIN HFA) 108 (90 Base) MCG/ACT inhaler  amLODIPine (NORVASC) 10 MG tablet  aspirin 81 MG chewable tablet  atorvastatin (LIPITOR) 10 MG tablet  cholecalciferol 2000 UNITS tablet  EPINEPHrine 0.3 MG/0.3ML injection  fluticasone (FLOVENT HFA) 110 MCG/ACT inhaler  labetalol (NORMODYNE) 200 MG tablet  lisinopril (PRINIVIL/ZESTRIL) 40 MG tablet  melatonin 3 MG tablet  multivitamin, therapeutic (THERA-VIT) TABS tablet  predniSONE (DELTASONE) 20 MG tablet  ranitidine (ZANTAC) 150 MG tablet  risperiDONE (RISPERDAL) 1 MG tablet  traZODone (DESYREL) 50 MG tablet        Past Medical History:    Past Medical History:   Diagnosis Date    ACS (acute coronary syndrome) (H)     Amphetamine and psychostimulant intoxication (H)     Asthma     Bipolar disorder (H)     Chronic kidney disease     Cocaine abuse (H)     Hyperlipidemia     Hypertension     LVH (left ventricular hypertrophy) 2015    NSTEMI (non-ST elevated myocardial infarction) (H)     Polysubstance abuse (H)     Suicidal behavior     Suicidal ideation        Past Surgical History:    Past Surgical History:   Procedure Laterality Date    NO HISTORY OF SURGERY            Physical Exam   Patient Vitals for the past 24 hrs:   BP Temp Pulse Resp SpO2   02/22/24 0935 (!) 160/106 -- 60 -- --   02/22/24 0656 125/70 98  F (36.7  " C) 53 18 97 %        Physical Exam  GENERAL: well developed, pleasant  HEAD: atraumatic  EYES: pupils reactive, extraocular muscles intact, conjunctivae normal  ENT:  mucus membranes moist  NECK:  trachea midline, normal range of motion  RESPIRATORY: No tachypnea  CVS: Well-perfused skin  ABDOMEN: soft, nontender, nondistention  : No lesions on the penis no lymphadenopathy no rodney pus or drainage from the penis  MUSCULOSKELETAL: no deformities  SKIN: warm and dry, no acute rashes or ulceration  NEURO: GCS 15, cranial nerves intact, alert and oriented x3  PSYCH:  Mood/affect normal      Emergency Department Course     Imaging:  No orders to display     Report per     Laboratory:  Labs Ordered and Resulted from Time of ED Arrival to Time of ED Departure - No data to display     Procedures       Emergency Department Course & Assessments:             Interventions:  Medications   cefTRIAXone (ROCEPHIN) 500 mg in lidocaine injection (500 mg Intramuscular $Given 2/22/24 0909)   doxycycline hyclate (VIBRAMYCIN) capsule 100 mg (100 mg Oral $Given 2/22/24 0912)        Assessments:      Independent Interpretation (X-rays, CTs, rhythm strip):  None    Consultations/Discussion of Management or Tests:  None       Social Determinants of Health affecting care:  N/A     Disposition:  The patient was discharged to home.     Impression & Plan    CMS Diagnoses: None          Medical Decision Making:    Patient presents with penile discharge and recently having intercourse with his ex-girlfriend and is concerned about STDs.  He notes he was here recently for similar symptoms and the STD testing was negative.  He has a benign genital exam.  Requested a \"dirty urine\".  Patient was given antibiotics.  Patient should refrain from unprotected intercourse and follow-up.  Partner should be treated as well.    Critical Care time:  was 0 minutes for this patient excluding procedures.    Diagnosis:    ICD-10-CM    1. Urethritis  N34.2        "     Discharge Medications:  Discharge Medication List as of 2/22/2024  9:28 AM        START taking these medications    Details   doxycycline hyclate (VIBRAMYCIN) 100 MG capsule Take 1 capsule (100 mg) by mouth 2 times daily for 10 days, Disp-20 capsule, R-0, E-Prescribe                Aydin Milan MD  2/22/2024   Aydin Milan MD Adams, Shaun L, MD  02/22/24 1677

## 2024-02-22 NOTE — ED TRIAGE NOTES
Pt presents with concerns of STI. Noted discharge after encounter with former partner.      Triage Assessment (Adult)       Row Name 02/22/24 0656          Triage Assessment    Airway WDL WDL        Cardiac WDL    Cardiac WDL WDL     Cardiac Rhythm NSR        Cognitive/Neuro/Behavioral WDL    Cognitive/Neuro/Behavioral WDL WDL

## 2025-01-25 ENCOUNTER — HEALTH MAINTENANCE LETTER (OUTPATIENT)
Age: 40
End: 2025-01-25